# Patient Record
Sex: MALE | Race: WHITE | Employment: FULL TIME | URBAN - METROPOLITAN AREA
[De-identification: names, ages, dates, MRNs, and addresses within clinical notes are randomized per-mention and may not be internally consistent; named-entity substitution may affect disease eponyms.]

---

## 2017-07-12 ENCOUNTER — HOSPITAL ENCOUNTER (OUTPATIENT)
Facility: CLINIC | Age: 57
Discharge: HOME | End: 2017-07-12
Payer: COMMERCIAL

## 2017-07-12 ENCOUNTER — HOSPITAL ENCOUNTER (OUTPATIENT)
Dept: GENERAL RADIOLOGY | Facility: CLINIC | Age: 57
Discharge: HOME | End: 2017-07-12
Payer: COMMERCIAL

## 2017-07-12 DIAGNOSIS — R06.02 SHORTNESS OF BREATH: ICD-10-CM

## 2017-07-12 PROCEDURE — 85025 COMPLETE CBC W/AUTO DIFF WBC: CPT

## 2017-07-12 PROCEDURE — 83880 ASSAY OF NATRIURETIC PEPTIDE: CPT

## 2017-07-12 PROCEDURE — 80053 COMPREHEN METABOLIC PANEL: CPT

## 2017-07-12 PROCEDURE — 36415 COLL VENOUS BLD VENIPUNCTURE: CPT

## 2017-07-12 PROCEDURE — 71020 XR CHEST STANDARD TWO VW: CPT

## 2017-07-12 PROCEDURE — 84443 ASSAY THYROID STIM HORMONE: CPT

## 2017-07-13 LAB
ABSOLUTE EOS #: 0.2 K/UL (ref 0–0.4)
ABSOLUTE LYMPH #: 1.4 K/UL (ref 1–4.8)
ABSOLUTE MONO #: 0.5 K/UL (ref 0.1–1.2)
ALBUMIN SERPL-MCNC: 4.3 G/DL (ref 3.5–5.2)
ALBUMIN/GLOBULIN RATIO: 1.8 (ref 1–2.5)
ALP BLD-CCNC: 63 U/L (ref 40–129)
ALT SERPL-CCNC: 36 U/L (ref 5–41)
ANION GAP SERPL CALCULATED.3IONS-SCNC: 8 MMOL/L (ref 9–17)
AST SERPL-CCNC: 21 U/L
BASOPHILS # BLD: 1 %
BASOPHILS ABSOLUTE: 0 K/UL (ref 0–0.2)
BILIRUB SERPL-MCNC: 1.17 MG/DL (ref 0.3–1.2)
BNP INTERPRETATION: ABNORMAL
BUN BLDV-MCNC: 29 MG/DL (ref 6–20)
BUN/CREAT BLD: ABNORMAL (ref 9–20)
CALCIUM SERPL-MCNC: 9.4 MG/DL (ref 8.6–10.4)
CHLORIDE BLD-SCNC: 104 MMOL/L (ref 98–107)
CO2: 31 MMOL/L (ref 20–31)
CREAT SERPL-MCNC: 0.84 MG/DL (ref 0.7–1.2)
DIFFERENTIAL TYPE: NORMAL
EOSINOPHILS RELATIVE PERCENT: 3 %
GFR AFRICAN AMERICAN: >60 ML/MIN
GFR NON-AFRICAN AMERICAN: >60 ML/MIN
GFR SERPL CREATININE-BSD FRML MDRD: ABNORMAL ML/MIN/{1.73_M2}
GFR SERPL CREATININE-BSD FRML MDRD: ABNORMAL ML/MIN/{1.73_M2}
GLUCOSE BLD-MCNC: 101 MG/DL (ref 70–99)
HCT VFR BLD CALC: 44 % (ref 41–53)
HEMOGLOBIN: 14.7 G/DL (ref 13.5–17.5)
LYMPHOCYTES # BLD: 22 %
MCH RBC QN AUTO: 29 PG (ref 26–34)
MCHC RBC AUTO-ENTMCNC: 33.4 G/DL (ref 31–37)
MCV RBC AUTO: 86.7 FL (ref 80–100)
MONOCYTES # BLD: 7 %
PDW BLD-RTO: 14.8 % (ref 12.5–15.4)
PLATELET # BLD: 159 K/UL (ref 140–450)
PLATELET ESTIMATE: NORMAL
PMV BLD AUTO: 10.6 FL (ref 6–12)
POTASSIUM SERPL-SCNC: 4.1 MMOL/L (ref 3.7–5.3)
PRO-BNP: 503 PG/ML
RBC # BLD: 5.08 M/UL (ref 4.5–5.9)
RBC # BLD: NORMAL 10*6/UL
SEG NEUTROPHILS: 67 %
SEGMENTED NEUTROPHILS ABSOLUTE COUNT: 4.2 K/UL (ref 1.8–7.7)
SODIUM BLD-SCNC: 143 MMOL/L (ref 135–144)
TOTAL PROTEIN: 6.7 G/DL (ref 6.4–8.3)
TSH SERPL DL<=0.05 MIU/L-ACNC: 1.78 MIU/L (ref 0.3–5)
WBC # BLD: 6.4 K/UL (ref 3.5–11)
WBC # BLD: NORMAL 10*3/UL

## 2017-10-17 ENCOUNTER — APPOINTMENT (OUTPATIENT)
Dept: CT IMAGING | Age: 57
DRG: 216 | End: 2017-10-17
Payer: COMMERCIAL

## 2017-10-17 ENCOUNTER — APPOINTMENT (OUTPATIENT)
Dept: GENERAL RADIOLOGY | Age: 57
DRG: 216 | End: 2017-10-17
Payer: COMMERCIAL

## 2017-10-17 ENCOUNTER — HOSPITAL ENCOUNTER (INPATIENT)
Age: 57
LOS: 13 days | Discharge: HOME HEALTH | DRG: 216 | End: 2017-10-30
Attending: EMERGENCY MEDICINE | Admitting: FAMILY MEDICINE
Payer: COMMERCIAL

## 2017-10-17 DIAGNOSIS — I50.9 ACUTE CONGESTIVE HEART FAILURE, UNSPECIFIED CONGESTIVE HEART FAILURE TYPE: Primary | ICD-10-CM

## 2017-10-17 PROBLEM — I50.31 ACUTE DIASTOLIC CHF (CONGESTIVE HEART FAILURE) (HCC): Status: ACTIVE | Noted: 2017-10-17

## 2017-10-17 PROBLEM — I34.0 MODERATE MITRAL REGURGITATION: Status: ACTIVE | Noted: 2017-10-17

## 2017-10-17 PROBLEM — I34.0 MODERATE MITRAL REGURGITATION: Chronic | Status: ACTIVE | Noted: 2017-10-17

## 2017-10-17 PROBLEM — I48.0 PAROXYSMAL ATRIAL FIBRILLATION (HCC): Chronic | Status: ACTIVE | Noted: 2017-10-17

## 2017-10-17 PROBLEM — I48.0 PAROXYSMAL ATRIAL FIBRILLATION (HCC): Status: ACTIVE | Noted: 2017-10-17

## 2017-10-17 PROBLEM — R94.31 PROLONGED QT INTERVAL: Status: ACTIVE | Noted: 2017-10-17

## 2017-10-17 LAB
ABSOLUTE EOS #: 0.1 K/UL (ref 0–0.4)
ABSOLUTE LYMPH #: 1.3 K/UL (ref 1–4.8)
ABSOLUTE MONO #: 0.9 K/UL (ref 0.1–1.2)
ANION GAP SERPL CALCULATED.3IONS-SCNC: 11 MMOL/L (ref 9–17)
BASOPHILS # BLD: 0 %
BASOPHILS ABSOLUTE: 0 K/UL (ref 0–0.2)
BNP INTERPRETATION: ABNORMAL
BUN BLDV-MCNC: 35 MG/DL (ref 6–20)
BUN/CREAT BLD: ABNORMAL (ref 9–20)
CALCIUM SERPL-MCNC: 9.1 MG/DL (ref 8.6–10.4)
CHLORIDE BLD-SCNC: 103 MMOL/L (ref 98–107)
CHOLESTEROL/HDL RATIO: 5.2
CHOLESTEROL: 193 MG/DL
CO2: 26 MMOL/L (ref 20–31)
CREAT SERPL-MCNC: 1.13 MG/DL (ref 0.7–1.2)
D-DIMER QUANTITATIVE: 0.68 MG/L FEU
DIFFERENTIAL TYPE: ABNORMAL
EKG ATRIAL RATE: 130 BPM
EKG P-R INTERVAL: 152 MS
EKG Q-T INTERVAL: 390 MS
EKG QRS DURATION: 144 MS
EKG QTC CALCULATION (BAZETT): 573 MS
EKG R AXIS: 96 DEGREES
EKG T AXIS: 88 DEGREES
EKG VENTRICULAR RATE: 130 BPM
EOSINOPHILS RELATIVE PERCENT: 1 %
GFR AFRICAN AMERICAN: >60 ML/MIN
GFR NON-AFRICAN AMERICAN: >60 ML/MIN
GFR SERPL CREATININE-BSD FRML MDRD: ABNORMAL ML/MIN/{1.73_M2}
GFR SERPL CREATININE-BSD FRML MDRD: ABNORMAL ML/MIN/{1.73_M2}
GLUCOSE BLD-MCNC: 122 MG/DL (ref 70–99)
HCT VFR BLD CALC: 44 % (ref 41–53)
HCT VFR BLD CALC: 49.2 % (ref 41–53)
HDLC SERPL-MCNC: 37 MG/DL
HEMOGLOBIN: 14.9 G/DL (ref 13.5–17.5)
HEMOGLOBIN: 16.3 G/DL (ref 13.5–17.5)
LDL CHOLESTEROL: 130 MG/DL (ref 0–130)
LYMPHOCYTES # BLD: 10 %
MCH RBC QN AUTO: 28.1 PG (ref 26–34)
MCH RBC QN AUTO: 28.2 PG (ref 26–34)
MCHC RBC AUTO-ENTMCNC: 33.1 G/DL (ref 31–37)
MCHC RBC AUTO-ENTMCNC: 33.8 G/DL (ref 31–37)
MCV RBC AUTO: 83.5 FL (ref 80–100)
MCV RBC AUTO: 84.8 FL (ref 80–100)
MONOCYTES # BLD: 7 %
PARTIAL THROMBOPLASTIN TIME: 24.5 SEC (ref 21.3–31.3)
PDW BLD-RTO: 14.2 % (ref 12.5–15.4)
PDW BLD-RTO: 14.3 % (ref 12.5–15.4)
PLATELET # BLD: 154 K/UL (ref 140–450)
PLATELET # BLD: 179 K/UL (ref 140–450)
PLATELET ESTIMATE: ABNORMAL
PMV BLD AUTO: 10.4 FL (ref 6–12)
PMV BLD AUTO: 11.1 FL (ref 6–12)
POC TROPONIN I: 0 NG/ML (ref 0–0.1)
POC TROPONIN I: 0 NG/ML (ref 0–0.1)
POC TROPONIN INTERP: NORMAL
POC TROPONIN INTERP: NORMAL
POTASSIUM SERPL-SCNC: 4.1 MMOL/L (ref 3.7–5.3)
PRO-BNP: 5286 PG/ML
RBC # BLD: 5.27 M/UL (ref 4.5–5.9)
RBC # BLD: 5.8 M/UL (ref 4.5–5.9)
RBC # BLD: ABNORMAL 10*6/UL
SEG NEUTROPHILS: 82 %
SEGMENTED NEUTROPHILS ABSOLUTE COUNT: 10.5 K/UL (ref 1.8–7.7)
SODIUM BLD-SCNC: 140 MMOL/L (ref 135–144)
TRIGL SERPL-MCNC: 131 MG/DL
TSH SERPL DL<=0.05 MIU/L-ACNC: 2 MIU/L (ref 0.3–5)
VLDLC SERPL CALC-MCNC: ABNORMAL MG/DL (ref 1–30)
WBC # BLD: 10.1 K/UL (ref 3.5–11)
WBC # BLD: 12.9 K/UL (ref 3.5–11)
WBC # BLD: ABNORMAL 10*3/UL

## 2017-10-17 PROCEDURE — 80048 BASIC METABOLIC PNL TOTAL CA: CPT

## 2017-10-17 PROCEDURE — 6360000002 HC RX W HCPCS: Performed by: STUDENT IN AN ORGANIZED HEALTH CARE EDUCATION/TRAINING PROGRAM

## 2017-10-17 PROCEDURE — 6370000000 HC RX 637 (ALT 250 FOR IP): Performed by: INTERNAL MEDICINE

## 2017-10-17 PROCEDURE — 84443 ASSAY THYROID STIM HORMONE: CPT

## 2017-10-17 PROCEDURE — 2500000003 HC RX 250 WO HCPCS: Performed by: STUDENT IN AN ORGANIZED HEALTH CARE EDUCATION/TRAINING PROGRAM

## 2017-10-17 PROCEDURE — 93005 ELECTROCARDIOGRAM TRACING: CPT

## 2017-10-17 PROCEDURE — 85025 COMPLETE CBC W/AUTO DIFF WBC: CPT

## 2017-10-17 PROCEDURE — 99285 EMERGENCY DEPT VISIT HI MDM: CPT

## 2017-10-17 PROCEDURE — 2060000000 HC ICU INTERMEDIATE R&B

## 2017-10-17 PROCEDURE — 85379 FIBRIN DEGRADATION QUANT: CPT

## 2017-10-17 PROCEDURE — 6360000002 HC RX W HCPCS: Performed by: INTERNAL MEDICINE

## 2017-10-17 PROCEDURE — 71020 XR CHEST STANDARD TWO VW: CPT

## 2017-10-17 PROCEDURE — 36415 COLL VENOUS BLD VENIPUNCTURE: CPT

## 2017-10-17 PROCEDURE — 6360000004 HC RX CONTRAST MEDICATION: Performed by: STUDENT IN AN ORGANIZED HEALTH CARE EDUCATION/TRAINING PROGRAM

## 2017-10-17 PROCEDURE — 85730 THROMBOPLASTIN TIME PARTIAL: CPT

## 2017-10-17 PROCEDURE — 6370000000 HC RX 637 (ALT 250 FOR IP): Performed by: FAMILY MEDICINE

## 2017-10-17 PROCEDURE — 99223 1ST HOSP IP/OBS HIGH 75: CPT | Performed by: FAMILY MEDICINE

## 2017-10-17 PROCEDURE — 85027 COMPLETE CBC AUTOMATED: CPT

## 2017-10-17 PROCEDURE — 71260 CT THORAX DX C+: CPT

## 2017-10-17 PROCEDURE — 84484 ASSAY OF TROPONIN QUANT: CPT

## 2017-10-17 PROCEDURE — 80061 LIPID PANEL: CPT

## 2017-10-17 PROCEDURE — 83880 ASSAY OF NATRIURETIC PEPTIDE: CPT

## 2017-10-17 PROCEDURE — 6360000002 HC RX W HCPCS

## 2017-10-17 PROCEDURE — 96374 THER/PROPH/DIAG INJ IV PUSH: CPT

## 2017-10-17 PROCEDURE — 2580000003 HC RX 258: Performed by: FAMILY MEDICINE

## 2017-10-17 PROCEDURE — 6360000002 HC RX W HCPCS: Performed by: FAMILY MEDICINE

## 2017-10-17 RX ORDER — HEPARIN SODIUM 1000 [USP'U]/ML
4000 INJECTION, SOLUTION INTRAVENOUS; SUBCUTANEOUS ONCE
Status: COMPLETED | OUTPATIENT
Start: 2017-10-17 | End: 2017-10-17

## 2017-10-17 RX ORDER — SODIUM CHLORIDE 0.9 % (FLUSH) 0.9 %
10 SYRINGE (ML) INJECTION EVERY 12 HOURS SCHEDULED
Status: DISCONTINUED | OUTPATIENT
Start: 2017-10-17 | End: 2017-10-23

## 2017-10-17 RX ORDER — ATENOLOL 25 MG/1
25 TABLET ORAL 2 TIMES DAILY
Status: ON HOLD | COMMUNITY
End: 2017-10-30 | Stop reason: HOSPADM

## 2017-10-17 RX ORDER — POTASSIUM CHLORIDE 20 MEQ/1
40 TABLET, EXTENDED RELEASE ORAL PRN
Status: DISCONTINUED | OUTPATIENT
Start: 2017-10-17 | End: 2017-10-23

## 2017-10-17 RX ORDER — CITALOPRAM 10 MG/1
5 TABLET ORAL DAILY
Status: DISCONTINUED | OUTPATIENT
Start: 2017-10-17 | End: 2017-10-21

## 2017-10-17 RX ORDER — ASPIRIN 81 MG/1
81 TABLET, CHEWABLE ORAL DAILY
Status: DISCONTINUED | OUTPATIENT
Start: 2017-10-17 | End: 2017-10-23

## 2017-10-17 RX ORDER — SODIUM CHLORIDE 0.9 % (FLUSH) 0.9 %
10 SYRINGE (ML) INJECTION PRN
Status: DISCONTINUED | OUTPATIENT
Start: 2017-10-17 | End: 2017-10-23

## 2017-10-17 RX ORDER — HEPARIN SODIUM 1000 [USP'U]/ML
4000 INJECTION, SOLUTION INTRAVENOUS; SUBCUTANEOUS PRN
Status: DISCONTINUED | OUTPATIENT
Start: 2017-10-17 | End: 2017-10-23

## 2017-10-17 RX ORDER — NITROGLYCERIN 0.4 MG/1
0.4 TABLET SUBLINGUAL EVERY 5 MIN PRN
Status: DISCONTINUED | OUTPATIENT
Start: 2017-10-17 | End: 2017-10-23

## 2017-10-17 RX ORDER — ATENOLOL 25 MG/1
25 TABLET ORAL 2 TIMES DAILY
Status: DISCONTINUED | OUTPATIENT
Start: 2017-10-17 | End: 2017-10-17

## 2017-10-17 RX ORDER — HEPARIN SODIUM 10000 [USP'U]/100ML
12 INJECTION, SOLUTION INTRAVENOUS CONTINUOUS
Status: DISCONTINUED | OUTPATIENT
Start: 2017-10-17 | End: 2017-10-17

## 2017-10-17 RX ORDER — ONDANSETRON 2 MG/ML
4 INJECTION INTRAMUSCULAR; INTRAVENOUS EVERY 6 HOURS PRN
Status: DISCONTINUED | OUTPATIENT
Start: 2017-10-17 | End: 2017-10-23

## 2017-10-17 RX ORDER — LISINOPRIL 5 MG/1
5 TABLET ORAL DAILY
Status: DISCONTINUED | OUTPATIENT
Start: 2017-10-17 | End: 2017-10-17

## 2017-10-17 RX ORDER — SOTALOL HYDROCHLORIDE 80 MG/1
40 TABLET ORAL 2 TIMES DAILY
Status: DISCONTINUED | OUTPATIENT
Start: 2017-10-17 | End: 2017-10-17

## 2017-10-17 RX ORDER — METOPROLOL TARTRATE 50 MG/1
50 TABLET, FILM COATED ORAL 2 TIMES DAILY
Status: DISCONTINUED | OUTPATIENT
Start: 2017-10-17 | End: 2017-10-23

## 2017-10-17 RX ORDER — METOPROLOL TARTRATE 5 MG/5ML
INJECTION INTRAVENOUS
Status: DISPENSED
Start: 2017-10-17 | End: 2017-10-17

## 2017-10-17 RX ORDER — FUROSEMIDE 10 MG/ML
40 INJECTION INTRAMUSCULAR; INTRAVENOUS ONCE
Status: COMPLETED | OUTPATIENT
Start: 2017-10-17 | End: 2017-10-17

## 2017-10-17 RX ORDER — FUROSEMIDE 10 MG/ML
40 INJECTION INTRAMUSCULAR; INTRAVENOUS 2 TIMES DAILY
Status: DISCONTINUED | OUTPATIENT
Start: 2017-10-17 | End: 2017-10-18

## 2017-10-17 RX ORDER — HEPARIN SODIUM 1000 [USP'U]/ML
2000 INJECTION, SOLUTION INTRAVENOUS; SUBCUTANEOUS PRN
Status: DISCONTINUED | OUTPATIENT
Start: 2017-10-17 | End: 2017-10-23

## 2017-10-17 RX ORDER — HEPARIN SODIUM 1000 [USP'U]/ML
60 INJECTION, SOLUTION INTRAVENOUS; SUBCUTANEOUS ONCE
Status: DISCONTINUED | OUTPATIENT
Start: 2017-10-17 | End: 2017-10-17

## 2017-10-17 RX ORDER — POTASSIUM CHLORIDE 7.45 MG/ML
10 INJECTION INTRAVENOUS PRN
Status: DISCONTINUED | OUTPATIENT
Start: 2017-10-17 | End: 2017-10-23

## 2017-10-17 RX ORDER — HEPARIN SODIUM 1000 [USP'U]/ML
60 INJECTION, SOLUTION INTRAVENOUS; SUBCUTANEOUS PRN
Status: DISCONTINUED | OUTPATIENT
Start: 2017-10-17 | End: 2017-10-17

## 2017-10-17 RX ORDER — METOPROLOL TARTRATE 5 MG/5ML
5 INJECTION INTRAVENOUS ONCE
Status: COMPLETED | OUTPATIENT
Start: 2017-10-17 | End: 2017-10-17

## 2017-10-17 RX ORDER — ACETAMINOPHEN 325 MG/1
650 TABLET ORAL EVERY 4 HOURS PRN
Status: DISCONTINUED | OUTPATIENT
Start: 2017-10-17 | End: 2017-10-23

## 2017-10-17 RX ORDER — HEPARIN SODIUM 1000 [USP'U]/ML
30 INJECTION, SOLUTION INTRAVENOUS; SUBCUTANEOUS PRN
Status: DISCONTINUED | OUTPATIENT
Start: 2017-10-17 | End: 2017-10-17

## 2017-10-17 RX ORDER — HEPARIN SODIUM 10000 [USP'U]/100ML
1000 INJECTION, SOLUTION INTRAVENOUS CONTINUOUS
Status: DISCONTINUED | OUTPATIENT
Start: 2017-10-17 | End: 2017-10-23

## 2017-10-17 RX ORDER — POTASSIUM CHLORIDE 20MEQ/15ML
40 LIQUID (ML) ORAL PRN
Status: DISCONTINUED | OUTPATIENT
Start: 2017-10-17 | End: 2017-10-23

## 2017-10-17 RX ORDER — MAGNESIUM SULFATE 1 G/100ML
1 INJECTION INTRAVENOUS PRN
Status: DISCONTINUED | OUTPATIENT
Start: 2017-10-17 | End: 2017-10-23

## 2017-10-17 RX ADMIN — ASPIRIN 81 MG 81 MG: 81 TABLET ORAL at 14:52

## 2017-10-17 RX ADMIN — IOPAMIDOL 75 ML: 755 INJECTION, SOLUTION INTRAVENOUS at 06:43

## 2017-10-17 RX ADMIN — METOPROLOL TARTRATE 5 MG: 5 INJECTION INTRAVENOUS at 11:25

## 2017-10-17 RX ADMIN — METOPROLOL TARTRATE 50 MG: 50 TABLET, FILM COATED ORAL at 14:59

## 2017-10-17 RX ADMIN — HEPARIN SODIUM AND DEXTROSE 1000 UNITS/HR: 10000; 5 INJECTION INTRAVENOUS at 16:30

## 2017-10-17 RX ADMIN — HEPARIN SODIUM AND DEXTROSE 12 UNITS/KG/HR: 10000; 5 INJECTION INTRAVENOUS at 21:27

## 2017-10-17 RX ADMIN — METOPROLOL TARTRATE 50 MG: 50 TABLET, FILM COATED ORAL at 21:11

## 2017-10-17 RX ADMIN — FUROSEMIDE 40 MG: 10 INJECTION, SOLUTION INTRAMUSCULAR; INTRAVENOUS at 07:04

## 2017-10-17 RX ADMIN — CITALOPRAM 5 MG: 10 TABLET, FILM COATED ORAL at 14:53

## 2017-10-17 RX ADMIN — FUROSEMIDE 40 MG: 10 INJECTION, SOLUTION INTRAMUSCULAR; INTRAVENOUS at 21:12

## 2017-10-17 RX ADMIN — HEPARIN SODIUM 4000 UNITS: 1000 INJECTION, SOLUTION INTRAVENOUS; SUBCUTANEOUS at 16:30

## 2017-10-17 RX ADMIN — Medication 10 ML: at 21:12

## 2017-10-17 ASSESSMENT — ENCOUNTER SYMPTOMS
VOICE CHANGE: 0
WHEEZING: 0
COLOR CHANGE: 0
SINUS PRESSURE: 0
COUGH: 1
STRIDOR: 0
DIARRHEA: 0
COUGH: 0
ABDOMINAL PAIN: 0
CONSTIPATION: 0
BLOOD IN STOOL: 0
SORE THROAT: 0
NAUSEA: 0
VOMITING: 0
BACK PAIN: 0
SHORTNESS OF BREATH: 1

## 2017-10-17 NOTE — CARE COORDINATION
Case Management Initial Discharge Plan  Jose Choi,         Readmission Risk              Readmission Risk:        6.5       Age 71 or Greater:  0    Admitted from SNF or Requires Paid or Family Care:  0    Currently has CHF,COPD,ARF,CRI,or is on dialysis:  4    Takes more than 5 Prescription Medications:  0    Takes Digoxin,Insulin,Anticoagulants,Narcotics or ASA/Plavix:  [address] in Past 12 Months:  0    On Disability:  0    Patient Considers own Health:  2.5            Met with:patient to discuss discharge plans. Information verified: address, contacts, phone number, , insurance Yes  PCP: Maurisio Styles MD  Date of last visit: 2017    Insurance Provider: medical [place]    Discharge Planning  Current Residence:  Private Residence  Living Arrangements:  Spouse/Significant Other   Home has 2 stories/1 flight stairs to climb  Support Systems:  Spouse/Significant Other  Current Services PTA:  None Supplier: na  Patient able to perform ADL's:Independent  DME used to aid ambulation prior to admission: none/during admission none    Potential Assistance Needed:  N/A    Pharmacy: [facility] on [address]   Potential Assistance Purchasing Medications:  No  Does patient want to participate in local refill/ meds to beds program?  No    Patient agreeable to home care: No  [place] of choice provided:  n/a      Type of Home Care Services:  None  Patient expects to be discharged to:  Home    Prior SNF/Rehab Placement and Facility: no  Agreeable to SNF/Rehab: No  [place] of choice provided: n/a   Evaluation: n/a    Expected Discharge date: Follow Up Appointment: Best Day/ Time:      Transportation provider: wife  Transportation arrangements needed for discharge: No    Discharge Plan: Pt states he is independent at home and denies the need for in home skilled services at this time.  Plan is home with family support        Electronically signed by Ajay Garibay RN on 10/17/17 at 7:55 AM

## 2017-10-17 NOTE — PLAN OF CARE
Problem: Safety:  Goal: Free from accidental physical injury  Free from accidental physical injury  Outcome: Ongoing

## 2017-10-17 NOTE — ED PROVIDER NOTES
[facility]  ED  Emergency Department Encounter  Emergency Medicine Resident     Pt Name: Cydney Cranker  MRN: [id number]  Alon[place]ard 1960  Date of evaluation: 10/17/17  PCP:  Shauna Christina MD    CHIEF COMPLAINT       Chief Complaint   Patient presents with    Shortness of Breath     worsening since wednesday, unlabored breathing, NAD, cardiac hx       HISTORY OF PRESENT ILLNESS  (Location/Symptom, Timing/Onset, Context/Setting, Quality, Duration, Modifying Factors, Severity.)      Cydney Cranker is a 61 y.o. male who presents with Progressively worsening shortness of breath over the past week. Patient reports he has been self adjusting his medications due to feeling his heart was beating fast.  Shortness breath is worsened with exertion, patient reports walking a few steps will worsen it. Patient has a history of mitral valve replacement approximately 10 years ago. Recent stress and echo in July 2017 was normal per patient. Patient reports he takes atenolol and sotalol for A. fib, and is on aspirin. Patient reports he does not take any other anticoagulants. Patient denies fevers, chills, chest pain. Does report orthopnea since this began Wednesday. No sick contacts. Reports Dr. Henrry Wolf is his cardiologist and he last had a visit approximately one month ago. PAST MEDICAL / SURGICAL / SOCIAL / FAMILY HISTORY      has no past medical history on file. has a past surgical history that includes Mitral valve replacement (2007). Social History     Social History    Marital status:      Spouse name: N/A    Number of children: N/A    Years of education: N/A     Occupational History    Not on file.      Social History Main Topics    Smoking status: Never Smoker    Smokeless tobacco: Never Used    Alcohol use No    Drug use: No    Sexual activity: Not on file     Other Topics Concern    Not on file     Social History Narrative    No narrative on file       History reviewed. No pertinent family history. Allergies:  Review of patient's allergies indicates no known allergies. Home Medications:  Prior to Admission medications    Medication Sig Start Date End Date Taking? Authorizing Provider   atenolol (TENORMIN) 25 MG tablet Take 25 mg by mouth 2 times daily   Yes Historical Provider, MD   sotalol (BETAPACE) 40 MG split tablet Take 40 mg by mouth 2 times daily   Yes Historical Provider, MD   aspirin 81 MG tablet Take 81 mg by mouth daily   Yes Historical Provider, MD   Citalopram Hydrobromide (CELEXA PO) Take 5 mg by mouth   Yes Historical Provider, MD       REVIEW OF SYSTEMS    (2-9 systems for level 4, 10 or more for level 5)      Review of Systems   Constitutional: Negative for fatigue and fever. Respiratory: Positive for cough and shortness of breath. Negative for wheezing and stridor. Cardiovascular: Positive for palpitations and leg swelling. Negative for chest pain. Gastrointestinal: Negative for diarrhea, nausea and vomiting. Genitourinary: Negative for decreased urine volume and urgency. Musculoskeletal: Negative for back pain and neck pain. Skin: Negative for color change and pallor. Neurological: Negative for weakness, light-headedness and headaches. Hematological: Does not bruise/bleed easily. PHYSICAL EXAM   (up to 7 for level 4, 8 or more for level 5)      INITIAL VITALS:   BP (!) 139/104   Pulse 118   Temp 97.2 °F (36.2 °C)   Resp 15   Ht 5' 9\" (1.753 m)   Wt 110 lb (49.9 kg)   SpO2 96%   BMI 16.24 kg/m²     Physical Exam   Constitutional: He is oriented to person, place, and time. He appears well-nourished. No distress. HENT:   Head: Normocephalic. Cardiovascular: Exam reveals no gallop. Tachycardic   Pulmonary/Chest: No respiratory distress. He has rales. He exhibits no tenderness. Regular respiratory rate, fair air movement, bibasilar rales   Abdominal: Soft. He exhibits no distension. There is no tenderness. Natriuretic Peptide   Result Value Ref Range    Pro-BNP 5,286 (H) <300 pg/mL    BNP Interpretation         CBC Auto Differential   Result Value Ref Range    WBC 12.9 (H) 3.5 - 11.0 k/uL    RBC 5.80 4.5 - 5.9 m/uL    Hemoglobin 16.3 13.5 - 17.5 g/dL    Hematocrit 49.2 41 - 53 %    MCV 84.8 80 - 100 fL    MCH 28.1 26 - 34 pg    MCHC 33.1 31 - 37 g/dL    RDW 14.2 12.5 - 15.4 %    Platelets 628 552 - 997 k/uL    MPV 11.1 6.0 - 12.0 fL    Differential Type NOT REPORTED     Seg Neutrophils 82 %    Lymphocytes 10 %    Monocytes 7 %    Eosinophils % 1 %    Basophils 0 %    Segs Absolute 10.50 (H) 1.8 - 7.7 k/uL    Absolute Lymph # 1.30 1.0 - 4.8 k/uL    Absolute Mono # 0.90 0.1 - 1.2 k/uL    Absolute Eos # 0.10 0.0 - 0.4 k/uL    Basophils # 0.00 0.0 - 0.2 k/uL    WBC Morphology NOT REPORTED     RBC Morphology NOT REPORTED     Platelet Estimate NOT REPORTED    D-Dimer, Quantitative   Result Value Ref Range    D-Dimer, Quant 0.68 mg/L FEU   POCT troponin   Result Value Ref Range    POC Troponin I 0.00 0.00 - 0.10 ng/mL    POC Troponin Interp       The Troponin-I (POC) results cannot be compared to the Troponin-T results. POCT troponin   Result Value Ref Range    POC Troponin I 0.00 0.00 - 0.10 ng/mL    POC Troponin Interp       The Troponin-I (POC) results cannot be compared to the Troponin-T results. RADIOLOGY:  No results found. EKG    Normal sinus rhythm, tachycardic (116), regular intervals, no ST elevation/depression, no t wave changes    Impression: non-specific EKG    All EKG's are interpreted by the Emergency Department Physician who either signs or Co-signs this chart in the absence of a cardiologist.    [facility]/EMERGENCY DEPARTMENT COURSE:  1126 AM: 54-year-old male with history of mitral valve replacement and A. fib presenting with shortness of breath ×5 days. Patient is nontoxic-appearing, tachycardic and has bibasilar rales on exam.  Cardiac workup, including d-dimer and BNP. Reassess.     5:40: D-dimer

## 2017-10-17 NOTE — H&P
Cholesterol 193 <200 mg/dL    HDL 37 (L) >40 mg/dL    LDL Cholesterol 130 0 - 130 mg/dL    Chol/HDL Ratio 5.2 (H) <5    Triglycerides 131 <150 mg/dL    VLDL NOT REPORTED 1 - 30 mg/dL   TSH without Reflex    Collection Time: 10/17/17  5:25 AM   Result Value Ref Range    TSH 2.00 0.30 - 5.00 mIU/L   POCT troponin    Collection Time: 10/17/17  7:05 AM   Result Value Ref Range    POC Troponin I 0.00 0.00 - 0.10 ng/mL    POC Troponin Interp       The Troponin-I (POC) results cannot be compared to the Troponin-T results. EKG 12 Lead    Collection Time: 10/17/17 11:12 AM   Result Value Ref Range    Ventricular Rate 130 BPM    Atrial Rate 130 BPM    P-R Interval 152 ms    QRS Duration 144 ms    Q-T Interval 390 ms    QTc Calculation (Bazett) 573 ms    R Axis 96 degrees    T Axis 88 degrees       Imaging/Diagonstics:    CT chest pulmonary embolism 10/17/17 - no central or proximal segmental pulmonary embolus, mild to moderate CHF with small pleural effusion and pulmonary edema, 5 mm  lower lobe nodule. EKG 10/17/17- sinus tachycardia  Assessment :      Primary Problem  Acute diastolic CHF (congestive heart failure) [facility])    Active Hospital Problems    Diagnosis Date Noted    Acute diastolic CHF (congestive heart failure) (HCC) [I50.31] 10/17/2017    Moderate mitral regurgitation [I34.0] 10/17/2017    Paroxysmal atrial fibrillation ([address] [I48.0] 10/17/2017    Prolonged QT interval [R94.31] 10/17/2017       Plan:     Patient status Admit as inpatient in the  Progressive Unit/Step down    1. Acute diastolic CHF-IV Lasix, will increase atenolol. Cardiology consultation. 2. Sinus tachycardia worsening CHF- rate control. 3. Moderate mitral regurgitation-repeat echocardiogram  4. Proximal atrial fibrillation- continue aspirin, BB . Sotalol.    5. QTc prolongation -     Consultations:   IP CONSULT TO HOSPITALIST  IP CONSULT TO CARDIOLOGY    Patient is admitted as inpatient status because of co-morbidities listed above, severity of signs and symptoms as outlined, requirement for current medical therapies and most importantly because of direct risk to patient if care not provided in a hospital setting. Discussed with staff Jessi Juarez RN.   BiPAP at bedside Updated    Justo Kwan MD  10/17/2017    Copy sent to Dr. Helen Patton MD

## 2017-10-17 NOTE — ED NOTES
Report taken from [facility]. Pt is A&O x 4. NAD noted at this time. Awaiting POC. 2nd TROP running.       Anuj Street RN  10/17/17 2937

## 2017-10-18 ENCOUNTER — APPOINTMENT (OUTPATIENT)
Dept: CARDIAC CATH/INVASIVE PROCEDURES | Age: 57
DRG: 216 | End: 2017-10-18
Payer: COMMERCIAL

## 2017-10-18 LAB
ANION GAP SERPL CALCULATED.3IONS-SCNC: 13 MMOL/L (ref 9–17)
BUN BLDV-MCNC: 30 MG/DL (ref 6–20)
BUN/CREAT BLD: ABNORMAL (ref 9–20)
CALCIUM SERPL-MCNC: 9 MG/DL (ref 8.6–10.4)
CHLORIDE BLD-SCNC: 101 MMOL/L (ref 98–107)
CO2: 28 MMOL/L (ref 20–31)
CREAT SERPL-MCNC: 0.96 MG/DL (ref 0.7–1.2)
GFR AFRICAN AMERICAN: >60 ML/MIN
GFR NON-AFRICAN AMERICAN: >60 ML/MIN
GFR SERPL CREATININE-BSD FRML MDRD: ABNORMAL ML/MIN/{1.73_M2}
GFR SERPL CREATININE-BSD FRML MDRD: ABNORMAL ML/MIN/{1.73_M2}
GLUCOSE BLD-MCNC: 124 MG/DL (ref 70–99)
MAGNESIUM: 2.2 MG/DL (ref 1.6–2.6)
PARTIAL THROMBOPLASTIN TIME: 32 SEC (ref 21.3–31.3)
PARTIAL THROMBOPLASTIN TIME: 44.6 SEC (ref 21.3–31.3)
PARTIAL THROMBOPLASTIN TIME: 47.9 SEC (ref 21.3–31.3)
PARTIAL THROMBOPLASTIN TIME: 48.2 SEC (ref 21.3–31.3)
POTASSIUM SERPL-SCNC: 3.7 MMOL/L (ref 3.7–5.3)
POTASSIUM SERPL-SCNC: 4.3 MMOL/L (ref 3.7–5.3)
SODIUM BLD-SCNC: 142 MMOL/L (ref 135–144)

## 2017-10-18 PROCEDURE — 2500000003 HC RX 250 WO HCPCS: Performed by: INTERNAL MEDICINE

## 2017-10-18 PROCEDURE — 93005 ELECTROCARDIOGRAM TRACING: CPT

## 2017-10-18 PROCEDURE — 6370000000 HC RX 637 (ALT 250 FOR IP): Performed by: INTERNAL MEDICINE

## 2017-10-18 PROCEDURE — 2580000003 HC RX 258: Performed by: INTERNAL MEDICINE

## 2017-10-18 PROCEDURE — 36415 COLL VENOUS BLD VENIPUNCTURE: CPT

## 2017-10-18 PROCEDURE — 6370000000 HC RX 637 (ALT 250 FOR IP): Performed by: NURSE PRACTITIONER

## 2017-10-18 PROCEDURE — 2060000000 HC ICU INTERMEDIATE R&B

## 2017-10-18 PROCEDURE — 83735 ASSAY OF MAGNESIUM: CPT

## 2017-10-18 PROCEDURE — 2580000003 HC RX 258: Performed by: FAMILY MEDICINE

## 2017-10-18 PROCEDURE — 99232 SBSQ HOSP IP/OBS MODERATE 35: CPT | Performed by: FAMILY MEDICINE

## 2017-10-18 PROCEDURE — 85730 THROMBOPLASTIN TIME PARTIAL: CPT

## 2017-10-18 PROCEDURE — 6360000002 HC RX W HCPCS: Performed by: INTERNAL MEDICINE

## 2017-10-18 PROCEDURE — 80048 BASIC METABOLIC PNL TOTAL CA: CPT

## 2017-10-18 PROCEDURE — 93312 ECHO TRANSESOPHAGEAL: CPT

## 2017-10-18 PROCEDURE — 93325 DOPPLER ECHO COLOR FLOW MAPG: CPT

## 2017-10-18 PROCEDURE — 93320 DOPPLER ECHO COMPLETE: CPT

## 2017-10-18 PROCEDURE — 6370000000 HC RX 637 (ALT 250 FOR IP): Performed by: FAMILY MEDICINE

## 2017-10-18 PROCEDURE — B24BZZ4 ULTRASONOGRAPHY OF HEART WITH AORTA, TRANSESOPHAGEAL: ICD-10-PCS | Performed by: INTERNAL MEDICINE

## 2017-10-18 PROCEDURE — 84132 ASSAY OF SERUM POTASSIUM: CPT

## 2017-10-18 PROCEDURE — 99221 1ST HOSP IP/OBS SF/LOW 40: CPT | Performed by: PHYSICIAN ASSISTANT

## 2017-10-18 RX ORDER — FUROSEMIDE 40 MG/1
40 TABLET ORAL DAILY
Status: DISCONTINUED | OUTPATIENT
Start: 2017-10-18 | End: 2017-10-23

## 2017-10-18 RX ORDER — SODIUM CHLORIDE 9 MG/ML
INJECTION, SOLUTION INTRAVENOUS CONTINUOUS
Status: DISCONTINUED | OUTPATIENT
Start: 2017-10-18 | End: 2017-10-21

## 2017-10-18 RX ADMIN — HEPARIN SODIUM 2000 UNITS: 1000 INJECTION, SOLUTION INTRAVENOUS; SUBCUTANEOUS at 12:42

## 2017-10-18 RX ADMIN — METOPROLOL TARTRATE 12.5 MG: 25 TABLET ORAL at 03:04

## 2017-10-18 RX ADMIN — SODIUM CHLORIDE: 9 INJECTION, SOLUTION INTRAVENOUS at 09:10

## 2017-10-18 RX ADMIN — HEPARIN SODIUM 2000 UNITS: 1000 INJECTION, SOLUTION INTRAVENOUS; SUBCUTANEOUS at 18:11

## 2017-10-18 RX ADMIN — DILTIAZEM HYDROCHLORIDE 10 MG/HR: 5 INJECTION INTRAVENOUS at 05:19

## 2017-10-18 RX ADMIN — CITALOPRAM 5 MG: 10 TABLET, FILM COATED ORAL at 12:03

## 2017-10-18 RX ADMIN — FUROSEMIDE 40 MG: 40 TABLET ORAL at 12:44

## 2017-10-18 RX ADMIN — DILTIAZEM HYDROCHLORIDE 10 MG/HR: 5 INJECTION INTRAVENOUS at 17:15

## 2017-10-18 RX ADMIN — METOPROLOL TARTRATE 50 MG: 50 TABLET, FILM COATED ORAL at 12:03

## 2017-10-18 RX ADMIN — Medication 10 ML: at 20:04

## 2017-10-18 RX ADMIN — POTASSIUM CHLORIDE 40 MEQ: 40 SOLUTION ORAL at 16:40

## 2017-10-18 RX ADMIN — ASPIRIN 81 MG 81 MG: 81 TABLET ORAL at 12:03

## 2017-10-18 RX ADMIN — METOPROLOL TARTRATE 50 MG: 50 TABLET, FILM COATED ORAL at 20:03

## 2017-10-18 RX ADMIN — HEPARIN SODIUM AND DEXTROSE 13.74 UNITS/KG/HR: 10000; 5 INJECTION INTRAVENOUS at 16:38

## 2017-10-18 ASSESSMENT — ENCOUNTER SYMPTOMS
COUGH: 0
CONSTIPATION: 0
BLOOD IN STOOL: 0
WHEEZING: 0
SORE THROAT: 0
DIARRHEA: 0
SHORTNESS OF BREATH: 1
VOICE CHANGE: 0
SINUS PRESSURE: 0
NAUSEA: 0
ABDOMINAL PAIN: 0
VOMITING: 0

## 2017-10-18 ASSESSMENT — PAIN SCALES - GENERAL
PAINLEVEL_OUTOF10: 0

## 2017-10-18 NOTE — PROGRESS NOTES
Pt. HR remaining in 120s-130s, one time dose of PO lopressor given per on-call NP, will continue to monitor

## 2017-10-18 NOTE — PLAN OF CARE
Problem: FLUID AND ELECTROLYTE IMBALANCE  Goal: Fluid and electrolyte balance are achieved/maintained  Outcome: Ongoing

## 2017-10-18 NOTE — CONSULTS
Used        History   Alcohol Use No        History   Drug Use No      Marital status:      Occupation:      Family History:    History reviewed. No pertinent family history. REVIEW OF SYSTEMS:  11 PROS negative except for what's mentioned in HPI and PMH    PHYSICAL EXAM:  General: no acute distress, alert, oriented x 3, appropriate mood and affect, looks stated age, well nourished  VITALS:  /70   Pulse 76   Temp 97.7 °F (36.5 °C) (Oral)   Resp 16   Ht 5' 9\" (1.753 m)   Wt 200 lb 9.6 oz (91 kg)   SpO2 95%   BMI 29.62 kg/m²   Eyes:  Pupils equal round reactive to light and accomodation. Extraocular movement intact. Anicteric. Head/ENT:  Atraumatic, normocephalic. Hearing grossly intact. Good dentition no abscess or obvious dental caries  Neck: Supple, nontender. Trachea midline. No thyromegaly. No bruit  Lymphatics: cervical no lymphadenopathy  Lungs: fine crackles right base  Cardiovascular: Normal S1, S2,  Faint murmur LLSB, Aflutter  Abdomen:  Soft, non-tender, normal bowel sounds. No bruits, organomegaly or masses. Musculoskeletal:  5/5 muscle strength throughout. Extremities: none edema  PV:  peripheral pulses 2+ and symmetric  Skin: No jaundice or eczema. Neurological: Normal sensation throughout. Moves all extremities to command  Psychiatric: Oriented to person place and time, no evidence of depression.     Data:  CBC:   Lab Results   Component Value Date    WBC 10.1 10/17/2017    RBC 5.27 10/17/2017    RBC 5.67 04/11/2012    HGB 14.9 10/17/2017    HCT 44.0 10/17/2017    MCV 83.5 10/17/2017    MCH 28.2 10/17/2017    MCHC 33.8 10/17/2017    RDW 14.3 10/17/2017     10/17/2017     04/11/2012    MPV 10.4 10/17/2017     BMP:    Lab Results   Component Value Date     10/18/2017    K 3.7 10/18/2017     10/18/2017    CO2 28 10/18/2017    BUN 30 10/18/2017    LABALBU 4.3 07/12/2017    LABALBU 4.4 04/11/2012    CREATININE 0.96 10/18/2017    CALCIUM 9.0 10/18/2017    GFRAA >60 10/18/2017    LABGLOM >60 10/18/2017    GLUCOSE 124 10/18/2017    GLUCOSE 96 04/11/2012     Last 3 Troponin:    Lab Results   Component Value Date    TROPONINI 0.00 10/17/2017    TROPONINI 0.00 10/17/2017     Cardiac Cath:  Not done yet    ALEENA, 10/18/2017: Bioprosthetic mitral valve is seen. Thickened leaflets, severe mitral regurgitation with multiple jests seen. High velocity across the valve with mean gradient of 15 mm Hg suggestive of dysfunctional bioprosthetic mitral valve. Mild aortic and tricuspid regurgitation. Reduced left ventricular systolic function. No PFO seen by color Doppler. Problem List:  Patient Active Problem List   Diagnosis    Acute diastolic CHF (congestive heart failure) (HCC)    Moderate mitral regurgitation    Paroxysmal atrial fibrillation (HCC)    Prolonged QT interval     Plan:  Discussed at length with patient and wife. Will proceed with right and left heart cath per Cardiology. Obtain all old records from Dr. Nazia Brantley and Bonnie Bailey    This patient was seen with  Ethel Justice PA-C and agree with documentation as scribed. I have seen and examined the patient, agree with the assessment and management. Pt may need repeat Maze and will discuss with Dr. Dilia Mari. Care plan has been discussed with staff.   Hector Davey MD

## 2017-10-18 NOTE — PROGRESS NOTES
Patient admitted from 3006, consent signed, all questions answered. Pt ready for procedure. Call light to reach with side rails up 2 of 2. Wife at bedside with patient.

## 2017-10-18 NOTE — PROGRESS NOTES
[facility]   Progress Note                   Date:   10/18/2017  Patient name: Nathalie Sanchez  Date of admission:  10/17/2017  4:54 AM  MRN:   [id number]  YOB: 1960  PCP: Linnea Vidales MD    Reason for Admission: CHF (congestive heart failure), NYHA class II, acute on chronic, systolic (HCC) [T36.36]    Subjective:       Clinical Changes / Abnormalities: Patient seen and examined. Resting in bed. Denies angina, shortness of breath at this time. States he has some dizziness. Patient states his heart rate occasionally goes into an irregular rate when above 105 bpm.     -2.1 L since admission. Medications:   Scheduled Meds:   aspirin  81 mg Oral Daily    citalopram  5 mg Oral Daily    sodium chloride flush  10 mL Intravenous 2 times per day    furosemide  40 mg Intravenous BID    metoprolol tartrate  50 mg Oral BID     Continuous Infusions:   diltiazem (CARDIZEM) 125 mg in dextrose 5% 125 mL infusion 10 mg/hr (10/18/17 0519)    sodium chloride 75 mL/hr at 10/18/17 0910    heparin (porcine) 12 Units/kg/hr (10/17/17 2127)     CBC:   Recent Labs      10/17/17   0525  10/17/17   1518   WBC  12.9*  10.1   HGB  16.3  14.9   PLT  179  154     BMP:    Recent Labs      10/17/17   0525  10/18/17   0653   NA  140  142   K  4.1  3.7   CL  103  101   CO2  26  28   BUN  35*  30*   CREATININE  1.13  0.96   GLUCOSE  122*  124*     Hepatic: No results for input(s): AST, ALT, ALB, BILITOT, ALKPHOS in the last 72 hours. Troponin:   Recent Labs      10/17/17   0513  10/17/17   0705   TROPONINI  0.00  0.00     BNP: No results for input(s): BNP in the last 72 hours. Lipids:   Recent Labs      10/17/17   0525   CHOL  193   HDL  37*     INR: No results for input(s): INR in the last 72 hours. STRESS 7/27/17: Small inferior infarct. EF 50%. WMA.      TTE 7/27/17: EF 50%. Segmental WMA. Bioprosthetic MV has evidence of stenosis. ALEENA on 10/18/17: The patient was referred for ALEENA.  Benefits, risks and alternatives explained and consent signed. Bioprosthetic mitral valve is seen. Thickened leaflets, severe mitral regurgitation with multiple jests seen. High velocity across the valve with mean gradient of 15 mm Hg suggestive of dysfunctional bioprosthetic mitral valve. Mild aortic and tricuspid regurgitation. Reduced left ventricular systolic function. No PFO seen by color Doppler. Condition discussed with family and patient, consider CT surgery evaluation. Will need RHC and zohreh nary angiography before any anticipated valve redo surgery. No complications    Objective:   Vitals: /82   Pulse (!) 48   Temp 97.4 °F (36.3 °C) (Oral)   Resp 16   Ht 5' 9\" (1.753 m)   Wt 200 lb 9.6 oz (91 kg)   SpO2 95%   BMI 29.62 kg/m²   General appearance: alert and cooperative with exam  HEENT: Head: Normocephalic, no lesions, without obvious abnormality. Neck: no JVD, trachea midline, no adenopathy  Lungs: Clear to auscultation  Heart: Irregular rate and rhythm, s1/s2 auscultated, +3/6 murmur left axillary region. Abdomen: soft, non-tender, bowel sounds active  Extremities: no edema  Neurologic: not done        Assessment / Acute Cardiac Problems:   1. Shortness of breath  2. AFib s/p ablation  3. PAF-heparin gtt. Patient Active Problem List:     Acute diastolic CHF (congestive heart failure) (HCC)     Moderate mitral regurgitation     Paroxysmal atrial fibrillation (HCC)     Prolonged QT interval      Plan of Treatment:   1. Severe MR-CT surgery consult. Would need right and left heart cath prior to valve redo surgery. 2. PAF-on heparin and cardizem gtt. EKG now. 3. HFpEF-on Lasix.  Change to PO    Electronically signed by Remington Alfaro CNP on 10/18/2017 at 11:44 AM  [address]  [phone]

## 2017-10-18 NOTE — PROGRESS NOTES
Daily Progress Note     Admit Date: 10/17/2017  Bed/Room No.  3006/3006-01  Admitting Physician : Carmen Cosme MD  Code Status :Full Code  Hospital Day:  LOS: 1 day   Complaint at Admission :   Chief Complaint   Patient presents with    Shortness of Breath     worsening since wednesday, unlabored breathing, NAD, cardiac hx     Principal Problem:    Acute diastolic CHF (congestive heart failure) ([facility]  Active Problems: Moderate mitral regurgitation    Paroxysmal atrial fibrillation (HCC)    Prolonged QT interval    Subjective: Interval History/Significant events :  10/18/17    Patient Reports improvement in breathing, heart rate is controlled. Patient having bigemini. He denies any chest pain, pedal edema, palpitations. Vitals - Stable afebrile  Labs - stable ,     Nursing notes , Consults notes reviewed. Overnight events and updates discussed with Nursing staff . Background history    Admitted for Acute diastolic CHF (congestive heart failure) ([facility] , in hospital for 1 days . Rosamaria Chase 61 y.o. male   is admitted to the hospital for the management of  Acute diastolic CHF, moderate mitral regurgitation. Patient came to emergency room for shortness of breath. He reported that he has been having difficulty in breathing since May 2017. Patient followed with his cardiologist in July and had stress test and echocardiogram.  He was found to have moderate mitral regurgitation. Patient has underlying history of mitral valve replacement in February 2017. He has history of paroxysmal atrial fibrillation and has been on sotalol and atenolol. Patient takes aspirin 81 mg daily. He reported cough, with clear sputum, orthopnea, PND. Patient denied any palpitations although he reported that her heart rate has been more than 110. Patient denies any fever, dizziness, lightheadedness. He denies any leg swelling. Initial evaluation in the emergency room showed heart rate 130s. Troponins negative.   CT 0317 114/83 97.7 °F (36.5 °C) Oral 132 19 - - -   10/17/17 2304 133/73 98 °F (36.7 °C) Oral 87 22 - - -   10/17/17 1937 137/74 97.5 °F (36.4 °C) Oral 89 20 96 % - -   10/17/17 1300 - - - - - - 5' 9\" (1.753 m) 200 lb 9.6 oz (91 kg)   10/17/17 1130 136/89 97.9 °F (36.6 °C) Oral 126 21 95 % - -   10/17/17 0950 - - - 115 17 96 % - -   10/17/17 0902 (!) 142/97 98.1 °F (36.7 °C) Oral 114 24 97 % - -   10/17/17 0832 (!) 148/117 - - 120 22 - - -   10/17/17 0748 (!) 137/93 - - 115 24 95 % - -     Intake / output   10/17 0701 - 10/18 0700  In: 400 [P.O.:400]  Out: 2300 [Urine:2300]  Physical Exam:  Physical Exam   Constitutional: He is oriented to person, place, and time. He appears well-developed and well-nourished. No distress. HENT:   Mouth/Throat: Oropharynx is clear and moist. No oropharyngeal exudate. Eyes: Pupils are equal, round, and reactive to light. No scleral icterus. Neck: Neck supple. No JVD present. No tracheal deviation present. No thyromegaly present. Cardiovascular: Normal rate, regular rhythm and intact distal pulses. Exam reveals no gallop and no friction rub. Murmur heard. Pulmonary/Chest: Effort normal. No respiratory distress. He has no wheezes. He has rales. He exhibits no tenderness. Abdominal: Soft. Bowel sounds are normal. He exhibits no mass. There is no tenderness. There is no rebound and no guarding. Lymphadenopathy:     He has no cervical adenopathy. Neurological: He is alert and oriented to person, place, and time. No cranial nerve deficit. He exhibits normal muscle tone. Skin: Skin is warm. No rash noted. He is not diaphoretic. Psychiatric: He has a normal mood and affect. His behavior is normal.   Nursing note and vitals reviewed.     Lower Extremities : No ankle Edema , No calf Tenderness     Laboratory findings:    Recent Labs      10/17/17   0525  10/17/17   1518   WBC  12.9*  10.1   HGB  16.3  14.9   HCT  49.2  44.0   PLT  179  154     Recent Labs      10/17/17 0525   NA  140   K  4.1   CL  103   CO2  26   GLUCOSE  122*   BUN  35*   CREATININE  1.13   CALCIUM  9.1     Recent Labs      10/17/17   0513  10/17/17   0525  10/17/17   0705   TSH   --   2.00   --    CHOL   --   193   --    HDL   --   37*   --    LDLCHOLESTEROL   --   130   --    CHOLHDLRATIO   --   5.2*   --    TRIG   --   131   --    VLDL   --   NOT REPORTED   --    TROPONINI  0.00   --   0.00      Imaging/Diagonstics:     CT chest pulmonary embolism 10/17/17 - no central or proximal segmental pulmonary embolus, mild to moderate CHF with small pleural effusion and pulmonary edema, 5 mm  lower lobe nodule.      EKG 10/17/17- sinus tachycardia  Clinical Course : gradually improving  Assessment and Plan      1. Acute diastolic CHF- Continue  IV Lasix. Rate control with Cardizem infusion . 2. PAF - in NSR with Bigeminy . heparin infusion, Cardizem infusion. ? Sotalol . 3. Moderate mitral regurgitation- status post bioprosthetic valve replacement Feb 2007. ALEENA shows dysfunctional mitral valve. Needs a redo surgery. , low intensity heparin. 4. QTc prolongation - Likely secondary to sotalol. Will defer to cardiology. Check magnesium      Continue to monitor vitals , Intake / output ,  Cell count , HGB , Kidney function, oxygenation  as indicated . Plan and updates discussed with patient ,  answers  explained to satisfaction.    Plan discussed with Staff carmelina NUNEZ     (Please note that portions of this note were completed with a voice recognition program. Efforts were made to edit the dictations but occasionally words are mis-transcribed.)      Aissatou Casanova MD  10/18/2017

## 2017-10-19 ENCOUNTER — APPOINTMENT (OUTPATIENT)
Dept: CARDIAC CATH/INVASIVE PROCEDURES | Age: 57
DRG: 216 | End: 2017-10-19
Payer: COMMERCIAL

## 2017-10-19 LAB
ANION GAP SERPL CALCULATED.3IONS-SCNC: 12 MMOL/L (ref 9–17)
BUN BLDV-MCNC: 26 MG/DL (ref 6–20)
BUN/CREAT BLD: ABNORMAL (ref 9–20)
CALCIUM SERPL-MCNC: 8.6 MG/DL (ref 8.6–10.4)
CHLORIDE BLD-SCNC: 100 MMOL/L (ref 98–107)
CO2: 29 MMOL/L (ref 20–31)
CREAT SERPL-MCNC: 0.91 MG/DL (ref 0.7–1.2)
EKG ATRIAL RATE: 276 BPM
EKG ATRIAL RATE: 69 BPM
EKG P AXIS: 118 DEGREES
EKG P AXIS: 98 DEGREES
EKG Q-T INTERVAL: 436 MS
EKG Q-T INTERVAL: 478 MS
EKG QRS DURATION: 128 MS
EKG QRS DURATION: 224 MS
EKG QTC CALCULATION (BAZETT): 467 MS
EKG QTC CALCULATION (BAZETT): 689 MS
EKG R AXIS: -29 DEGREES
EKG R AXIS: 55 DEGREES
EKG T AXIS: 65 DEGREES
EKG T AXIS: 92 DEGREES
EKG VENTRICULAR RATE: 125 BPM
EKG VENTRICULAR RATE: 69 BPM
GFR AFRICAN AMERICAN: >60 ML/MIN
GFR NON-AFRICAN AMERICAN: >60 ML/MIN
GFR SERPL CREATININE-BSD FRML MDRD: ABNORMAL ML/MIN/{1.73_M2}
GFR SERPL CREATININE-BSD FRML MDRD: ABNORMAL ML/MIN/{1.73_M2}
GLUCOSE BLD-MCNC: 104 MG/DL (ref 70–99)
MAGNESIUM: 2.3 MG/DL (ref 1.6–2.6)
PARTIAL THROMBOPLASTIN TIME: 36.8 SEC (ref 21.3–31.3)
PARTIAL THROMBOPLASTIN TIME: 45.4 SEC (ref 21.3–31.3)
PARTIAL THROMBOPLASTIN TIME: 48.5 SEC (ref 21.3–31.3)
PLATELET # BLD: 159 K/UL (ref 140–450)
POTASSIUM SERPL-SCNC: 3.7 MMOL/L (ref 3.7–5.3)
SODIUM BLD-SCNC: 141 MMOL/L (ref 135–144)

## 2017-10-19 PROCEDURE — 93567 NJX CAR CTH SPRVLV AORTGRPHY: CPT | Performed by: INTERNAL MEDICINE

## 2017-10-19 PROCEDURE — 6370000000 HC RX 637 (ALT 250 FOR IP): Performed by: NURSE PRACTITIONER

## 2017-10-19 PROCEDURE — 6370000000 HC RX 637 (ALT 250 FOR IP): Performed by: INTERNAL MEDICINE

## 2017-10-19 PROCEDURE — 6360000002 HC RX W HCPCS

## 2017-10-19 PROCEDURE — B2111ZZ FLUOROSCOPY OF MULTIPLE CORONARY ARTERIES USING LOW OSMOLAR CONTRAST: ICD-10-PCS | Performed by: INTERNAL MEDICINE

## 2017-10-19 PROCEDURE — 2060000000 HC ICU INTERMEDIATE R&B

## 2017-10-19 PROCEDURE — C1725 CATH, TRANSLUMIN NON-LASER: HCPCS

## 2017-10-19 PROCEDURE — 93460 R&L HRT ART/VENTRICLE ANGIO: CPT | Performed by: INTERNAL MEDICINE

## 2017-10-19 PROCEDURE — C1769 GUIDE WIRE: HCPCS

## 2017-10-19 PROCEDURE — 99232 SBSQ HOSP IP/OBS MODERATE 35: CPT | Performed by: FAMILY MEDICINE

## 2017-10-19 PROCEDURE — 2580000003 HC RX 258: Performed by: INTERNAL MEDICINE

## 2017-10-19 PROCEDURE — B2151ZZ FLUOROSCOPY OF LEFT HEART USING LOW OSMOLAR CONTRAST: ICD-10-PCS | Performed by: INTERNAL MEDICINE

## 2017-10-19 PROCEDURE — 36415 COLL VENOUS BLD VENIPUNCTURE: CPT

## 2017-10-19 PROCEDURE — 6360000002 HC RX W HCPCS: Performed by: INTERNAL MEDICINE

## 2017-10-19 PROCEDURE — 6370000000 HC RX 637 (ALT 250 FOR IP): Performed by: FAMILY MEDICINE

## 2017-10-19 PROCEDURE — B3101ZZ FLUOROSCOPY OF THORACIC AORTA USING LOW OSMOLAR CONTRAST: ICD-10-PCS | Performed by: INTERNAL MEDICINE

## 2017-10-19 PROCEDURE — 93005 ELECTROCARDIOGRAM TRACING: CPT

## 2017-10-19 PROCEDURE — 85730 THROMBOPLASTIN TIME PARTIAL: CPT

## 2017-10-19 PROCEDURE — C1894 INTRO/SHEATH, NON-LASER: HCPCS

## 2017-10-19 PROCEDURE — 85049 AUTOMATED PLATELET COUNT: CPT

## 2017-10-19 PROCEDURE — 4A023N8 MEASUREMENT OF CARDIAC SAMPLING AND PRESSURE, BILATERAL, PERCUTANEOUS APPROACH: ICD-10-PCS | Performed by: INTERNAL MEDICINE

## 2017-10-19 PROCEDURE — 7100000010 HC PHASE II RECOVERY - FIRST 15 MIN

## 2017-10-19 PROCEDURE — 80048 BASIC METABOLIC PNL TOTAL CA: CPT

## 2017-10-19 PROCEDURE — 83735 ASSAY OF MAGNESIUM: CPT

## 2017-10-19 PROCEDURE — 2500000003 HC RX 250 WO HCPCS: Performed by: INTERNAL MEDICINE

## 2017-10-19 RX ORDER — SODIUM CHLORIDE 0.9 % (FLUSH) 0.9 %
10 SYRINGE (ML) INJECTION EVERY 12 HOURS SCHEDULED
Status: DISCONTINUED | OUTPATIENT
Start: 2017-10-19 | End: 2017-10-19 | Stop reason: SDUPTHER

## 2017-10-19 RX ORDER — SODIUM CHLORIDE 0.9 % (FLUSH) 0.9 %
10 SYRINGE (ML) INJECTION PRN
Status: DISCONTINUED | OUTPATIENT
Start: 2017-10-19 | End: 2017-10-19 | Stop reason: SDUPTHER

## 2017-10-19 RX ORDER — ACETAMINOPHEN 325 MG/1
650 TABLET ORAL EVERY 4 HOURS PRN
Status: DISCONTINUED | OUTPATIENT
Start: 2017-10-19 | End: 2017-10-19 | Stop reason: SDUPTHER

## 2017-10-19 RX ADMIN — FUROSEMIDE 40 MG: 40 TABLET ORAL at 14:36

## 2017-10-19 RX ADMIN — HEPARIN SODIUM AND DEXTROSE 18 UNITS/KG/HR: 10000; 5 INJECTION INTRAVENOUS at 16:15

## 2017-10-19 RX ADMIN — CITALOPRAM 5 MG: 10 TABLET, FILM COATED ORAL at 14:36

## 2017-10-19 RX ADMIN — DILTIAZEM HYDROCHLORIDE 15 MG/HR: 5 INJECTION INTRAVENOUS at 18:46

## 2017-10-19 RX ADMIN — METOPROLOL TARTRATE 50 MG: 50 TABLET, FILM COATED ORAL at 23:04

## 2017-10-19 RX ADMIN — METOPROLOL TARTRATE 50 MG: 50 TABLET, FILM COATED ORAL at 07:52

## 2017-10-19 RX ADMIN — ASPIRIN 81 MG 81 MG: 81 TABLET ORAL at 14:36

## 2017-10-19 ASSESSMENT — ENCOUNTER SYMPTOMS
VOMITING: 0
DIARRHEA: 0
VOICE CHANGE: 0
SORE THROAT: 0
BLOOD IN STOOL: 0
NAUSEA: 0
CONSTIPATION: 0
COUGH: 0
WHEEZING: 0
SHORTNESS OF BREATH: 1
ABDOMINAL PAIN: 0
SINUS PRESSURE: 0

## 2017-10-19 ASSESSMENT — PAIN SCALES - GENERAL
PAINLEVEL_OUTOF10: 0

## 2017-10-19 NOTE — PROGRESS NOTES
Smoking Cessation - topics covered   []  Health Risks  []  Benefits of Quitting   []  Smoking Cessation  [x]  Patient has no history of tobacco use  []  Patient is former smoker. Patient quit in   [x]  No need for tobacco cessation education. []  Booklet given  []  Patient verbalizes understanding. []  Patient denies need for tobacco cessation education.   Lilian Garrido  1:29 PM

## 2017-10-19 NOTE — PLAN OF CARE
Christo Vick 18    Second Visit Note  For more detailed information please refer to the progress note of the day      10/19/2017    4:47 PM    Name:   Kevin Aden  MRN:     [id number]     Michellelyside:      [de-identified]   Room:   [address] [place] [address] Day:  2  Admit Date:  10/17/2017  4:54 AM    PCP:   Junior Mike MD  Code Status:  Full Code        Pt vitals were reviewed   New labs were reviewed   Patient was seen    Updated plan :     1. No symptoms , resume heparin . Surgery likely 10/23/17 . Discussed with staff Quirino Gaucher. Patient has no questions or symptoms at this time.         Behzad Mariscal MD  10/19/2017  4:47 PM     ]

## 2017-10-19 NOTE — PROGRESS NOTES
Smoking Cessation - topics covered   []  Health Risks  []  Benefits of Quitting   []  Smoking Cessation  [x]  Patient has no history of tobacco use  []  Patient is former smoker. Patient quit in   [x]  No need for tobacco cessation education. []  Booklet given  []  Patient verbalizes understanding. []  Patient denies need for tobacco cessation education.   Vivien Ramires  10:39 AM

## 2017-10-19 NOTE — PROGRESS NOTES
dizziness, lightheadedness. He denies any leg swelling. Initial evaluation in the emergency room showed heart rate 130s. Troponins negative. CT chest was performed to rule out pulmonary embolism and did not show any central or subsegmental pulmonary emboli. Patient was given Lasix and admitted for further evaluation and treatment for acute CHF. Patient had atrial fibrillation and was started on Cardizem infusion and heparin low-dose infusion. Underwent trans-esophageal echocardiogram on 10/18/17 which showed dysfunctional bioprosthetic mitral valve , mean gradient 15 mmHg. Patient was recommended redo surgery. PMH:  Past Medical History:   Diagnosis Date    Acute diastolic CHF (congestive heart failure) ([facility] [address] 10/17/2017      Allergies: No Known Allergies   Medications :    furosemide 40 mg Oral Daily   aspirin 81 mg Oral Daily   citalopram 5 mg Oral Daily   sodium chloride flush 10 mL Intravenous 2 times per day   metoprolol tartrate 50 mg Oral BID       Review of Systems   Review of Systems   Constitutional: Negative for activity change, appetite change, chills, fatigue, fever and unexpected weight change. HENT: Negative for congestion, mouth sores, postnasal drip, sinus pressure, sore throat and voice change. Eyes: Negative for visual disturbance. Respiratory: Positive for shortness of breath. Negative for cough and wheezing. Cardiovascular: Negative for chest pain and palpitations. Gastrointestinal: Negative for abdominal pain, blood in stool, constipation, diarrhea, nausea and vomiting. Endocrine: Negative for polyuria. Genitourinary: Negative for difficulty urinating, dysuria, frequency and urgency. Musculoskeletal: Negative for arthralgias, joint swelling and myalgias. Neurological: Negative for dizziness, tremors, speech difficulty, light-headedness and headaches.      Objective:   Current Vitals : Temp: 98.1 °F (36.7 °C),  Pulse: 73, Resp: 21, BP: 122/76, SpO2: 93 %  Last 24 Hrs reviewed. Lower Extremities : No ankle Edema , No calf Tenderness     Laboratory findings:    Recent Labs      10/17/17   0525  10/17/17   1518   WBC  12.9*  10.1   HGB  16.3  14.9   HCT  49.2  44.0   PLT  179  154     Recent Labs      10/17/17   0525  10/18/17   0653  10/18/17   1933   NA  140  142   --    K  4.1  3.7  4.3   CL  103  101   --    CO2  26  28   --    GLUCOSE  122*  124*   --    BUN  35*  30*   --    CREATININE  1.13  0.96   --    MG   --   2.2   --    CALCIUM  9.1  9.0   --      Recent Labs      10/17/17   0513  10/17/17   0525  10/17/17   0705   TSH   --   2.00   --    CHOL   --   193   --    HDL   --   37*   --    LDLCHOLESTEROL   --   130   --    CHOLHDLRATIO   --   5.2*   --    TRIG   --   131   --    VLDL   --   NOT REPORTED   --    TROPONINI  0.00   --   0.00      Imaging/Diagonstics:     CT chest pulmonary embolism 10/17/17 - no central or proximal segmental pulmonary embolus, mild to moderate CHF with small pleural effusion and pulmonary edema, 5 mm  lower lobe nodule.      EKG 10/17/17- sinus tachycardia    ALEENA - 10/18/17  Bioprosthetic mitral valve is seen. Thickened leaflets, severe mitral regurgitation with multiple jests seen. High velocity across the valve with mean gradient of 15 mm Hg suggestive of dysfunctional bioprosthetic mitral valve. Mild aortic and tricuspid regurgitation. Reduced left ventricular systolic function. No PFO seen by color Doppler. Cardiac cath 10/19/17   Severe 4+ mitral regurgitation.   Minimal CAD.   Normal LV systolic function.   Mildly dilated aortic root with mild AI.   Normal right heart pressures. Clinical Course : gradually improving  Assessment and Plan      1. Acute diastolic CHF- Continue  IV Lasix. Rate control with Cardizem infusion . 2. PAF - in NSR with Bigeminy . heparin infusion, Cardizem infusion. ? Sotalol . 3. Severe  mitral regurgitation- status post bioprosthetic valve replacement Feb 2007.  ALEENA shows dysfunctional

## 2017-10-19 NOTE — PROCEDURES
findings with the resident. I have seen and examined the patient and the key elements of the encounter have been performed by me. I agree with the assessment, plan and orders as documented by the resident With changes made to the note. Procedure performed by me.     Electronically signed by Mark Gambino MD on 10/20/2017 at 9:02 AM.    [facility]      [phone]

## 2017-10-20 LAB
ALBUMIN SERPL-MCNC: 3.5 G/DL (ref 3.5–5.2)
ALBUMIN/GLOBULIN RATIO: 1.3 (ref 1–2.5)
ALP BLD-CCNC: 73 U/L (ref 40–129)
ALT SERPL-CCNC: 90 U/L (ref 5–41)
ANION GAP SERPL CALCULATED.3IONS-SCNC: 12 MMOL/L (ref 9–17)
AST SERPL-CCNC: 34 U/L
BILIRUB SERPL-MCNC: 1.45 MG/DL (ref 0.3–1.2)
BUN BLDV-MCNC: 23 MG/DL (ref 6–20)
BUN/CREAT BLD: ABNORMAL (ref 9–20)
CALCIUM SERPL-MCNC: 8.7 MG/DL (ref 8.6–10.4)
CHLORIDE BLD-SCNC: 102 MMOL/L (ref 98–107)
CO2: 27 MMOL/L (ref 20–31)
CREAT SERPL-MCNC: 0.96 MG/DL (ref 0.7–1.2)
GFR AFRICAN AMERICAN: >60 ML/MIN
GFR NON-AFRICAN AMERICAN: >60 ML/MIN
GFR SERPL CREATININE-BSD FRML MDRD: ABNORMAL ML/MIN/{1.73_M2}
GFR SERPL CREATININE-BSD FRML MDRD: ABNORMAL ML/MIN/{1.73_M2}
GLUCOSE BLD-MCNC: 101 MG/DL (ref 70–99)
HCT VFR BLD CALC: 42.9 % (ref 41–53)
HEMOGLOBIN: 14.6 G/DL (ref 13.5–17.5)
MAGNESIUM: 2.3 MG/DL (ref 1.6–2.6)
MCH RBC QN AUTO: 28.3 PG (ref 26–34)
MCHC RBC AUTO-ENTMCNC: 34 G/DL (ref 31–37)
MCV RBC AUTO: 83.3 FL (ref 80–100)
PARTIAL THROMBOPLASTIN TIME: 54.7 SEC (ref 21.3–31.3)
PARTIAL THROMBOPLASTIN TIME: 57.3 SEC (ref 21.3–31.3)
PDW BLD-RTO: 13.3 % (ref 12.5–15.4)
PLATELET # BLD: 175 K/UL (ref 140–450)
PMV BLD AUTO: 10.5 FL (ref 6–12)
POTASSIUM SERPL-SCNC: 4.1 MMOL/L (ref 3.7–5.3)
RBC # BLD: 5.15 M/UL (ref 4.5–5.9)
SODIUM BLD-SCNC: 141 MMOL/L (ref 135–144)
TOTAL PROTEIN: 6.1 G/DL (ref 6.4–8.3)
WBC # BLD: 8.3 K/UL (ref 3.5–11)

## 2017-10-20 PROCEDURE — 2580000003 HC RX 258: Performed by: INTERNAL MEDICINE

## 2017-10-20 PROCEDURE — 6360000002 HC RX W HCPCS: Performed by: INTERNAL MEDICINE

## 2017-10-20 PROCEDURE — 36415 COLL VENOUS BLD VENIPUNCTURE: CPT

## 2017-10-20 PROCEDURE — 6360000002 HC RX W HCPCS: Performed by: NURSE PRACTITIONER

## 2017-10-20 PROCEDURE — 80053 COMPREHEN METABOLIC PANEL: CPT

## 2017-10-20 PROCEDURE — 85730 THROMBOPLASTIN TIME PARTIAL: CPT

## 2017-10-20 PROCEDURE — 85027 COMPLETE CBC AUTOMATED: CPT

## 2017-10-20 PROCEDURE — 2500000003 HC RX 250 WO HCPCS: Performed by: INTERNAL MEDICINE

## 2017-10-20 PROCEDURE — 2580000003 HC RX 258: Performed by: FAMILY MEDICINE

## 2017-10-20 PROCEDURE — 6370000000 HC RX 637 (ALT 250 FOR IP): Performed by: FAMILY MEDICINE

## 2017-10-20 PROCEDURE — 6370000000 HC RX 637 (ALT 250 FOR IP): Performed by: NURSE PRACTITIONER

## 2017-10-20 PROCEDURE — 99232 SBSQ HOSP IP/OBS MODERATE 35: CPT | Performed by: FAMILY MEDICINE

## 2017-10-20 PROCEDURE — 2060000000 HC ICU INTERMEDIATE R&B

## 2017-10-20 PROCEDURE — 83735 ASSAY OF MAGNESIUM: CPT

## 2017-10-20 PROCEDURE — 76937 US GUIDE VASCULAR ACCESS: CPT

## 2017-10-20 PROCEDURE — 6370000000 HC RX 637 (ALT 250 FOR IP): Performed by: INTERNAL MEDICINE

## 2017-10-20 RX ORDER — DIGOXIN 0.25 MG/ML
250 INJECTION INTRAMUSCULAR; INTRAVENOUS EVERY 4 HOURS
Status: COMPLETED | OUTPATIENT
Start: 2017-10-20 | End: 2017-10-20

## 2017-10-20 RX ORDER — LORAZEPAM 2 MG/ML
0.5 INJECTION INTRAMUSCULAR ONCE
Status: COMPLETED | OUTPATIENT
Start: 2017-10-20 | End: 2017-10-20

## 2017-10-20 RX ORDER — TRAZODONE HYDROCHLORIDE 50 MG/1
50 TABLET ORAL NIGHTLY PRN
Status: DISCONTINUED | OUTPATIENT
Start: 2017-10-20 | End: 2017-10-20

## 2017-10-20 RX ADMIN — CITALOPRAM 5 MG: 10 TABLET, FILM COATED ORAL at 08:01

## 2017-10-20 RX ADMIN — HEPARIN SODIUM AND DEXTROSE 20 UNITS/KG/HR: 10000; 5 INJECTION INTRAVENOUS at 19:18

## 2017-10-20 RX ADMIN — ASPIRIN 81 MG 81 MG: 81 TABLET ORAL at 08:00

## 2017-10-20 RX ADMIN — DILTIAZEM HYDROCHLORIDE 10 MG/HR: 5 INJECTION INTRAVENOUS at 10:54

## 2017-10-20 RX ADMIN — LORAZEPAM 0.5 MG: 2 INJECTION INTRAMUSCULAR; INTRAVENOUS at 22:53

## 2017-10-20 RX ADMIN — METOPROLOL TARTRATE 50 MG: 50 TABLET, FILM COATED ORAL at 20:51

## 2017-10-20 RX ADMIN — FUROSEMIDE 40 MG: 40 TABLET ORAL at 08:01

## 2017-10-20 RX ADMIN — METOPROLOL TARTRATE 50 MG: 50 TABLET, FILM COATED ORAL at 08:00

## 2017-10-20 RX ADMIN — HEPARIN SODIUM AND DEXTROSE 20 UNITS/KG/HR: 10000; 5 INJECTION INTRAVENOUS at 00:08

## 2017-10-20 RX ADMIN — DIGOXIN 250 MCG: 0.25 INJECTION INTRAMUSCULAR; INTRAVENOUS at 19:18

## 2017-10-20 RX ADMIN — Medication 10 ML: at 08:03

## 2017-10-20 RX ADMIN — SODIUM CHLORIDE: 9 INJECTION, SOLUTION INTRAVENOUS at 15:36

## 2017-10-20 RX ADMIN — HEPARIN SODIUM AND DEXTROSE 20 UNITS/KG/HR: 10000; 5 INJECTION INTRAVENOUS at 06:26

## 2017-10-20 RX ADMIN — DIGOXIN 250 MCG: 0.25 INJECTION INTRAMUSCULAR; INTRAVENOUS at 14:59

## 2017-10-20 RX ADMIN — HEPARIN SODIUM 2000 UNITS: 1000 INJECTION, SOLUTION INTRAVENOUS; SUBCUTANEOUS at 00:13

## 2017-10-20 ASSESSMENT — ENCOUNTER SYMPTOMS
BLOOD IN STOOL: 0
CONSTIPATION: 0
ABDOMINAL PAIN: 0
COUGH: 0
NAUSEA: 0
SHORTNESS OF BREATH: 1
SORE THROAT: 0
WHEEZING: 0
DIARRHEA: 0
VOMITING: 0
SINUS PRESSURE: 0
VOICE CHANGE: 0

## 2017-10-20 ASSESSMENT — PAIN SCALES - GENERAL
PAINLEVEL_OUTOF10: 0

## 2017-10-20 NOTE — FLOWSHEET NOTE
Reviewed pre op class with patient and wife, reviewed intubation, central line, swan, chest tubes, riggs, artline, and visitation. Pt and wife verbalized understanding.

## 2017-10-20 NOTE — PROGRESS NOTES
evaluation in the emergency room showed heart rate 130s. Troponins negative. CT chest was performed to rule out pulmonary embolism and did not show any central or subsegmental pulmonary emboli. Patient was given Lasix and admitted for further evaluation and treatment for acute CHF. Patient had atrial fibrillation and was started on Cardizem infusion and heparin low-dose infusion. Underwent trans-esophageal echocardiogram on 10/18/17 which showed dysfunctional bioprosthetic mitral valve , mean gradient 15 mmHg. Patient was recommended redo surgery. Patient underwent cardiac cath on 10/19/17 and showed normal coronaries. Cath was scheduled for 10/23/17. PMH:  Past Medical History:   Diagnosis Date    Acute diastolic CHF (congestive heart failure) ([facility] [address] 10/17/2017      Allergies: No Known Allergies   Medications :    furosemide 40 mg Oral Daily   aspirin 81 mg Oral Daily   citalopram 5 mg Oral Daily   sodium chloride flush 10 mL Intravenous 2 times per day   metoprolol tartrate 50 mg Oral BID       Review of Systems   Review of Systems   Constitutional: Negative for activity change, appetite change, chills, fatigue, fever and unexpected weight change. HENT: Negative for congestion, mouth sores, postnasal drip, sinus pressure, sore throat and voice change. Eyes: Negative for visual disturbance. Respiratory: Positive for shortness of breath (on exertion. ). Negative for cough and wheezing. Cardiovascular: Negative for chest pain and palpitations. Gastrointestinal: Negative for abdominal pain, blood in stool, constipation, diarrhea, nausea and vomiting. Endocrine: Negative for polyuria. Genitourinary: Negative for difficulty urinating, dysuria, frequency and urgency. Musculoskeletal: Negative for arthralgias, joint swelling and myalgias. Neurological: Negative for dizziness, tremors, speech difficulty, light-headedness and headaches.      Objective:   Current Vitals : Temp: 98.1 °F (36.7 °C), Pulse: 87, Resp: 16, BP: 138/88, SpO2: 96 %  Last 24 Hrs Vitals   Patient Vitals for the past 24 hrs:   BP Temp Temp src Pulse Resp SpO2   10/20/17 0800 138/88 - - 87 - -   10/20/17 0647 130/75 98.1 °F (36.7 °C) Oral 114 16 96 %   10/20/17 0411 126/83 98 °F (36.7 °C) Oral 77 18 -   10/19/17 2330 (!) 130/90 98.2 °F (36.8 °C) Oral 89 18 -   10/19/17 1845 118/62 97.7 °F (36.5 °C) Oral - 18 96 %   10/19/17 1530 107/74 - - 70 18 -   10/19/17 1515 114/76 - - 73 16 -   10/19/17 1500 114/89 - - 71 15 -   10/19/17 1445 98/85 - - 67 17 -   10/19/17 1430 121/73 - - 67 19 -   10/19/17 1415 106/69 - - 70 23 -   10/19/17 1400 (!) 84/42 - - 71 16 -   10/19/17 1345 (!) 90/53 - - 72 11 -   10/19/17 1330 (!) 92/56 - - 72 17 -   10/19/17 1315 (!) 95/51 - - 67 21 -   10/19/17 1300 97/69 - - 72 18 -   10/19/17 1245 102/75 - - 70 21 -   10/19/17 1230 103/73 - - 66 14 -   10/19/17 1215 104/78 - - 68 18 -   10/19/17 1200 112/79 - - 69 18 -   10/19/17 1145 107/80 - - 68 20 -   10/19/17 1130 107/78 - - 68 15 -   10/19/17 1115 107/78 - - 66 17 -   10/19/17 1100 104/81 - - 67 17 -   10/19/17 1036 109/76 - - - - -   10/19/17 0849 124/70 98.4 °F (36.9 °C) Oral 101 16 97 %     Intake / output   10/19 0701 - 10/20 0700  In: 1489.5 [P.O.:700; I.V.:789.5]  Out: 9271 [Urine:1575]  Physical Exam:  Physical Exam   Constitutional: He is oriented to person, place, and time. He appears well-developed and well-nourished. No distress. HENT:   Mouth/Throat: Oropharynx is clear and moist. No oropharyngeal exudate. Eyes: Pupils are equal, round, and reactive to light. No scleral icterus. Neck: Neck supple. No JVD present. No tracheal deviation present. No thyromegaly present. Cardiovascular: Normal rate, regular rhythm and intact distal pulses. Exam reveals no gallop and no friction rub. Murmur heard. High-pitched blowing holosystolic murmur is present with a grade of 3/6  at the apex  Pulmonary/Chest: Effort normal. No respiratory distress.  He has no wheezes. He has no rales. He exhibits no tenderness. Abdominal: Soft. Bowel sounds are normal. He exhibits no mass. There is no tenderness. There is no rebound and no guarding. Lymphadenopathy:     He has no cervical adenopathy. Neurological: He is alert and oriented to person, place, and time. No cranial nerve deficit. He exhibits normal muscle tone. Skin: Skin is warm. No rash noted. He is not diaphoretic. Psychiatric: He has a normal mood and affect. His behavior is normal.   Nursing note and vitals reviewed. Lower Extremities : No ankle Edema , No calf Tenderness     Laboratory findings:    Recent Labs      10/17/17   1518  10/19/17   0550  10/20/17   0653   WBC  10.1   --   8.3   HGB  14.9   --   14.6   HCT  44.0   --   42.9   PLT  154  159  175     Recent Labs      10/18/17   0653  10/18/17   1933  10/19/17   0550  10/20/17   0653   NA  142   --   141  141   K  3.7  4.3  3.7  4.1   CL  101   --   100  102   CO2  28   --   29  27   GLUCOSE  124*   --   104*  101*   BUN  30*   --   26*  23*   CREATININE  0.96   --   0.91  0.96   MG  2.2   --   2.3  2.3   CALCIUM  9.0   --   8.6  8.7     Recent Labs      10/20/17   0653   PROT  6.1*   LABALBU  3.5   AST  34   ALT  90*   ALKPHOS  73   BILITOT  1.45*      Imaging/Diagonstics:     CT chest pulmonary embolism 10/17/17 - no central or proximal segmental pulmonary embolus, mild to moderate CHF with small pleural effusion and pulmonary edema, 5 mm  lower lobe nodule.      EKG 10/17/17- sinus tachycardia    ALEENA - 10/18/17  Bioprosthetic mitral valve is seen. Thickened leaflets, severe mitral regurgitation with multiple jests seen. High velocity across the valve with mean gradient of 15 mm Hg suggestive of dysfunctional bioprosthetic mitral valve. Mild aortic and tricuspid regurgitation. Reduced left ventricular systolic function. No PFO seen by color Doppler.      Cardiac cath 10/19/17   Severe 4+ mitral regurgitation.   Minimal CAD.   Normal LV

## 2017-10-20 NOTE — PROGRESS NOTES
Will give IV dig today. 3. HFpEF-on Lasix.  Change to PO    Electronically signed by Robbi Acharya CNP on 10/20/2017 at [facility]. [phone]

## 2017-10-20 NOTE — PLAN OF CARE
Problem: Safety:  Goal: Free from accidental physical injury  Free from accidental physical injury   Outcome: Ongoing      Problem: HEMODYNAMIC STATUS  Goal: Patient has stable vital signs and fluid balance  Outcome: Ongoing

## 2017-10-21 ENCOUNTER — ANESTHESIA EVENT (OUTPATIENT)
Dept: OPERATING ROOM | Age: 57
DRG: 216 | End: 2017-10-21
Payer: COMMERCIAL

## 2017-10-21 LAB
ABSOLUTE EOS #: 0.2 K/UL (ref 0–0.4)
ABSOLUTE IMMATURE GRANULOCYTE: NORMAL K/UL (ref 0–0.3)
ABSOLUTE LYMPH #: 1.7 K/UL (ref 1–4.8)
ABSOLUTE MONO #: 0.5 K/UL (ref 0.1–1.2)
ALBUMIN SERPL-MCNC: 3.6 G/DL (ref 3.5–5.2)
ALBUMIN/GLOBULIN RATIO: 1.3 (ref 1–2.5)
ALP BLD-CCNC: 77 U/L (ref 40–129)
ALT SERPL-CCNC: 91 U/L (ref 5–41)
ANION GAP SERPL CALCULATED.3IONS-SCNC: 11 MMOL/L (ref 9–17)
AST SERPL-CCNC: 35 U/L
BASOPHILS # BLD: 1 %
BASOPHILS ABSOLUTE: 0 K/UL (ref 0–0.2)
BILIRUB SERPL-MCNC: 1.19 MG/DL (ref 0.3–1.2)
BILIRUBIN URINE: NEGATIVE
BUN BLDV-MCNC: 18 MG/DL (ref 6–20)
BUN/CREAT BLD: ABNORMAL (ref 9–20)
CALCIUM SERPL-MCNC: 9 MG/DL (ref 8.6–10.4)
CHLORIDE BLD-SCNC: 101 MMOL/L (ref 98–107)
CO2: 27 MMOL/L (ref 20–31)
COLOR: YELLOW
COMMENT UA: NORMAL
CREAT SERPL-MCNC: 0.9 MG/DL (ref 0.7–1.2)
CULTURE: NORMAL
DIFFERENTIAL TYPE: NORMAL
EOSINOPHILS RELATIVE PERCENT: 3 %
GFR AFRICAN AMERICAN: >60 ML/MIN
GFR NON-AFRICAN AMERICAN: >60 ML/MIN
GFR SERPL CREATININE-BSD FRML MDRD: ABNORMAL ML/MIN/{1.73_M2}
GFR SERPL CREATININE-BSD FRML MDRD: ABNORMAL ML/MIN/{1.73_M2}
GLUCOSE BLD-MCNC: 102 MG/DL (ref 70–99)
GLUCOSE URINE: NEGATIVE
HCT VFR BLD CALC: 42.5 % (ref 41–53)
HEMOGLOBIN: 14.4 G/DL (ref 13.5–17.5)
IMMATURE GRANULOCYTES: NORMAL %
INR BLD: 1
KETONES, URINE: NEGATIVE
LEUKOCYTE ESTERASE, URINE: NEGATIVE
LYMPHOCYTES # BLD: 27 %
Lab: NORMAL
MAGNESIUM: 2 MG/DL (ref 1.6–2.6)
MCH RBC QN AUTO: 28.4 PG (ref 26–34)
MCHC RBC AUTO-ENTMCNC: 33.9 G/DL (ref 31–37)
MCV RBC AUTO: 83.7 FL (ref 80–100)
MONOCYTES # BLD: 8 %
MRSA, DNA, NASAL: NORMAL
NITRITE, URINE: NEGATIVE
PARTIAL THROMBOPLASTIN TIME: 56.7 SEC (ref 21.3–31.3)
PARTIAL THROMBOPLASTIN TIME: 65.4 SEC (ref 21.3–31.3)
PARTIAL THROMBOPLASTIN TIME: 72.4 SEC (ref 21.3–31.3)
PDW BLD-RTO: 14.2 % (ref 12.5–15.4)
PH UA: 7 (ref 5–8)
PLATELET # BLD: 178 K/UL (ref 140–450)
PLATELET # BLD: 179 K/UL (ref 140–450)
PLATELET ESTIMATE: NORMAL
PMV BLD AUTO: 11.6 FL (ref 6–12)
POTASSIUM SERPL-SCNC: 4 MMOL/L (ref 3.7–5.3)
PROTEIN UA: NEGATIVE
PROTHROMBIN TIME: 11.1 SEC (ref 9.4–12.6)
RBC # BLD: 5.08 M/UL (ref 4.5–5.9)
RBC # BLD: NORMAL 10*6/UL
SEG NEUTROPHILS: 61 %
SEGMENTED NEUTROPHILS ABSOLUTE COUNT: 3.8 K/UL (ref 1.8–7.7)
SODIUM BLD-SCNC: 139 MMOL/L (ref 135–144)
SPECIFIC GRAVITY UA: 1.01 (ref 1–1.03)
SPECIMEN DESCRIPTION: NORMAL
SPECIMEN DESCRIPTION: NORMAL
STATUS: NORMAL
TOTAL PROTEIN: 6.3 G/DL (ref 6.4–8.3)
TURBIDITY: CLEAR
URINE HGB: NEGATIVE
UROBILINOGEN, URINE: NORMAL
WBC # BLD: 6.3 K/UL (ref 3.5–11)
WBC # BLD: NORMAL 10*3/UL

## 2017-10-21 PROCEDURE — 85025 COMPLETE CBC W/AUTO DIFF WBC: CPT

## 2017-10-21 PROCEDURE — 85610 PROTHROMBIN TIME: CPT

## 2017-10-21 PROCEDURE — 85730 THROMBOPLASTIN TIME PARTIAL: CPT

## 2017-10-21 PROCEDURE — 2500000003 HC RX 250 WO HCPCS: Performed by: INTERNAL MEDICINE

## 2017-10-21 PROCEDURE — 2580000003 HC RX 258: Performed by: INTERNAL MEDICINE

## 2017-10-21 PROCEDURE — 6370000000 HC RX 637 (ALT 250 FOR IP): Performed by: FAMILY MEDICINE

## 2017-10-21 PROCEDURE — 6370000000 HC RX 637 (ALT 250 FOR IP): Performed by: INTERNAL MEDICINE

## 2017-10-21 PROCEDURE — 2500000003 HC RX 250 WO HCPCS: Performed by: NURSE PRACTITIONER

## 2017-10-21 PROCEDURE — 6370000000 HC RX 637 (ALT 250 FOR IP): Performed by: THORACIC SURGERY (CARDIOTHORACIC VASCULAR SURGERY)

## 2017-10-21 PROCEDURE — 6360000002 HC RX W HCPCS: Performed by: INTERNAL MEDICINE

## 2017-10-21 PROCEDURE — 81003 URINALYSIS AUTO W/O SCOPE: CPT

## 2017-10-21 PROCEDURE — 36415 COLL VENOUS BLD VENIPUNCTURE: CPT

## 2017-10-21 PROCEDURE — 83735 ASSAY OF MAGNESIUM: CPT

## 2017-10-21 PROCEDURE — 87081 CULTURE SCREEN ONLY: CPT

## 2017-10-21 PROCEDURE — 85049 AUTOMATED PLATELET COUNT: CPT

## 2017-10-21 PROCEDURE — 93005 ELECTROCARDIOGRAM TRACING: CPT

## 2017-10-21 PROCEDURE — 80053 COMPREHEN METABOLIC PANEL: CPT

## 2017-10-21 PROCEDURE — 87086 URINE CULTURE/COLONY COUNT: CPT

## 2017-10-21 PROCEDURE — 2060000000 HC ICU INTERMEDIATE R&B

## 2017-10-21 PROCEDURE — 87641 MR-STAPH DNA AMP PROBE: CPT

## 2017-10-21 PROCEDURE — 99232 SBSQ HOSP IP/OBS MODERATE 35: CPT | Performed by: FAMILY MEDICINE

## 2017-10-21 PROCEDURE — 6370000000 HC RX 637 (ALT 250 FOR IP): Performed by: NURSE PRACTITIONER

## 2017-10-21 PROCEDURE — 2580000003 HC RX 258: Performed by: NURSE PRACTITIONER

## 2017-10-21 RX ORDER — AMIODARONE HYDROCHLORIDE 200 MG/1
200 TABLET ORAL 3 TIMES DAILY
Status: DISCONTINUED | OUTPATIENT
Start: 2017-10-21 | End: 2017-10-21

## 2017-10-21 RX ORDER — DOCUSATE SODIUM 100 MG/1
100 CAPSULE, LIQUID FILLED ORAL DAILY
Status: DISCONTINUED | OUTPATIENT
Start: 2017-10-21 | End: 2017-10-23

## 2017-10-21 RX ADMIN — DILTIAZEM HYDROCHLORIDE 5 MG/HR: 5 INJECTION INTRAVENOUS at 15:20

## 2017-10-21 RX ADMIN — AMIODARONE HYDROCHLORIDE 200 MG: 200 TABLET ORAL at 13:00

## 2017-10-21 RX ADMIN — DILTIAZEM HYDROCHLORIDE 10 MG/HR: 5 INJECTION INTRAVENOUS at 05:41

## 2017-10-21 RX ADMIN — FUROSEMIDE 40 MG: 40 TABLET ORAL at 08:21

## 2017-10-21 RX ADMIN — METOPROLOL TARTRATE 50 MG: 50 TABLET, FILM COATED ORAL at 08:21

## 2017-10-21 RX ADMIN — AMIODARONE HYDROCHLORIDE 200 MG: 200 TABLET ORAL at 09:04

## 2017-10-21 RX ADMIN — HEPARIN SODIUM AND DEXTROSE 18 UNITS/KG/HR: 10000; 5 INJECTION INTRAVENOUS at 12:56

## 2017-10-21 RX ADMIN — ASPIRIN 81 MG 81 MG: 81 TABLET ORAL at 08:21

## 2017-10-21 RX ADMIN — DOCUSATE SODIUM 100 MG: 100 CAPSULE, LIQUID FILLED ORAL at 09:04

## 2017-10-21 RX ADMIN — CITALOPRAM 5 MG: 10 TABLET, FILM COATED ORAL at 08:21

## 2017-10-21 RX ADMIN — METOPROLOL TARTRATE 50 MG: 50 TABLET, FILM COATED ORAL at 21:16

## 2017-10-21 ASSESSMENT — ENCOUNTER SYMPTOMS
VOICE CHANGE: 0
ABDOMINAL PAIN: 0
SINUS PRESSURE: 0
SHORTNESS OF BREATH: 1
NAUSEA: 0
DIARRHEA: 0
WHEEZING: 0
VOMITING: 0
COUGH: 0
SORE THROAT: 0
CONSTIPATION: 0
BLOOD IN STOOL: 0

## 2017-10-21 ASSESSMENT — PAIN SCALES - GENERAL
PAINLEVEL_OUTOF10: 0

## 2017-10-21 NOTE — PLAN OF CARE
Problem: HEMODYNAMIC STATUS  Goal: Patient has stable vital signs and fluid balance  Outcome: Ongoing

## 2017-10-21 NOTE — PLAN OF CARE
Christo Vick 18    Second Visit Note  For more detailed information please refer to the progress note of the day      10/21/2017    5:38 PM    Name:   Windy White  MRN:     [id number]     Michellelyside:      [de-identified]   Room:   [address] [place] [address] Day:  4  Admit Date:  10/17/2017  4:54 AM    PCP:   Marissa Sacks, MD  Code Status:  Full Code        Pt vitals were reviewed   New labs were reviewed   Patient was seen    Updated plan :     1. Patient had RVR . Now back on Cardizem. Amiodarone discontinued . Continue monitor .          Wilber Joe MD  10/21/2017  5:38 PM

## 2017-10-21 NOTE — PROGRESS NOTES
Kevin Quiroz at bedside to answer questions pt's have regarding type of valve and questions regarding results of echo

## 2017-10-21 NOTE — PROGRESS NOTES
Updated Delrae Dose on epidose of A fib with RVR. Explained that less ectopy noted this am with cardizem.  Telephone order rec'd to restart cardizem gtt as it was running this am

## 2017-10-21 NOTE — PROGRESS NOTES
[facility]   Progress Note                   Date:   10/21/2017  Patient name: Subhash Allred  Date of admission:  10/17/2017  4:54 AM  MRN:   [id number]  YOB: 1960  PCP: Bright Harmon MD    Reason for Admission: CHF (congestive heart failure), NYHA class II, acute on chronic, systolic (HCC) [T55.60]    Subjective:       Clinical Changes / Abnormalities:   Patient seen and examined. States feeling better. Sitting up in chair. D/W patient and patient's wife in detail. RN at bedside. HR controlled on Cardizem drip. Medications:   Scheduled Meds:   amiodarone  200 mg Oral TID    docusate sodium  100 mg Oral Daily    furosemide  40 mg Oral Daily    aspirin  81 mg Oral Daily    sodium chloride flush  10 mL Intravenous 2 times per day    metoprolol tartrate  50 mg Oral BID     Continuous Infusions:   diltiazem (CARDIZEM) 125 mg in dextrose 5% 125 mL infusion 5 mg/hr (10/21/17 0832)    heparin (porcine) 18 Units/kg/hr (10/21/17 0628)     CBC:   Recent Labs      10/20/17   0653  10/21/17   0552  10/21/17   0914   WBC  8.3   --   6.3   HGB  14.6   --   14.4   PLT  175  178  179     BMP:    Recent Labs      10/19/17   0550  10/20/17   0653  10/21/17   0552   NA  141  141  139   K  3.7  4.1  4.0   CL  100  102  101   CO2  29  27  27   BUN  26*  23*  18   CREATININE  0.91  0.96  0.90   GLUCOSE  104*  101*  102*     Hepatic:   Recent Labs      10/20/17   0653  10/21/17   0552   AST  34  35   ALT  90*  91*   BILITOT  1.45*  1.19   ALKPHOS  73  77     Troponin:   No results for input(s): TROPONINI in the last 72 hours. BNP: No results for input(s): BNP in the last 72 hours. Lipids:   No results for input(s): CHOL, HDL in the last 72 hours. Invalid input(s): LDLCALCU  INR: No results for input(s): INR in the last 72 hours. STRESS 7/27/17: Small inferior infarct. EF 50%. WMA.      TTE 7/27/17: EF 50%. Segmental WMA. Bioprosthetic MV has evidence of stenosis.      ALEENA on patient undergo left atrial appendage clipping/resection with MVR.     Sarah Oslon MD

## 2017-10-21 NOTE — ANESTHESIA PRE PROCEDURE
Department of Anesthesiology  Preprocedure Note       Name:  Cirsto Zuniga   Age:  61 y.o.  :  1960                                          MRN:  [id number]         Date:  10/21/2017      Surgeon: Dorothea Houston):  Flory Irving MD    Procedure: Procedure(s):  REDO STERNOTOMY, MECHANICAL MITRAL VALVE REPAIR REPLACEMENT    Medications prior to admission:   Prior to Admission medications    Medication Sig Start Date End Date Taking?  Authorizing Provider   atenolol (TENORMIN) 25 MG tablet Take 25 mg by mouth 2 times daily   Yes Historical Provider, MD   sotalol (BETAPACE) 40 MG split tablet Take 40 mg by mouth 2 times daily   Yes Historical Provider, MD   aspirin 81 MG tablet Take 81 mg by mouth daily   Yes Historical Provider, MD   Citalopram Hydrobromide (CELEXA PO) Take 5 mg by mouth   Yes Historical Provider, MD       Current medications:    Current Facility-Administered Medications   Medication Dose Route Frequency Provider Last Rate Last Dose    amiodarone (CORDARONE) tablet 200 mg  200 mg Oral TID Sabrina Brilliant, CNP   200 mg at 10/21/17 1300    docusate sodium (COLACE) capsule 100 mg  100 mg Oral Daily Flory Irving MD   100 mg at 10/21/17 0904    diltiazem 125 mg in dextrose 5 % 125 mL infusion  10 mg/hr Intravenous Continuous Juan Manuel Perry MD   Stopped at 10/21/17 0904    furosemide (LASIX) tablet 40 mg  40 mg Oral Daily Neelam Bloch, CNP   40 mg at 10/21/17 3258    aspirin chewable tablet 81 mg  81 mg Oral Daily Gus Alvarez MD   81 mg at 10/21/17 2344    sodium chloride flush 0.9 % injection 10 mL  10 mL Intravenous 2 times per day Gus Alvarez MD   10 mL at 10/20/17 0803    sodium chloride flush 0.9 % injection 10 mL  10 mL Intravenous PRN Gus Alvarez MD        potassium chloride (KLOR-CON M) extended release tablet 40 mEq  40 mEq Oral PRN Gus Alvarez MD        Or    potassium chloride 20 MEQ/15ML (10%) oral solution 40 mEq  40 mEq Oral PRN Gus Alvarez MD   40 mEq Answered      Vital Signs (Current):   Vitals:    10/21/17 0804 10/21/17 0900 10/21/17 1000 10/21/17 1250   BP: 133/86   137/75   Pulse: 65 85 66 82   Resp: 16   18   Temp: 97.5 °F (36.4 °C)   98.2 °F (36.8 °C)   TempSrc: Oral   Oral   SpO2: 98%   97%   Weight:       Height:                                                  BP Readings from Last 3 Encounters:   10/21/17 137/75       NPO Status:                                                                                 BMI:   Wt Readings from Last 3 Encounters:   10/21/17 203 lb 7.8 oz (92.3 kg)     Body mass index is 30.05 kg/m². CBC:   Lab Results   Component Value Date    WBC 6.3 10/21/2017    RBC 5.08 10/21/2017    RBC 5.67 04/11/2012    HGB 14.4 10/21/2017    HCT 42.5 10/21/2017    MCV 83.7 10/21/2017    RDW 14.2 10/21/2017     10/21/2017     04/11/2012       CMP:   Lab Results   Component Value Date     10/21/2017    K 4.0 10/21/2017     10/21/2017    CO2 27 10/21/2017    BUN 18 10/21/2017    CREATININE 0.90 10/21/2017    GFRAA >60 10/21/2017    LABGLOM >60 10/21/2017    GLUCOSE 102 10/21/2017    GLUCOSE 96 04/11/2012    PROT 6.3 10/21/2017    CALCIUM 9.0 10/21/2017    BILITOT 1.19 10/21/2017    ALKPHOS 77 10/21/2017    AST 35 10/21/2017    ALT 91 10/21/2017       POC Tests: No results for input(s): POCGLU, POCNA, POCK, POCCL, POCBUN, POCHEMO, POCHCT in the last 72 hours. Coags:   Lab Results   Component Value Date    PROTIME 11.1 10/21/2017    INR 1.0 10/21/2017    APTT 65.4 10/21/2017       HCG (If Applicable): No results found for: PREGTESTUR, PREGSERUM, HCG, HCGQUANT     ABGs: No results found for: PHART, PO2ART, YJR0XCE, EKZ8IFL, BEART, Y4JPVXXR     Type & Screen (If Applicable):  No results found for: [facility]      ALEENA 10/18/2017  The study was performed by the attending and the sonographer. The study was performed under conscious sedation. Normal left ventricular size.   Reduced left ventricular systolic function. Inter-atrial septum is intact with no evidence for an atrial septal defect  by color doppler. Bioprosthetic mitral valve seen, leaflets appear thickened with severe  mitral regurgitation, multiple jets seen. Increased velocities across the mitral valve with a mean gradient of 15mmHg,  suggesting dysfunctional bioprosthetic valve. Mild aortic insufficiency. Mild tricuspid regurgitation. Cardiac cath 10/19/2017  Severe 4+ mitral regurgitation.   Minimal CAD.   Normal LV systolic function.   Mildly dilated aortic root with mild AI.   Normal right heart pressures. Anesthesia Evaluation  Patient summary reviewed and Nursing notes reviewed no history of anesthetic complications:   Airway: Mallampati: III  TM distance: >3 FB   Neck ROM: full  Mouth opening: > = 3 FB Dental: normal exam         Pulmonary:negative ROS and normal exam                               Cardiovascular:    (+) valvular problems/murmurs: MR, dysrhythmias: atrial fibrillation, CHF:, murmur,       ECG reviewed  Rhythm: irregular  Rate: abnormal  Echocardiogram reviewed                  Neuro/Psych:   {neg ROS              GI/Hepatic/Renal: neg ROS            Endo/Other: negative ROS                    Abdominal:   (+) obese,         Vascular:                                  Anesthesia Plan      general     ASA 4     (ASA 4 for cardiomyopathy)  Induction: intravenous. arterial line, BIS, central line, CVP, PA catheter and ALEENA    Anesthetic plan and risks discussed with patient. Use of blood products discussed with patient whom consented to blood products. Plan discussed with CRNA.                 Jackie Cotton MD   10/21/2017

## 2017-10-21 NOTE — PLAN OF CARE
Problem: HEMODYNAMIC STATUS  Goal: Patient has stable vital signs and fluid balance  Outcome: Ongoing  Monitor I & O and vitals    Problem: Falls - Risk of:  Goal: Will remain free from falls  Will remain free from falls  Outcome: Ongoing  . Bed in low locked position, bed alarms on non skid slippers on, call light within reach  Goal: Absence of physical injury  Absence of physical injury  Outcome: Ongoing      Problem: Bleeding:  Goal: Will show no signs and symptoms of excessive bleeding  Will show no signs and symptoms of excessive bleeding  Outcome: Ongoing  Monitor right groin for s/s of bleeding.  Explain s/s of things of things to report to pt

## 2017-10-21 NOTE — PROGRESS NOTES
Patient denies any fever, dizziness, lightheadedness. He denies any leg swelling. Initial evaluation in the emergency room showed heart rate 130s. Troponins negative. CT chest was performed to rule out pulmonary embolism and did not show any central or subsegmental pulmonary emboli. Patient was given Lasix and admitted for further evaluation and treatment for acute CHF. Patient had atrial fibrillation and was started on Cardizem infusion and heparin low-dose infusion. Underwent trans-esophageal echocardiogram on 10/18/17 which showed dysfunctional bioprosthetic mitral valve , mean gradient 15 mmHg. Patient was recommended redo surgery. Patient underwent cardiac cath on 10/19/17 and showed normal coronaries. Cath was scheduled for 10/23/17. PMH:  Past Medical History:   Diagnosis Date    Acute diastolic CHF (congestive heart failure) ([facility] [address] 10/17/2017      Allergies: No Known Allergies   Medications :    furosemide 40 mg Oral Daily   aspirin 81 mg Oral Daily   sodium chloride flush 10 mL Intravenous 2 times per day   metoprolol tartrate 50 mg Oral BID       Review of Systems   Review of Systems   Constitutional: Negative for activity change, appetite change, chills, fatigue, fever and unexpected weight change. HENT: Negative for congestion, mouth sores, postnasal drip, sinus pressure, sore throat and voice change. Eyes: Negative for visual disturbance. Respiratory: Positive for shortness of breath (on exertion. ). Negative for cough and wheezing. Cardiovascular: Negative for chest pain and palpitations. Gastrointestinal: Negative for abdominal pain, blood in stool, constipation, diarrhea, nausea and vomiting. Endocrine: Negative for polyuria. Genitourinary: Negative for difficulty urinating, dysuria, frequency and urgency. Musculoskeletal: Negative for arthralgias, joint swelling and myalgias.    Neurological: Negative for dizziness, tremors, speech difficulty, light-headedness and headaches. Objective:   Current Vitals : Temp: 97.5 °F (36.4 °C),  Pulse: 65, Resp: 16, BP: 133/86, SpO2: 98 %  Last 24 Hrs Vitals   Patient Vitals for the past 24 hrs:   BP Temp Temp src Pulse Resp SpO2 Weight   10/21/17 0804 133/86 97.5 °F (36.4 °C) Oral 65 16 98 % -   10/21/17 0600 - - - 66 - 97 % -   10/21/17 0500 - - - 64 - 93 % -   10/21/17 0400 - 98.6 °F (37 °C) Oral 65 12 93 % 203 lb 7.8 oz (92.3 kg)   10/21/17 0300 - - - 64 - 93 % -   10/21/17 0200 - - - 66 - 94 % -   10/21/17 0100 - - - 66 - 93 % -   10/21/17 0000 137/68 98.6 °F (37 °C) Oral 64 - 91 % -   10/20/17 2300 - - - 64 - 95 % -   10/20/17 2224 138/87 - - 71 - 96 % 203 lb 6.4 oz (92.3 kg)   10/20/17 2200 - - - 80 - - -   10/20/17 2100 - 98.7 °F (37.1 °C) Oral 88 12 95 % -   10/20/17 2000 - - - 91 - 96 % -   10/20/17 1900 - - - 89 - 98 % -   10/20/17 1730 (!) 146/74 97.7 °F (36.5 °C) Oral - 16 99 % -   10/20/17 1535 (!) 161/81 97.6 °F (36.4 °C) Oral 92 16 99 % -   10/20/17 1500 (!) 128/92 - - 130 - - -   10/20/17 1459 - - - 130 - - -   10/20/17 1128 109/74 98 °F (36.7 °C) Oral 97 16 98 % -     Intake / output   10/20 0701 - 10/21 0700  In: 1688.7 [P.O.:960; I.V.:728.7]  Out: 2200 [Urine:2200]  Physical Exam:  Physical Exam   Constitutional: He is oriented to person, place, and time. He appears well-developed and well-nourished. No distress. HENT:   Mouth/Throat: Oropharynx is clear and moist. No oropharyngeal exudate. Eyes: Pupils are equal, round, and reactive to light. No scleral icterus. Neck: Neck supple. No JVD present. No tracheal deviation present. No thyromegaly present. Cardiovascular: Normal rate, regular rhythm and intact distal pulses. Exam reveals no gallop and no friction rub. Murmur heard. High-pitched blowing holosystolic murmur is present with a grade of 3/6  at the apex  Pulmonary/Chest: Effort normal. No respiratory distress. He has no wheezes. He has no rales. He exhibits no tenderness. Abdominal: Soft.  Bowel

## 2017-10-21 NOTE — PROGRESS NOTES
A flutter noted on monitor. Pt sitting up in chair eating. Pt denies any c/o. Pt denies H/a, CP and SOB. Pt states he can tell his heart is beating fast but states he does not feel bad. Will continue to monitor.

## 2017-10-22 LAB
ABSOLUTE EOS #: 0.3 K/UL (ref 0–0.4)
ABSOLUTE IMMATURE GRANULOCYTE: NORMAL K/UL (ref 0–0.3)
ABSOLUTE LYMPH #: 1.3 K/UL (ref 1–4.8)
ABSOLUTE MONO #: 0.4 K/UL (ref 0.1–1.2)
ANION GAP SERPL CALCULATED.3IONS-SCNC: 10 MMOL/L (ref 9–17)
BASOPHILS # BLD: 1 %
BASOPHILS ABSOLUTE: 0 K/UL (ref 0–0.2)
BUN BLDV-MCNC: 20 MG/DL (ref 6–20)
BUN/CREAT BLD: ABNORMAL (ref 9–20)
CALCIUM SERPL-MCNC: 9 MG/DL (ref 8.6–10.4)
CHLORIDE BLD-SCNC: 101 MMOL/L (ref 98–107)
CO2: 30 MMOL/L (ref 20–31)
CREAT SERPL-MCNC: 0.95 MG/DL (ref 0.7–1.2)
CULTURE: NORMAL
CULTURE: NORMAL
DIFFERENTIAL TYPE: NORMAL
EOSINOPHILS RELATIVE PERCENT: 5 %
GFR AFRICAN AMERICAN: >60 ML/MIN
GFR NON-AFRICAN AMERICAN: >60 ML/MIN
GFR SERPL CREATININE-BSD FRML MDRD: ABNORMAL ML/MIN/{1.73_M2}
GFR SERPL CREATININE-BSD FRML MDRD: ABNORMAL ML/MIN/{1.73_M2}
GLUCOSE BLD-MCNC: 103 MG/DL (ref 70–99)
GLUCOSE BLD-MCNC: 94 MG/DL (ref 75–110)
HCT VFR BLD CALC: 41.6 % (ref 41–53)
HEMOGLOBIN: 14.2 G/DL (ref 13.5–17.5)
IMMATURE GRANULOCYTES: NORMAL %
LYMPHOCYTES # BLD: 26 %
Lab: NORMAL
MAGNESIUM: 2.2 MG/DL (ref 1.6–2.6)
MCH RBC QN AUTO: 28.2 PG (ref 26–34)
MCHC RBC AUTO-ENTMCNC: 34.1 G/DL (ref 31–37)
MCV RBC AUTO: 82.9 FL (ref 80–100)
MONOCYTES # BLD: 8 %
PARTIAL THROMBOPLASTIN TIME: 61 SEC (ref 21.3–31.3)
PDW BLD-RTO: 14 % (ref 12.5–15.4)
PLATELET # BLD: 158 K/UL (ref 140–450)
PLATELET ESTIMATE: NORMAL
PMV BLD AUTO: 10.6 FL (ref 6–12)
POTASSIUM SERPL-SCNC: 4.2 MMOL/L (ref 3.7–5.3)
RBC # BLD: 5.02 M/UL (ref 4.5–5.9)
RBC # BLD: NORMAL 10*6/UL
SEG NEUTROPHILS: 60 %
SEGMENTED NEUTROPHILS ABSOLUTE COUNT: 3.1 K/UL (ref 1.8–7.7)
SODIUM BLD-SCNC: 141 MMOL/L (ref 135–144)
SPECIMEN DESCRIPTION: NORMAL
STATUS: NORMAL
WBC # BLD: 5.1 K/UL (ref 3.5–11)
WBC # BLD: NORMAL 10*3/UL

## 2017-10-22 PROCEDURE — 6370000000 HC RX 637 (ALT 250 FOR IP): Performed by: THORACIC SURGERY (CARDIOTHORACIC VASCULAR SURGERY)

## 2017-10-22 PROCEDURE — 80048 BASIC METABOLIC PNL TOTAL CA: CPT

## 2017-10-22 PROCEDURE — 6370000000 HC RX 637 (ALT 250 FOR IP): Performed by: FAMILY MEDICINE

## 2017-10-22 PROCEDURE — 2580000003 HC RX 258: Performed by: NURSE PRACTITIONER

## 2017-10-22 PROCEDURE — 2500000003 HC RX 250 WO HCPCS: Performed by: NURSE PRACTITIONER

## 2017-10-22 PROCEDURE — 86900 BLOOD TYPING SEROLOGIC ABO: CPT

## 2017-10-22 PROCEDURE — 85025 COMPLETE CBC W/AUTO DIFF WBC: CPT

## 2017-10-22 PROCEDURE — 99232 SBSQ HOSP IP/OBS MODERATE 35: CPT | Performed by: FAMILY MEDICINE

## 2017-10-22 PROCEDURE — 6360000002 HC RX W HCPCS: Performed by: INTERNAL MEDICINE

## 2017-10-22 PROCEDURE — 6370000000 HC RX 637 (ALT 250 FOR IP): Performed by: INTERNAL MEDICINE

## 2017-10-22 PROCEDURE — 85730 THROMBOPLASTIN TIME PARTIAL: CPT

## 2017-10-22 PROCEDURE — 86901 BLOOD TYPING SEROLOGIC RH(D): CPT

## 2017-10-22 PROCEDURE — 86850 RBC ANTIBODY SCREEN: CPT

## 2017-10-22 PROCEDURE — 2060000000 HC ICU INTERMEDIATE R&B

## 2017-10-22 PROCEDURE — 83735 ASSAY OF MAGNESIUM: CPT

## 2017-10-22 PROCEDURE — 36415 COLL VENOUS BLD VENIPUNCTURE: CPT

## 2017-10-22 PROCEDURE — 86920 COMPATIBILITY TEST SPIN: CPT

## 2017-10-22 PROCEDURE — 82947 ASSAY GLUCOSE BLOOD QUANT: CPT

## 2017-10-22 PROCEDURE — 6370000000 HC RX 637 (ALT 250 FOR IP): Performed by: NURSE PRACTITIONER

## 2017-10-22 RX ORDER — CHLORHEXIDINE GLUCONATE 0.12 MG/ML
15 RINSE ORAL ONCE
Status: COMPLETED | OUTPATIENT
Start: 2017-10-22 | End: 2017-10-23

## 2017-10-22 RX ORDER — LORAZEPAM 0.5 MG/1
0.5 TABLET ORAL EVERY 4 HOURS PRN
Status: DISCONTINUED | OUTPATIENT
Start: 2017-10-22 | End: 2017-10-23

## 2017-10-22 RX ADMIN — METOPROLOL TARTRATE 50 MG: 50 TABLET, FILM COATED ORAL at 08:52

## 2017-10-22 RX ADMIN — HEPARIN SODIUM AND DEXTROSE 18 UNITS/KG/HR: 10000; 5 INJECTION INTRAVENOUS at 04:49

## 2017-10-22 RX ADMIN — FUROSEMIDE 40 MG: 40 TABLET ORAL at 08:53

## 2017-10-22 RX ADMIN — DILTIAZEM HYDROCHLORIDE 10 MG/HR: 5 INJECTION INTRAVENOUS at 01:58

## 2017-10-22 RX ADMIN — LORAZEPAM 0.5 MG: 0.5 TABLET ORAL at 13:01

## 2017-10-22 RX ADMIN — DOCUSATE SODIUM 100 MG: 100 CAPSULE, LIQUID FILLED ORAL at 08:52

## 2017-10-22 RX ADMIN — DILTIAZEM HYDROCHLORIDE 7.5 MG/HR: 5 INJECTION INTRAVENOUS at 16:27

## 2017-10-22 RX ADMIN — ASPIRIN 81 MG 81 MG: 81 TABLET ORAL at 08:52

## 2017-10-22 RX ADMIN — METOPROLOL TARTRATE 50 MG: 50 TABLET, FILM COATED ORAL at 21:32

## 2017-10-22 RX ADMIN — HEPARIN SODIUM AND DEXTROSE 1640 UNITS/HR: 10000; 5 INJECTION INTRAVENOUS at 20:20

## 2017-10-22 ASSESSMENT — ENCOUNTER SYMPTOMS
NAUSEA: 0
BLOOD IN STOOL: 0
DIARRHEA: 0
VOICE CHANGE: 0
WHEEZING: 0
COUGH: 0
VOMITING: 0
CONSTIPATION: 0
SHORTNESS OF BREATH: 1
SORE THROAT: 0
ABDOMINAL PAIN: 0
SINUS PRESSURE: 0

## 2017-10-22 ASSESSMENT — PAIN SCALES - GENERAL
PAINLEVEL_OUTOF10: 0

## 2017-10-22 NOTE — PROGRESS NOTES
10/18/17:  Bioprosthetic mitral valve is seen. Thickened leaflets, severe mitral regurgitation with multiple jests seen. High velocity across the valve with mean gradient of 15 mm Hg suggestive of dysfunctional bioprosthetic mitral valve. Mild aortic and tricuspid regurgitation. Reduced left ventricular systolic function. No PFO seen by color Doppler. Condition discussed with family and patient, consider CT surgery evaluation. Will need RHC and zohreh nary angiography before any anticipated valve redo surgery. No complications    CARDIAC CATH 10/19/17  Severe 4+ mitral regurgitation  Minimal CAD  Normal LV systolic function  Mildly dilated aortic root with mild AI  Normal right heart pressures      Objective:   Vitals: /89   Pulse 95   Temp 97.4 °F (36.3 °C) (Oral)   Resp 16   Ht 5' 9\" (1.753 m)   Wt 196 lb 6.4 oz (89.1 kg)   SpO2 99%   BMI 29.00 kg/m²   General appearance: alert and cooperative with exam  HEENT: Head: Normocephalic, no lesions, without obvious abnormality. Neck: no JVD, trachea midline, no adenopathy  Lungs: Clear to auscultation  Heart: Irregular rhythm with normal rate, s1/s2 auscultated, +3/6 murmur left axillary region. Abdomen: soft, non-tender, bowel sounds active  Extremities: no edema. Right groin soft, no drainage, no hematoma, no ecchymosis   Neurologic: not done      Assessment / Acute Cardiac Problems:   1. Acute Diastolic CHF  2. Severe Mitral Regurgitation  3. A. Fib with RVR - requiring Cardizem drip (CHADSVASC of 1)  4. H/O MVR with bioprosthetic valve in 2007 at [facility]  5. H/O A. Fib ablation       Plan of Treatment:   1. CT surgery input appreciated  2. Plan for mechanical prosthetic MVR on Monday (10/23)  3. On Cardizem drip and Heparin drip  4.  Monitor labs      Electronically signed on 10/22/17 at 8:46 AM by:    Yumiko Saldaña MD   Fellow, [address]

## 2017-10-22 NOTE — PROGRESS NOTES
Notified Dr. Zuleika Bean of pt. Symptoms not resolving. Updated on Current rhythm, meds, etc. Order received to start Ativan 0.5 mg PO q4 PRN for anxiety.

## 2017-10-22 NOTE — FLOWSHEET NOTE
· Facts: Patient was sitting up and had visitors. They welcomed the visit and spoke in detail about patient's upcoming surgery and past surgery. He said he had a mitral valve replacement 10 years ago and he has never really felt right since then. Patient works as a . He says that the medication he may have to take for the rest of his life may affect his ability to do this work. · Feelings:  Patient stated that he is trying to be positive about this surgery. He stated that his recovery from the past surgery was difficult and he is not looking forward to it this time. Patient's mom stated that his last surgery was more extensive due to complications so she hopes this recovery will be easier for him. · Family: Patient's wife and mother were present. Patient has two children and two grandchildren. · Camille: Patient and family belong to [employer]. They stated that the  has been here and prayed with the family. They asked  for continued prayers. · Follow-up: Chaplains to follow up with patient as needed.  provided a listening presence and assured patient of family of continued support if needed. 10/22/17 1049   Encounter Summary   Services provided to: Patient and family together   Referral/Consult From: [facility] Completed  (By family)   Continue Visiting (10/21/17)   Complexity of Encounter Low   Length of Encounter 30 minutes   Spiritual Assessment Completed Yes   Routine   Type Pre-procedure   Assessment Calm; Approachable   Intervention Active listening;Explored feelings, thoughts, concerns;Nurtured hope;Discussed illness/injury and it's impact   Outcome Expressed gratitude;Engaged in conversation;Expressed feelings/needs/concerns;Receptive

## 2017-10-22 NOTE — PLAN OF CARE
Christo Vick 18    Second Visit Note  For more detailed information please refer to the progress note of the day      10/22/2017    5:02 PM    Name:   Cristo Zuniga  MRN:     [id number]     Bhaskaride:      [de-identified]   Room:   [address] [place] [address] Day:  5  Admit Date:  10/17/2017  4:54 AM    PCP:   Anand Vee MD  Code Status:  Full Code        Pt vitals were reviewed   New labs were reviewed   Patient was seen    Updated plan :     1. Patient had chest pain earlier and received Ativan and is improved. Otherwise comfortable. Heart rate is controlled with Cardizem infusion. Surgery planned for tomorrow.         Gus Alvarez MD  10/22/2017  5:02 PM

## 2017-10-22 NOTE — PROGRESS NOTES
Daily Progress Note     Admit Date: 10/17/2017  Bed/Room No.  1004/1004-01  Admitting Physician : Hakeem Hicks MD  Code Status :Full Code  Hospital Day:  LOS: 5 days   Complaint at Admission :   Chief Complaint   Patient presents with    Shortness of Breath     worsening since wednesday, unlabored breathing, NAD, cardiac hx     Principal Problem:    Acute diastolic CHF (congestive heart failure) ([facility] [address]  Active Problems: Moderate mitral regurgitation    Paroxysmal atrial fibrillation (HCC)    Prolonged QT interval    Subjective: Interval History/Significant events :  10/22/17    Patient  Is having paroxysmal A. fib flutter. Heart rate is controlled. Patient denies any palpitations, chest pain, dizziness, lightheadedness, difficulty breathing. He remains on heparin infusion and Cardizem infusion. .  Blood pressure is controlled. Vitals - Stable afebrile  Labs - stable , normal kidney function and lites. Nursing notes , Consults notes reviewed. Overnight events and updates discussed with Nursing staff . Background history    [facility] , in hospital for 5 days . Rodolfo Chase 61 y.o. male   is admitted to the hospital for the management of  Acute diastolic CHF, moderate mitral regurgitation. Patient came to emergency room for shortness of breath. He reported that he has been having difficulty in breathing since May 2017. Patient followed with his cardiologist in July and had stress test and echocardiogram.  He was found to have moderate mitral regurgitation. Patient has underlying history of mitral valve replacement in February 2007. He has history of paroxysmal atrial fibrillation and has been on sotalol and atenolol. Patient had unsuccessful. Abrasion before. He also had Maze procedure performed during surgery 2007. Patient takes aspirin 81 mg daily. He reported cough, with clear sputum, orthopnea, PND.   Patient denied any mass. There is no tenderness. There is no rebound and no guarding. Lymphadenopathy:     He has no cervical adenopathy. Neurological: He is alert and oriented to person, place, and time. No cranial nerve deficit. He exhibits normal muscle tone. Skin: Skin is warm. No rash noted. He is not diaphoretic. Psychiatric: He has a normal mood and affect. His behavior is normal.   Nursing note and vitals reviewed. Lower Extremities : No ankle Edema , No calf Tenderness     Laboratory findings:    Recent Labs      10/20/17   0653  10/21/17   0552  10/21/17   0914  10/21/17   1121  10/22/17   0457   WBC  8.3   --   6.3   --   5.1   HGB  14.6   --   14.4   --   14.2   HCT  42.9   --   42.5   --   41.6   PLT  175  178  179   --   158   INR   --    --    --   1.0   --      Recent Labs      10/20/17   0653  10/21/17   0552  10/22/17   0457   NA  141  139  141   K  4.1  4.0  4.2   CL  102  101  101   CO2  27  27  30   GLUCOSE  101*  102*  103*   BUN  23*  18  20   CREATININE  0.96  0.90  0.95   MG  2.3  2.0  2.2   CALCIUM  8.7  9.0  9.0     Recent Labs      10/20/17   0653  10/21/17   0552   PROT  6.1*  6.3*   LABALBU  3.5  3.6   AST  34  35   ALT  90*  91*   ALKPHOS  73  77   BILITOT  1.45*  1.19      Imaging/Diagonstics:     CT chest pulmonary embolism 10/17/17 - no central or proximal segmental pulmonary embolus, mild to moderate CHF with small pleural effusion and pulmonary edema, 5 mm  lower lobe nodule.      EKG 10/17/17- sinus tachycardia    ALEENA - 10/18/17  Bioprosthetic mitral valve is seen. Thickened leaflets, severe mitral regurgitation with multiple jests seen. High velocity across the valve with mean gradient of 15 mm Hg suggestive of dysfunctional bioprosthetic mitral valve. Mild aortic and tricuspid regurgitation. Reduced left ventricular systolic function. No PFO seen by color Doppler.      Cardiac cath 10/19/17   Severe 4+ mitral regurgitation.   Minimal CAD.   Normal LV systolic function.  Jolene Villar

## 2017-10-23 ENCOUNTER — APPOINTMENT (OUTPATIENT)
Dept: GENERAL RADIOLOGY | Age: 57
DRG: 216 | End: 2017-10-23
Payer: COMMERCIAL

## 2017-10-23 ENCOUNTER — ANESTHESIA (OUTPATIENT)
Dept: OPERATING ROOM | Age: 57
DRG: 216 | End: 2017-10-23
Payer: COMMERCIAL

## 2017-10-23 VITALS — DIASTOLIC BLOOD PRESSURE: 57 MMHG | TEMPERATURE: 98.3 F | SYSTOLIC BLOOD PRESSURE: 94 MMHG | OXYGEN SATURATION: 95 %

## 2017-10-23 LAB
ALLEN TEST: ABNORMAL
ANION GAP SERPL CALCULATED.3IONS-SCNC: 14 MMOL/L (ref 9–17)
ANION GAP SERPL CALCULATED.3IONS-SCNC: 18 MMOL/L (ref 9–17)
ANION GAP SERPL CALCULATED.3IONS-SCNC: 18 MMOL/L (ref 9–17)
ANION GAP: 14 MMOL/L (ref 7–16)
ANION GAP: 15 MMOL/L (ref 7–16)
ANION GAP: 16 MMOL/L (ref 7–16)
BLOOD BANK SPECIMEN: NORMAL
BUN BLDV-MCNC: 20 MG/DL (ref 6–20)
BUN BLDV-MCNC: 21 MG/DL (ref 6–20)
BUN BLDV-MCNC: 21 MG/DL (ref 6–20)
BUN/CREAT BLD: ABNORMAL (ref 9–20)
CALCIUM IONIZED: 1.09 MMOL/L (ref 1.13–1.33)
CALCIUM IONIZED: 1.1 MMOL/L (ref 1.13–1.33)
CALCIUM SERPL-MCNC: 7.3 MG/DL (ref 8.6–10.4)
CALCIUM SERPL-MCNC: 7.4 MG/DL (ref 8.6–10.4)
CALCIUM SERPL-MCNC: 7.5 MG/DL (ref 8.6–10.4)
CHLORIDE BLD-SCNC: 107 MMOL/L (ref 98–107)
CHLORIDE BLD-SCNC: 108 MMOL/L (ref 98–107)
CHLORIDE BLD-SCNC: 110 MMOL/L (ref 98–107)
CO2: 18 MMOL/L (ref 20–31)
CO2: 19 MMOL/L (ref 20–31)
CO2: 21 MMOL/L (ref 20–31)
CREAT SERPL-MCNC: 1 MG/DL (ref 0.7–1.2)
CREAT SERPL-MCNC: 1.01 MG/DL (ref 0.7–1.2)
CREAT SERPL-MCNC: 1.16 MG/DL (ref 0.7–1.2)
EKG ATRIAL RATE: 249 BPM
EKG P AXIS: 102 DEGREES
EKG Q-T INTERVAL: 440 MS
EKG QRS DURATION: 148 MS
EKG QTC CALCULATION (BAZETT): 526 MS
EKG R AXIS: 71 DEGREES
EKG T AXIS: 82 DEGREES
EKG VENTRICULAR RATE: 86 BPM
FIBRINOGEN: 113 MG/DL (ref 140–420)
FIBRINOGEN: 184 MG/DL (ref 140–420)
FIO2: 40
FIO2: 40
FIO2: 50
FIO2: 50
FIO2: 60
FIO2: 70
FIO2: 70
FIO2: ABNORMAL
GFR AFRICAN AMERICAN: >60 ML/MIN
GFR NON-AFRICAN AMERICAN: >60 ML/MIN
GFR SERPL CREATININE-BSD FRML MDRD: ABNORMAL ML/MIN/{1.73_M2}
GLUCOSE BLD-MCNC: 106 MG/DL (ref 74–100)
GLUCOSE BLD-MCNC: 106 MG/DL (ref 74–100)
GLUCOSE BLD-MCNC: 129 MG/DL (ref 74–100)
GLUCOSE BLD-MCNC: 138 MG/DL (ref 74–100)
GLUCOSE BLD-MCNC: 147 MG/DL (ref 74–100)
GLUCOSE BLD-MCNC: 149 MG/DL (ref 74–100)
GLUCOSE BLD-MCNC: 151 MG/DL (ref 74–100)
GLUCOSE BLD-MCNC: 156 MG/DL (ref 70–99)
GLUCOSE BLD-MCNC: 167 MG/DL (ref 74–100)
GLUCOSE BLD-MCNC: 169 MG/DL (ref 74–100)
GLUCOSE BLD-MCNC: 191 MG/DL (ref 70–99)
GLUCOSE BLD-MCNC: 193 MG/DL (ref 74–100)
GLUCOSE BLD-MCNC: 193 MG/DL (ref 74–100)
GLUCOSE BLD-MCNC: 199 MG/DL (ref 74–100)
GLUCOSE BLD-MCNC: 203 MG/DL (ref 74–100)
GLUCOSE BLD-MCNC: 204 MG/DL (ref 70–99)
GLUCOSE BLD-MCNC: 212 MG/DL (ref 74–100)
GLUCOSE BLD-MCNC: 213 MG/DL (ref 74–100)
GLUCOSE BLD-MCNC: 94 MG/DL (ref 74–100)
HCT VFR BLD CALC: 29.6 % (ref 41–53)
HCT VFR BLD CALC: 32.3 % (ref 41–53)
HCT VFR BLD CALC: 33.5 % (ref 41–53)
HCT VFR BLD CALC: 36.4 % (ref 41–53)
HEMOGLOBIN: 10 G/DL (ref 13.5–17.5)
HEMOGLOBIN: 10.9 G/DL (ref 13.5–17.5)
HEMOGLOBIN: 11.3 G/DL (ref 13.5–17.5)
HEMOGLOBIN: 12.3 G/DL (ref 13.5–17.5)
INR BLD: 1.2
INR BLD: 1.7
MAGNESIUM: 2.2 MG/DL (ref 1.6–2.6)
MAGNESIUM: 2.8 MG/DL (ref 1.6–2.6)
MAGNESIUM: 3.1 MG/DL (ref 1.6–2.6)
MCH RBC QN AUTO: 28.3 PG (ref 26–34)
MCH RBC QN AUTO: 28.7 PG (ref 26–34)
MCH RBC QN AUTO: 28.7 PG (ref 26–34)
MCHC RBC AUTO-ENTMCNC: 33.7 G/DL (ref 31–37)
MCHC RBC AUTO-ENTMCNC: 33.7 G/DL (ref 31–37)
MCHC RBC AUTO-ENTMCNC: 33.8 G/DL (ref 31–37)
MCV RBC AUTO: 84.1 FL (ref 80–100)
MCV RBC AUTO: 84.7 FL (ref 80–100)
MCV RBC AUTO: 85.2 FL (ref 80–100)
MODE: ABNORMAL
NEGATIVE BASE EXCESS, ART: 1 (ref 0–2)
NEGATIVE BASE EXCESS, ART: 3 (ref 0–2)
NEGATIVE BASE EXCESS, ART: 3 (ref 0–2)
NEGATIVE BASE EXCESS, ART: 4 (ref 0–2)
NEGATIVE BASE EXCESS, ART: 6 (ref 0–2)
NEGATIVE BASE EXCESS, ART: ABNORMAL (ref 0–2)
O2 DEVICE/FLOW/%: ABNORMAL
PARTIAL THROMBOPLASTIN TIME: 37.3 SEC (ref 21.3–31.3)
PARTIAL THROMBOPLASTIN TIME: 57.4 SEC (ref 21.3–31.3)
PARTIAL THROMBOPLASTIN TIME: 64.1 SEC (ref 21.3–31.3)
PARTIAL THROMBOPLASTIN TIME: 95.4 SEC (ref 21.3–31.3)
PATIENT TEMP: 37.4
PATIENT TEMP: 37.7
PATIENT TEMP: ABNORMAL
PDW BLD-RTO: 14.1 % (ref 12.5–15.4)
PDW BLD-RTO: 14.4 % (ref 12.5–15.4)
PDW BLD-RTO: 14.8 % (ref 12.5–15.4)
PLATELET # BLD: 110 K/UL (ref 140–450)
PLATELET # BLD: 111 K/UL (ref 140–450)
PLATELET # BLD: 112 K/UL (ref 140–450)
PLATELET # BLD: 119 K/UL (ref 140–450)
PLATELET # BLD: 120 K/UL (ref 140–450)
PLATELET # BLD: 175 K/UL (ref 140–450)
PMV BLD AUTO: 9.6 FL (ref 6–12)
PMV BLD AUTO: 9.7 FL (ref 6–12)
PMV BLD AUTO: 9.8 FL (ref 6–12)
POC ANGLE TEG W HEP: 67.8 DEG (ref 59–74)
POC ANGLE TEG W HEP: 68.2 DEG (ref 59–74)
POC ANGLE TEG: 68.6 DEG (ref 59–74)
POC CHLORIDE: 106 MMOL/L (ref 98–107)
POC CHLORIDE: 107 MMOL/L (ref 98–107)
POC CHLORIDE: 110 MMOL/L (ref 98–107)
POC EPL TEG W/HEP: NORMAL % (ref 0–15)
POC EPL TEG W/HEP: NORMAL % (ref 0–15)
POC EPL TEG: NORMAL % (ref 0–15)
POC HCO3: 18.9 MMOL/L (ref 21–28)
POC HCO3: 19.7 MMOL/L (ref 21–28)
POC HCO3: 20.6 MMOL/L (ref 21–28)
POC HCO3: 20.9 MMOL/L (ref 21–28)
POC HCO3: 21.4 MMOL/L (ref 21–28)
POC HCO3: 21.5 MMOL/L (ref 21–28)
POC HCO3: 22 MMOL/L (ref 21–28)
POC HCO3: 24.3 MMOL/L (ref 21–28)
POC HCO3: 25.9 MMOL/L (ref 21–28)
POC HCO3: 26 MMOL/L (ref 21–28)
POC HCO3: 26.1 MMOL/L (ref 21–28)
POC HCO3: 28 MMOL/L (ref 21–28)
POC HCO3: 28.9 MMOL/L (ref 21–28)
POC HCO3: 29.3 MMOL/L (ref 21–28)
POC HCO3: 30.6 MMOL/L (ref 21–28)
POC HEMATOCRIT: 24 % (ref 41–53)
POC HEMATOCRIT: 24 % (ref 41–53)
POC HEMATOCRIT: 26 % (ref 41–53)
POC HEMATOCRIT: 26 % (ref 41–53)
POC HEMATOCRIT: 27 % (ref 41–53)
POC HEMATOCRIT: 28 % (ref 41–53)
POC HEMATOCRIT: 30 % (ref 41–53)
POC HEMATOCRIT: 30 % (ref 41–53)
POC HEMATOCRIT: 34 % (ref 41–53)
POC HEMATOCRIT: 34 % (ref 41–53)
POC HEMATOCRIT: 36 % (ref 41–53)
POC HEMATOCRIT: 41 % (ref 41–53)
POC HEMOGLOBIN: 10.2 G/DL (ref 13.5–17.5)
POC HEMOGLOBIN: 10.2 G/DL (ref 13.5–17.5)
POC HEMOGLOBIN: 11.5 G/DL (ref 13.5–17.5)
POC HEMOGLOBIN: 11.7 G/DL (ref 13.5–17.5)
POC HEMOGLOBIN: 12.4 G/DL (ref 13.5–17.5)
POC HEMOGLOBIN: 13.9 G/DL (ref 13.5–17.5)
POC HEMOGLOBIN: 8.2 G/DL (ref 13.5–17.5)
POC HEMOGLOBIN: 8.2 G/DL (ref 13.5–17.5)
POC HEMOGLOBIN: 8.9 G/DL (ref 13.5–17.5)
POC HEMOGLOBIN: 8.9 G/DL (ref 13.5–17.5)
POC HEMOGLOBIN: 9.2 G/DL (ref 13.5–17.5)
POC HEMOGLOBIN: 9.5 G/DL (ref 13.5–17.5)
POC IONIZED CALCIUM: 1.04 MMOL/L (ref 1.15–1.33)
POC IONIZED CALCIUM: 1.06 MMOL/L (ref 1.15–1.33)
POC IONIZED CALCIUM: 1.08 MMOL/L (ref 1.15–1.33)
POC IONIZED CALCIUM: 1.14 MMOL/L (ref 1.15–1.33)
POC IONIZED CALCIUM: 1.16 MMOL/L (ref 1.15–1.33)
POC IONIZED CALCIUM: 1.18 MMOL/L (ref 1.15–1.33)
POC IONIZED CALCIUM: 1.2 MMOL/L (ref 1.15–1.33)
POC IONIZED CALCIUM: 1.3 MMOL/L (ref 1.15–1.33)
POC KINETICS TEG W HEP: 1.5 MIN (ref 1–3)
POC KINETICS TEG W HEP: 1.6 MIN (ref 1–3)
POC KINETICS TEG: 1.5 MIN (ref 1–3)
POC LACTIC ACID: 1 MMOL/L (ref 0.56–1.39)
POC LY30(LYSIS) TEG W HEP: NORMAL % (ref 0–8)
POC LY30(LYSIS) TEG W HEP: NORMAL % (ref 0–8)
POC LY30(LYSIS) TEG: NORMAL % (ref 0–8)
POC MA(MAX CLOT) TEG: 66.2 MM (ref 55–74)
POC MAX CLOT TEG W HEP: 62.8 MM (ref 55–74)
POC MAX CLOT TEG W HEP: 72.9 MM (ref 55–74)
POC O2 SATURATION: 100 % (ref 94–98)
POC O2 SATURATION: 93 % (ref 94–98)
POC O2 SATURATION: 93 % (ref 94–98)
POC O2 SATURATION: 95 % (ref 94–98)
POC O2 SATURATION: 96 % (ref 94–98)
POC O2 SATURATION: 97 % (ref 94–98)
POC O2 SATURATION: 97 % (ref 94–98)
POC O2 SATURATION: 98 % (ref 94–98)
POC PCO2 TEMP: 36 MM HG
POC PCO2 TEMP: 37 MM HG
POC PCO2 TEMP: ABNORMAL MM HG
POC PCO2: 35.7 MM HG (ref 35–48)
POC PCO2: 36.2 MM HG (ref 35–48)
POC PCO2: 37.4 MM HG (ref 35–48)
POC PCO2: 41.2 MM HG (ref 35–48)
POC PCO2: 41.4 MM HG (ref 35–48)
POC PCO2: 42.4 MM HG (ref 35–48)
POC PCO2: 43.1 MM HG (ref 35–48)
POC PCO2: 47.7 MM HG (ref 35–48)
POC PCO2: 51.1 MM HG (ref 35–48)
POC PCO2: 51.5 MM HG (ref 35–48)
POC PCO2: 51.9 MM HG (ref 35–48)
POC PCO2: 52.8 MM HG (ref 35–48)
POC PCO2: 55.9 MM HG (ref 35–48)
POC PH TEMP: 7.36
POC PH TEMP: 7.37
POC PH TEMP: ABNORMAL
POC PH: 7.23 (ref 7.35–7.45)
POC PH: 7.28 (ref 7.35–7.45)
POC PH: 7.3 (ref 7.35–7.45)
POC PH: 7.32 (ref 7.35–7.45)
POC PH: 7.32 (ref 7.35–7.45)
POC PH: 7.33 (ref 7.35–7.45)
POC PH: 7.34 (ref 7.35–7.45)
POC PH: 7.35 (ref 7.35–7.45)
POC PH: 7.36 (ref 7.35–7.45)
POC PH: 7.37 (ref 7.35–7.45)
POC PH: 7.38 (ref 7.35–7.45)
POC PH: 7.38 (ref 7.35–7.45)
POC PH: 7.39 (ref 7.35–7.45)
POC PH: 7.44 (ref 7.35–7.45)
POC PH: 7.45 (ref 7.35–7.45)
POC PO2 TEMP: 107 MM HG
POC PO2 TEMP: 92 MM HG
POC PO2 TEMP: ABNORMAL MM HG
POC PO2: 101.8 MM HG (ref 83–108)
POC PO2: 103 MM HG (ref 83–108)
POC PO2: 117.7 MM HG (ref 83–108)
POC PO2: 126.4 MM HG (ref 83–108)
POC PO2: 306.4 MM HG (ref 83–108)
POC PO2: 361.4 MM HG (ref 83–108)
POC PO2: 366.4 MM HG (ref 83–108)
POC PO2: 392.7 MM HG (ref 83–108)
POC PO2: 434.2 MM HG (ref 83–108)
POC PO2: 546.1 MM HG (ref 83–108)
POC PO2: 70.1 MM HG (ref 83–108)
POC PO2: 78.2 MM HG (ref 83–108)
POC PO2: 82.2 MM HG (ref 83–108)
POC PO2: 86.9 MM HG (ref 83–108)
POC PO2: 90.1 MM HG (ref 83–108)
POC POTASSIUM: 3.1 MMOL/L (ref 3.5–4.5)
POC POTASSIUM: 3.2 MMOL/L (ref 3.5–4.5)
POC POTASSIUM: 3.2 MMOL/L (ref 3.5–4.5)
POC POTASSIUM: 3.4 MMOL/L (ref 3.5–4.5)
POC POTASSIUM: 3.5 MMOL/L (ref 3.5–4.5)
POC POTASSIUM: 3.7 MMOL/L (ref 3.5–4.5)
POC POTASSIUM: 3.8 MMOL/L (ref 3.5–4.5)
POC POTASSIUM: 3.8 MMOL/L (ref 3.5–4.5)
POC POTASSIUM: 4.3 MMOL/L (ref 3.5–4.5)
POC POTASSIUM: 4.9 MMOL/L (ref 3.5–4.5)
POC POTASSIUM: 5.1 MMOL/L (ref 3.5–4.5)
POC REACTION TIME TEG W HEP: 6.2 MIN (ref 4–9)
POC REACTION TIME TEG W HEP: 8.2 MIN (ref 4–9)
POC REACTION TIME TEG: 6.3 MIN (ref 4–9)
POC SODIUM: 142 MMOL/L (ref 138–146)
POC SODIUM: 143 MMOL/L (ref 138–146)
POC SODIUM: 145 MMOL/L (ref 138–146)
POSITIVE BASE EXCESS, ART: 0 (ref 0–3)
POSITIVE BASE EXCESS, ART: 1 (ref 0–3)
POSITIVE BASE EXCESS, ART: 2 (ref 0–3)
POSITIVE BASE EXCESS, ART: 4 (ref 0–3)
POSITIVE BASE EXCESS, ART: 5 (ref 0–3)
POSITIVE BASE EXCESS, ART: 5 (ref 0–3)
POSITIVE BASE EXCESS, ART: ABNORMAL (ref 0–3)
POTASSIUM SERPL-SCNC: 3.5 MMOL/L (ref 3.7–5.3)
POTASSIUM SERPL-SCNC: 3.6 MMOL/L (ref 3.7–5.3)
POTASSIUM SERPL-SCNC: 4 MMOL/L (ref 3.7–5.3)
PROTHROMBIN TIME: 12.6 SEC (ref 9.4–12.6)
PROTHROMBIN TIME: 17.9 SEC (ref 9.4–12.6)
RBC # BLD: 3.52 M/UL (ref 4.5–5.9)
RBC # BLD: 3.93 M/UL (ref 4.5–5.9)
RBC # BLD: 4.3 M/UL (ref 4.5–5.9)
SAMPLE SITE: ABNORMAL
SODIUM BLD-SCNC: 144 MMOL/L (ref 135–144)
SODIUM BLD-SCNC: 144 MMOL/L (ref 135–144)
SODIUM BLD-SCNC: 145 MMOL/L (ref 135–144)
TCO2 (CALC), ART: 20 MMOL/L (ref 22–29)
TCO2 (CALC), ART: 21 MMOL/L (ref 22–29)
TCO2 (CALC), ART: 22 MMOL/L (ref 22–29)
TCO2 (CALC), ART: 22 MMOL/L (ref 22–29)
TCO2 (CALC), ART: 23 MMOL/L (ref 22–29)
TCO2 (CALC), ART: 26 MMOL/L (ref 22–29)
TCO2 (CALC), ART: 27 MMOL/L (ref 22–29)
TCO2 (CALC), ART: 28 MMOL/L (ref 22–29)
TCO2 (CALC), ART: 28 MMOL/L (ref 22–29)
TCO2 (CALC), ART: 29 MMOL/L (ref 22–29)
TCO2 (CALC), ART: 31 MMOL/L (ref 22–29)
TCO2 (CALC), ART: 31 MMOL/L (ref 22–29)
TCO2 (CALC), ART: 32 MMOL/L (ref 22–29)
TEG COMMENT: NORMAL
WBC # BLD: 24.3 K/UL (ref 3.5–11)
WBC # BLD: 26.4 K/UL (ref 3.5–11)
WBC # BLD: 30.6 K/UL (ref 3.5–11)

## 2017-10-23 PROCEDURE — 84295 ASSAY OF SERUM SODIUM: CPT

## 2017-10-23 PROCEDURE — 02RG0JZ REPLACEMENT OF MITRAL VALVE WITH SYNTHETIC SUBSTITUTE, OPEN APPROACH: ICD-10-PCS | Performed by: THORACIC SURGERY (CARDIOTHORACIC VASCULAR SURGERY)

## 2017-10-23 PROCEDURE — 84132 ASSAY OF SERUM POTASSIUM: CPT

## 2017-10-23 PROCEDURE — 6360000002 HC RX W HCPCS: Performed by: NURSE ANESTHETIST, CERTIFIED REGISTERED

## 2017-10-23 PROCEDURE — 3600000008 HC SURGERY OHS BASE: Performed by: THORACIC SURGERY (CARDIOTHORACIC VASCULAR SURGERY)

## 2017-10-23 PROCEDURE — 3700000000 HC ANESTHESIA ATTENDED CARE: Performed by: THORACIC SURGERY (CARDIOTHORACIC VASCULAR SURGERY)

## 2017-10-23 PROCEDURE — 85610 PROTHROMBIN TIME: CPT

## 2017-10-23 PROCEDURE — 85730 THROMBOPLASTIN TIME PARTIAL: CPT

## 2017-10-23 PROCEDURE — 6360000002 HC RX W HCPCS: Performed by: THORACIC SURGERY (CARDIOTHORACIC VASCULAR SURGERY)

## 2017-10-23 PROCEDURE — 02580ZZ DESTRUCTION OF CONDUCTION MECHANISM, OPEN APPROACH: ICD-10-PCS | Performed by: THORACIC SURGERY (CARDIOTHORACIC VASCULAR SURGERY)

## 2017-10-23 PROCEDURE — 82803 BLOOD GASES ANY COMBINATION: CPT

## 2017-10-23 PROCEDURE — 85049 AUTOMATED PLATELET COUNT: CPT

## 2017-10-23 PROCEDURE — 82947 ASSAY GLUCOSE BLOOD QUANT: CPT

## 2017-10-23 PROCEDURE — 85390 FIBRINOLYSINS SCREEN I&R: CPT

## 2017-10-23 PROCEDURE — 94762 N-INVAS EAR/PLS OXIMTRY CONT: CPT

## 2017-10-23 PROCEDURE — C1729 CATH, DRAINAGE: HCPCS | Performed by: THORACIC SURGERY (CARDIOTHORACIC VASCULAR SURGERY)

## 2017-10-23 PROCEDURE — 2500000003 HC RX 250 WO HCPCS

## 2017-10-23 PROCEDURE — 7100000011 HC PHASE II RECOVERY - ADDTL 15 MIN

## 2017-10-23 PROCEDURE — 83605 ASSAY OF LACTIC ACID: CPT

## 2017-10-23 PROCEDURE — 82435 ASSAY OF BLOOD CHLORIDE: CPT

## 2017-10-23 PROCEDURE — 33430 REPLACEMENT OF MITRAL VALVE: CPT | Performed by: THORACIC SURGERY (CARDIOTHORACIC VASCULAR SURGERY)

## 2017-10-23 PROCEDURE — 6370000000 HC RX 637 (ALT 250 FOR IP): Performed by: ANESTHESIOLOGY

## 2017-10-23 PROCEDURE — 2580000003 HC RX 258: Performed by: NURSE PRACTITIONER

## 2017-10-23 PROCEDURE — 82330 ASSAY OF CALCIUM: CPT

## 2017-10-23 PROCEDURE — 33530 CORONARY ARTERY BYPASS/REOP: CPT | Performed by: THORACIC SURGERY (CARDIOTHORACIC VASCULAR SURGERY)

## 2017-10-23 PROCEDURE — 2100000001 HC CVICU R&B

## 2017-10-23 PROCEDURE — 85014 HEMATOCRIT: CPT

## 2017-10-23 PROCEDURE — 85384 FIBRINOGEN ACTIVITY: CPT

## 2017-10-23 PROCEDURE — 2500000003 HC RX 250 WO HCPCS: Performed by: NURSE ANESTHETIST, CERTIFIED REGISTERED

## 2017-10-23 PROCEDURE — 85027 COMPLETE CBC AUTOMATED: CPT

## 2017-10-23 PROCEDURE — 80048 BASIC METABOLIC PNL TOTAL CA: CPT

## 2017-10-23 PROCEDURE — 83735 ASSAY OF MAGNESIUM: CPT

## 2017-10-23 PROCEDURE — 36415 COLL VENOUS BLD VENIPUNCTURE: CPT

## 2017-10-23 PROCEDURE — 87086 URINE CULTURE/COLONY COUNT: CPT

## 2017-10-23 PROCEDURE — 6370000000 HC RX 637 (ALT 250 FOR IP): Performed by: NURSE ANESTHETIST, CERTIFIED REGISTERED

## 2017-10-23 PROCEDURE — 3600000018 HC SURGERY OHS ADDTL 15MIN: Performed by: THORACIC SURGERY (CARDIOTHORACIC VASCULAR SURGERY)

## 2017-10-23 PROCEDURE — 7100000000 HC PACU RECOVERY - FIRST 15 MIN

## 2017-10-23 PROCEDURE — 2580000003 HC RX 258: Performed by: THORACIC SURGERY (CARDIOTHORACIC VASCULAR SURGERY)

## 2017-10-23 PROCEDURE — 3700000001 HC ADD 15 MINUTES (ANESTHESIA): Performed by: THORACIC SURGERY (CARDIOTHORACIC VASCULAR SURGERY)

## 2017-10-23 PROCEDURE — 2720000010 HC SURG SUPPLY STERILE: Performed by: THORACIC SURGERY (CARDIOTHORACIC VASCULAR SURGERY)

## 2017-10-23 PROCEDURE — 2780000006 HC MISC HEART VALVE: Performed by: THORACIC SURGERY (CARDIOTHORACIC VASCULAR SURGERY)

## 2017-10-23 PROCEDURE — 5A1221Z PERFORMANCE OF CARDIAC OUTPUT, CONTINUOUS: ICD-10-PCS | Performed by: THORACIC SURGERY (CARDIOTHORACIC VASCULAR SURGERY)

## 2017-10-23 PROCEDURE — 71010 XR CHEST PORTABLE: CPT

## 2017-10-23 PROCEDURE — 6370000000 HC RX 637 (ALT 250 FOR IP): Performed by: THORACIC SURGERY (CARDIOTHORACIC VASCULAR SURGERY)

## 2017-10-23 PROCEDURE — B24BZZ4 ULTRASONOGRAPHY OF HEART WITH AORTA, TRANSESOPHAGEAL: ICD-10-PCS | Performed by: ANESTHESIOLOGY

## 2017-10-23 PROCEDURE — 94770 HC ETCO2 MONITOR DAILY: CPT

## 2017-10-23 PROCEDURE — 85576 BLOOD PLATELET AGGREGATION: CPT

## 2017-10-23 PROCEDURE — 6360000002 HC RX W HCPCS: Performed by: NURSE PRACTITIONER

## 2017-10-23 PROCEDURE — 2580000003 HC RX 258: Performed by: NURSE ANESTHETIST, CERTIFIED REGISTERED

## 2017-10-23 PROCEDURE — 88305 TISSUE EXAM BY PATHOLOGIST: CPT

## 2017-10-23 PROCEDURE — 85347 COAGULATION TIME ACTIVATED: CPT

## 2017-10-23 PROCEDURE — P9041 ALBUMIN (HUMAN),5%, 50ML: HCPCS | Performed by: THORACIC SURGERY (CARDIOTHORACIC VASCULAR SURGERY)

## 2017-10-23 PROCEDURE — 99232 SBSQ HOSP IP/OBS MODERATE 35: CPT | Performed by: FAMILY MEDICINE

## 2017-10-23 PROCEDURE — 6360000002 HC RX W HCPCS: Performed by: FAMILY MEDICINE

## 2017-10-23 PROCEDURE — 85018 HEMOGLOBIN: CPT

## 2017-10-23 PROCEDURE — 94002 VENT MGMT INPAT INIT DAY: CPT

## 2017-10-23 PROCEDURE — 2500000003 HC RX 250 WO HCPCS: Performed by: THORACIC SURGERY (CARDIOTHORACIC VASCULAR SURGERY)

## 2017-10-23 PROCEDURE — 6370000000 HC RX 637 (ALT 250 FOR IP): Performed by: NURSE PRACTITIONER

## 2017-10-23 PROCEDURE — 33259 ABLATE ATRIA W/BYPASS ADD-ON: CPT | Performed by: THORACIC SURGERY (CARDIOTHORACIC VASCULAR SURGERY)

## 2017-10-23 DEVICE — IMPLANTABLE DEVICE: Type: IMPLANTABLE DEVICE | Site: HEART | Status: FUNCTIONAL

## 2017-10-23 RX ORDER — AMIODARONE HYDROCHLORIDE 200 MG/1
200 TABLET ORAL 2 TIMES DAILY
Status: DISCONTINUED | OUTPATIENT
Start: 2017-10-23 | End: 2017-10-24

## 2017-10-23 RX ORDER — NICOTINE POLACRILEX 4 MG
15 LOZENGE BUCCAL PRN
Status: DISCONTINUED | OUTPATIENT
Start: 2017-10-23 | End: 2017-10-30 | Stop reason: HOSPADM

## 2017-10-23 RX ORDER — MEPERIDINE HYDROCHLORIDE 50 MG/ML
25 INJECTION INTRAMUSCULAR; INTRAVENOUS; SUBCUTANEOUS
Status: ACTIVE | OUTPATIENT
Start: 2017-10-23 | End: 2017-10-23

## 2017-10-23 RX ORDER — SODIUM CHLORIDE, SODIUM LACTATE, POTASSIUM CHLORIDE, CALCIUM CHLORIDE 600; 310; 30; 20 MG/100ML; MG/100ML; MG/100ML; MG/100ML
INJECTION, SOLUTION INTRAVENOUS CONTINUOUS PRN
Status: DISCONTINUED | OUTPATIENT
Start: 2017-10-23 | End: 2017-10-23 | Stop reason: SDUPTHER

## 2017-10-23 RX ORDER — ALBUMIN, HUMAN INJ 5% 5 %
25 SOLUTION INTRAVENOUS ONCE
Status: COMPLETED | OUTPATIENT
Start: 2017-10-24 | End: 2017-10-24

## 2017-10-23 RX ORDER — ACETAMINOPHEN 325 MG/1
650 TABLET ORAL EVERY 4 HOURS PRN
Status: DISCONTINUED | OUTPATIENT
Start: 2017-10-23 | End: 2017-10-30 | Stop reason: HOSPADM

## 2017-10-23 RX ORDER — PROPOFOL 10 MG/ML
INJECTION, EMULSION INTRAVENOUS PRN
Status: DISCONTINUED | OUTPATIENT
Start: 2017-10-23 | End: 2017-10-23 | Stop reason: SDUPTHER

## 2017-10-23 RX ORDER — DIPHENHYDRAMINE HCL 25 MG
25 TABLET ORAL NIGHTLY PRN
Status: DISCONTINUED | OUTPATIENT
Start: 2017-10-24 | End: 2017-10-30 | Stop reason: HOSPADM

## 2017-10-23 RX ORDER — MILRINONE LACTATE 0.2 MG/ML
INJECTION, SOLUTION INTRAVENOUS CONTINUOUS PRN
Status: DISCONTINUED | OUTPATIENT
Start: 2017-10-23 | End: 2017-10-23 | Stop reason: SDUPTHER

## 2017-10-23 RX ORDER — METOCLOPRAMIDE HYDROCHLORIDE 5 MG/ML
10 INJECTION INTRAMUSCULAR; INTRAVENOUS EVERY 6 HOURS PRN
Status: DISCONTINUED | OUTPATIENT
Start: 2017-10-23 | End: 2017-10-24

## 2017-10-23 RX ORDER — SODIUM CHLORIDE 0.9 % (FLUSH) 0.9 %
10 SYRINGE (ML) INJECTION EVERY 12 HOURS SCHEDULED
Status: DISCONTINUED | OUTPATIENT
Start: 2017-10-23 | End: 2017-10-23

## 2017-10-23 RX ORDER — VANCOMYCIN HYDROCHLORIDE 1 G/200ML
1000 INJECTION, SOLUTION INTRAVENOUS
Status: COMPLETED | OUTPATIENT
Start: 2017-10-23 | End: 2017-10-23

## 2017-10-23 RX ORDER — CHLORHEXIDINE GLUCONATE 0.12 MG/ML
15 RINSE ORAL 2 TIMES DAILY
Status: DISCONTINUED | OUTPATIENT
Start: 2017-10-23 | End: 2017-10-25

## 2017-10-23 RX ORDER — KETOROLAC TROMETHAMINE 15 MG/ML
15 INJECTION, SOLUTION INTRAMUSCULAR; INTRAVENOUS EVERY 6 HOURS
Status: DISPENSED | OUTPATIENT
Start: 2017-10-23 | End: 2017-10-25

## 2017-10-23 RX ORDER — OXYCODONE HYDROCHLORIDE AND ACETAMINOPHEN 5; 325 MG/1; MG/1
2 TABLET ORAL EVERY 4 HOURS PRN
Status: DISCONTINUED | OUTPATIENT
Start: 2017-10-23 | End: 2017-10-30 | Stop reason: HOSPADM

## 2017-10-23 RX ORDER — DEXTROSE MONOHYDRATE 25 G/50ML
12.5 INJECTION, SOLUTION INTRAVENOUS PRN
Status: DISCONTINUED | OUTPATIENT
Start: 2017-10-23 | End: 2017-10-30 | Stop reason: HOSPADM

## 2017-10-23 RX ORDER — ALBUMIN, HUMAN INJ 5% 5 %
SOLUTION INTRAVENOUS
Status: COMPLETED
Start: 2017-10-23 | End: 2017-10-24

## 2017-10-23 RX ORDER — MAGNESIUM SULFATE 1 G/100ML
1 INJECTION INTRAVENOUS PRN
Status: DISCONTINUED | OUTPATIENT
Start: 2017-10-23 | End: 2017-10-30 | Stop reason: HOSPADM

## 2017-10-23 RX ORDER — CALCIUM CHLORIDE 100 MG/ML
INJECTION INTRAVENOUS; INTRAVENTRICULAR PRN
Status: DISCONTINUED | OUTPATIENT
Start: 2017-10-23 | End: 2017-10-23 | Stop reason: SDUPTHER

## 2017-10-23 RX ORDER — DIGOXIN 0.25 MG/ML
250 INJECTION INTRAMUSCULAR; INTRAVENOUS DAILY
Status: DISCONTINUED | OUTPATIENT
Start: 2017-10-23 | End: 2017-10-24

## 2017-10-23 RX ORDER — MIDAZOLAM HYDROCHLORIDE 1 MG/ML
INJECTION INTRAMUSCULAR; INTRAVENOUS PRN
Status: DISCONTINUED | OUTPATIENT
Start: 2017-10-23 | End: 2017-10-23 | Stop reason: SDUPTHER

## 2017-10-23 RX ORDER — EPHEDRINE SULFATE 50 MG/ML
INJECTION, SOLUTION INTRAVENOUS PRN
Status: DISCONTINUED | OUTPATIENT
Start: 2017-10-23 | End: 2017-10-23 | Stop reason: SDUPTHER

## 2017-10-23 RX ORDER — ALBUMIN, HUMAN INJ 5% 5 %
25 SOLUTION INTRAVENOUS ONCE
Status: COMPLETED | OUTPATIENT
Start: 2017-10-23 | End: 2017-10-23

## 2017-10-23 RX ORDER — LORAZEPAM 1 MG/1
2 TABLET ORAL
Status: COMPLETED | OUTPATIENT
Start: 2017-10-23 | End: 2017-10-23

## 2017-10-23 RX ORDER — POTASSIUM CHLORIDE 29.8 MG/ML
20 INJECTION INTRAVENOUS PRN
Status: DISCONTINUED | OUTPATIENT
Start: 2017-10-23 | End: 2017-10-30 | Stop reason: HOSPADM

## 2017-10-23 RX ORDER — SODIUM CHLORIDE 9 MG/ML
INJECTION, SOLUTION INTRAVENOUS CONTINUOUS
Status: DISCONTINUED | OUTPATIENT
Start: 2017-10-23 | End: 2017-10-25

## 2017-10-23 RX ORDER — FENTANYL CITRATE 50 UG/ML
INJECTION, SOLUTION INTRAMUSCULAR; INTRAVENOUS PRN
Status: DISCONTINUED | OUTPATIENT
Start: 2017-10-23 | End: 2017-10-23 | Stop reason: SDUPTHER

## 2017-10-23 RX ORDER — SODIUM CHLORIDE 0.9 % (FLUSH) 0.9 %
10 SYRINGE (ML) INJECTION PRN
Status: DISCONTINUED | OUTPATIENT
Start: 2017-10-23 | End: 2017-10-23

## 2017-10-23 RX ORDER — FENTANYL CITRATE 50 UG/ML
50 INJECTION, SOLUTION INTRAMUSCULAR; INTRAVENOUS
Status: DISCONTINUED | OUTPATIENT
Start: 2017-10-23 | End: 2017-10-30 | Stop reason: HOSPADM

## 2017-10-23 RX ORDER — SODIUM CHLORIDE 0.9 % (FLUSH) 0.9 %
10 SYRINGE (ML) INJECTION PRN
Status: DISCONTINUED | OUTPATIENT
Start: 2017-10-23 | End: 2017-10-30 | Stop reason: HOSPADM

## 2017-10-23 RX ORDER — LIDOCAINE HYDROCHLORIDE 10 MG/ML
INJECTION, SOLUTION EPIDURAL; INFILTRATION; INTRACAUDAL; PERINEURAL PRN
Status: DISCONTINUED | OUTPATIENT
Start: 2017-10-23 | End: 2017-10-23 | Stop reason: SDUPTHER

## 2017-10-23 RX ORDER — PROTAMINE SULFATE 10 MG/ML
INJECTION, SOLUTION INTRAVENOUS PRN
Status: DISCONTINUED | OUTPATIENT
Start: 2017-10-23 | End: 2017-10-23 | Stop reason: SDUPTHER

## 2017-10-23 RX ORDER — HETASTARCH 6 G/100ML
500 INJECTION, SOLUTION INTRAVENOUS CONTINUOUS PRN
Status: DISCONTINUED | OUTPATIENT
Start: 2017-10-23 | End: 2017-10-30 | Stop reason: HOSPADM

## 2017-10-23 RX ORDER — ONDANSETRON 2 MG/ML
4 INJECTION INTRAMUSCULAR; INTRAVENOUS EVERY 8 HOURS PRN
Status: DISCONTINUED | OUTPATIENT
Start: 2017-10-23 | End: 2017-10-30 | Stop reason: HOSPADM

## 2017-10-23 RX ORDER — HEPARIN SODIUM 1000 [USP'U]/ML
INJECTION, SOLUTION INTRAVENOUS; SUBCUTANEOUS PRN
Status: DISCONTINUED | OUTPATIENT
Start: 2017-10-23 | End: 2017-10-23 | Stop reason: SDUPTHER

## 2017-10-23 RX ORDER — KETOROLAC TROMETHAMINE 30 MG/ML
30 INJECTION, SOLUTION INTRAMUSCULAR; INTRAVENOUS ONCE
Status: COMPLETED | OUTPATIENT
Start: 2017-10-23 | End: 2017-10-23

## 2017-10-23 RX ORDER — ASPIRIN 81 MG/1
81 TABLET ORAL ONCE
Status: COMPLETED | OUTPATIENT
Start: 2017-10-23 | End: 2017-10-23

## 2017-10-23 RX ORDER — PROPOFOL 10 MG/ML
10 INJECTION, EMULSION INTRAVENOUS
Status: DISCONTINUED | OUTPATIENT
Start: 2017-10-23 | End: 2017-10-24

## 2017-10-23 RX ORDER — SODIUM CHLORIDE 9 MG/ML
INJECTION, SOLUTION INTRAVENOUS CONTINUOUS
Status: DISCONTINUED | OUTPATIENT
Start: 2017-10-23 | End: 2017-10-23

## 2017-10-23 RX ORDER — ROCURONIUM BROMIDE 10 MG/ML
INJECTION, SOLUTION INTRAVENOUS PRN
Status: DISCONTINUED | OUTPATIENT
Start: 2017-10-23 | End: 2017-10-23 | Stop reason: SDUPTHER

## 2017-10-23 RX ORDER — DEXTROSE MONOHYDRATE 50 MG/ML
100 INJECTION, SOLUTION INTRAVENOUS PRN
Status: DISCONTINUED | OUTPATIENT
Start: 2017-10-23 | End: 2017-10-30 | Stop reason: HOSPADM

## 2017-10-23 RX ORDER — SODIUM CHLORIDE 9 MG/ML
INJECTION, SOLUTION INTRAVENOUS CONTINUOUS PRN
Status: DISCONTINUED | OUTPATIENT
Start: 2017-10-23 | End: 2017-10-23 | Stop reason: SDUPTHER

## 2017-10-23 RX ORDER — M-VIT,TX,IRON,MINS/CALC/FOLIC 27MG-0.4MG
1 TABLET ORAL
Status: DISCONTINUED | OUTPATIENT
Start: 2017-10-24 | End: 2017-10-30 | Stop reason: HOSPADM

## 2017-10-23 RX ORDER — ALBUMIN (HUMAN) 12.5 G/50ML
SOLUTION INTRAVENOUS
Status: DISPENSED
Start: 2017-10-23 | End: 2017-10-24

## 2017-10-23 RX ORDER — PROPOFOL 10 MG/ML
INJECTION, EMULSION INTRAVENOUS CONTINUOUS PRN
Status: DISCONTINUED | OUTPATIENT
Start: 2017-10-23 | End: 2017-10-23 | Stop reason: SDUPTHER

## 2017-10-23 RX ORDER — SIMVASTATIN 20 MG
20 TABLET ORAL NIGHTLY
Status: DISCONTINUED | OUTPATIENT
Start: 2017-10-24 | End: 2017-10-30 | Stop reason: HOSPADM

## 2017-10-23 RX ORDER — HYDRALAZINE HYDROCHLORIDE 20 MG/ML
5 INJECTION INTRAMUSCULAR; INTRAVENOUS EVERY 5 MIN PRN
Status: DISCONTINUED | OUTPATIENT
Start: 2017-10-23 | End: 2017-10-30 | Stop reason: HOSPADM

## 2017-10-23 RX ORDER — ASPIRIN 300 MG/1
150 SUPPOSITORY RECTAL ONCE
Status: COMPLETED | OUTPATIENT
Start: 2017-10-23 | End: 2017-10-23

## 2017-10-23 RX ORDER — ASPIRIN 81 MG/1
81 TABLET ORAL DAILY
Status: DISCONTINUED | OUTPATIENT
Start: 2017-10-23 | End: 2017-10-30 | Stop reason: HOSPADM

## 2017-10-23 RX ADMIN — FENTANYL CITRATE 50 MCG: 50 INJECTION INTRAMUSCULAR; INTRAVENOUS at 18:55

## 2017-10-23 RX ADMIN — ROCURONIUM BROMIDE 25 MG: 10 INJECTION INTRAVENOUS at 09:14

## 2017-10-23 RX ADMIN — SODIUM CHLORIDE: 9 INJECTION, SOLUTION INTRAVENOUS at 01:38

## 2017-10-23 RX ADMIN — TRANEXAMIC ACID 1 G: 100 INJECTION, SOLUTION INTRAVENOUS at 08:26

## 2017-10-23 RX ADMIN — MIDAZOLAM HYDROCHLORIDE 2 MG: 1 INJECTION, SOLUTION INTRAMUSCULAR; INTRAVENOUS at 08:38

## 2017-10-23 RX ADMIN — PROPOFOL 15 MCG/KG/MIN: 10 INJECTION, EMULSION INTRAVENOUS at 13:37

## 2017-10-23 RX ADMIN — SODIUM BICARBONATE 50 MEQ: 84 INJECTION INTRAVENOUS at 20:00

## 2017-10-23 RX ADMIN — MILRINONE LACTATE 50 MCG/KG/MIN: 0.2 INJECTION, SOLUTION INTRAVENOUS at 11:57

## 2017-10-23 RX ADMIN — PHENYLEPHRINE HYDROCHLORIDE 100 MCG: 10 INJECTION INTRAMUSCULAR; INTRAVENOUS; SUBCUTANEOUS at 09:40

## 2017-10-23 RX ADMIN — SODIUM CHLORIDE: 900 INJECTION, SOLUTION INTRAVENOUS at 09:40

## 2017-10-23 RX ADMIN — PHENYLEPHRINE HYDROCHLORIDE 100 MCG: 10 INJECTION INTRAMUSCULAR; INTRAVENOUS; SUBCUTANEOUS at 08:10

## 2017-10-23 RX ADMIN — POTASSIUM CHLORIDE 20 MEQ: 400 INJECTION, SOLUTION INTRAVENOUS at 18:03

## 2017-10-23 RX ADMIN — SODIUM CHLORIDE, POTASSIUM CHLORIDE, SODIUM LACTATE AND CALCIUM CHLORIDE: 600; 310; 30; 20 INJECTION, SOLUTION INTRAVENOUS at 08:15

## 2017-10-23 RX ADMIN — PROPOFOL 15 MCG/KG/MIN: 10 INJECTION, EMULSION INTRAVENOUS at 17:46

## 2017-10-23 RX ADMIN — EPHEDRINE SULFATE 15 MG: 50 INJECTION, SOLUTION INTRAMUSCULAR; INTRAVENOUS; SUBCUTANEOUS at 08:30

## 2017-10-23 RX ADMIN — ASPIRIN 150 MG: 300 SUPPOSITORY RECTAL at 15:47

## 2017-10-23 RX ADMIN — PHENYLEPHRINE HYDROCHLORIDE 100 MCG: 10 INJECTION INTRAMUSCULAR; INTRAVENOUS; SUBCUTANEOUS at 08:05

## 2017-10-23 RX ADMIN — VANCOMYCIN HYDROCHLORIDE 1 G: 1 INJECTION, SOLUTION INTRAVENOUS at 08:21

## 2017-10-23 RX ADMIN — HEPARIN SODIUM 30000 UNITS: 1000 INJECTION, SOLUTION INTRAVENOUS; SUBCUTANEOUS at 12:14

## 2017-10-23 RX ADMIN — CALCIUM CHLORIDE 0.5 G: 100 INJECTION, SOLUTION INTRAVENOUS at 13:22

## 2017-10-23 RX ADMIN — DEXMEDETOMIDINE HYDROCHLORIDE 0.4 MCG/KG/HR: 100 INJECTION, SOLUTION INTRAVENOUS at 20:17

## 2017-10-23 RX ADMIN — POTASSIUM CHLORIDE 10 MEQ: 7.46 INJECTION, SOLUTION INTRAVENOUS at 13:36

## 2017-10-23 RX ADMIN — ROCURONIUM BROMIDE 25 MG: 10 INJECTION INTRAVENOUS at 12:23

## 2017-10-23 RX ADMIN — SODIUM BICARBONATE 75 MEQ: 84 INJECTION, SOLUTION INTRAVENOUS at 14:24

## 2017-10-23 RX ADMIN — EPHEDRINE SULFATE 15 MG: 50 INJECTION, SOLUTION INTRAMUSCULAR; INTRAVENOUS; SUBCUTANEOUS at 08:10

## 2017-10-23 RX ADMIN — SODIUM CHLORIDE 2 UNITS/HR: 9 INJECTION, SOLUTION INTRAVENOUS at 11:36

## 2017-10-23 RX ADMIN — POTASSIUM CHLORIDE 20 MEQ: 400 INJECTION, SOLUTION INTRAVENOUS at 19:01

## 2017-10-23 RX ADMIN — PHENYLEPHRINE HYDROCHLORIDE 25 MCG: 10 INJECTION INTRAMUSCULAR; INTRAVENOUS; SUBCUTANEOUS at 09:35

## 2017-10-23 RX ADMIN — MUPIROCIN: 20 OINTMENT TOPICAL at 07:01

## 2017-10-23 RX ADMIN — HEPARIN SODIUM 27000 UNITS: 1000 INJECTION, SOLUTION INTRAVENOUS; SUBCUTANEOUS at 09:31

## 2017-10-23 RX ADMIN — ROCURONIUM BROMIDE 50 MG: 10 INJECTION INTRAVENOUS at 07:58

## 2017-10-23 RX ADMIN — KETOROLAC TROMETHAMINE 30 MG: 30 INJECTION, SOLUTION INTRAMUSCULAR at 15:47

## 2017-10-23 RX ADMIN — PHENYLEPHRINE HYDROCHLORIDE 100 MCG: 10 INJECTION INTRAMUSCULAR; INTRAVENOUS; SUBCUTANEOUS at 08:25

## 2017-10-23 RX ADMIN — PROPOFOL 140 MG: 10 INJECTION, EMULSION INTRAVENOUS at 07:58

## 2017-10-23 RX ADMIN — ROCURONIUM BROMIDE 25 MG: 10 INJECTION INTRAVENOUS at 11:20

## 2017-10-23 RX ADMIN — ASPIRIN 81 MG: 81 TABLET, COATED ORAL at 07:01

## 2017-10-23 RX ADMIN — LIDOCAINE HYDROCHLORIDE 35 MG: 10 INJECTION, SOLUTION EPIDURAL; INFILTRATION; INTRACAUDAL; PERINEURAL at 07:58

## 2017-10-23 RX ADMIN — SODIUM CHLORIDE: 900 INJECTION, SOLUTION INTRAVENOUS at 09:01

## 2017-10-23 RX ADMIN — FENTANYL CITRATE 250 MCG: 50 INJECTION INTRAMUSCULAR; INTRAVENOUS at 07:58

## 2017-10-23 RX ADMIN — ROCURONIUM BROMIDE 25 MG: 10 INJECTION INTRAVENOUS at 10:34

## 2017-10-23 RX ADMIN — SODIUM CHLORIDE: 900 INJECTION, SOLUTION INTRAVENOUS at 13:11

## 2017-10-23 RX ADMIN — ROCURONIUM BROMIDE 25 MG: 10 INJECTION INTRAVENOUS at 12:48

## 2017-10-23 RX ADMIN — SODIUM BICARBONATE 75 MEQ: 84 INJECTION INTRAVENOUS at 17:22

## 2017-10-23 RX ADMIN — EPINEPHRINE 0.04 MCG/KG/MIN: 1 INJECTION PARENTERAL at 11:52

## 2017-10-23 RX ADMIN — SODIUM BICARBONATE 50 MEQ: 84 INJECTION INTRAVENOUS at 18:53

## 2017-10-23 RX ADMIN — PROTAMINE SULFATE 250 MG: 10 INJECTION, SOLUTION INTRAVENOUS at 12:00

## 2017-10-23 RX ADMIN — FENTANYL CITRATE 50 MCG: 50 INJECTION INTRAMUSCULAR; INTRAVENOUS at 16:50

## 2017-10-23 RX ADMIN — FENTANYL CITRATE 50 MCG: 50 INJECTION INTRAMUSCULAR; INTRAVENOUS at 21:00

## 2017-10-23 RX ADMIN — PHENYLEPHRINE HYDROCHLORIDE 25 MCG: 10 INJECTION INTRAMUSCULAR; INTRAVENOUS; SUBCUTANEOUS at 09:33

## 2017-10-23 RX ADMIN — METOPROLOL TARTRATE 12.5 MG: 25 TABLET ORAL at 07:01

## 2017-10-23 RX ADMIN — SODIUM CHLORIDE, POTASSIUM CHLORIDE, SODIUM LACTATE AND CALCIUM CHLORIDE: 600; 310; 30; 20 INJECTION, SOLUTION INTRAVENOUS at 08:00

## 2017-10-23 RX ADMIN — POTASSIUM CHLORIDE 20 MEQ: 400 INJECTION, SOLUTION INTRAVENOUS at 20:04

## 2017-10-23 RX ADMIN — POTASSIUM CHLORIDE 20 MEQ: 400 INJECTION, SOLUTION INTRAVENOUS at 22:15

## 2017-10-23 RX ADMIN — Medication 15 ML: at 07:01

## 2017-10-23 RX ADMIN — PROTAMINE SULFATE 250 MG: 10 INJECTION, SOLUTION INTRAVENOUS at 13:04

## 2017-10-23 RX ADMIN — SODIUM CHLORIDE: 900 INJECTION, SOLUTION INTRAVENOUS at 07:50

## 2017-10-23 RX ADMIN — FENTANYL CITRATE 100 MCG: 50 INJECTION INTRAMUSCULAR; INTRAVENOUS at 09:05

## 2017-10-23 RX ADMIN — SODIUM CHLORIDE 75 ML/HR: 9 INJECTION, SOLUTION INTRAVENOUS at 14:15

## 2017-10-23 RX ADMIN — MIDAZOLAM HYDROCHLORIDE 2 MG: 1 INJECTION, SOLUTION INTRAMUSCULAR; INTRAVENOUS at 07:51

## 2017-10-23 RX ADMIN — ROCURONIUM BROMIDE 25 MG: 10 INJECTION INTRAVENOUS at 11:40

## 2017-10-23 RX ADMIN — VANCOMYCIN HYDROCHLORIDE 1500 MG: 10 INJECTION, POWDER, LYOPHILIZED, FOR SOLUTION INTRAVENOUS at 23:54

## 2017-10-23 RX ADMIN — DIGOXIN 250 MCG: 0.25 INJECTION INTRAMUSCULAR; INTRAVENOUS at 15:11

## 2017-10-23 RX ADMIN — MIDAZOLAM HYDROCHLORIDE 2 MG: 1 INJECTION, SOLUTION INTRAMUSCULAR; INTRAVENOUS at 12:48

## 2017-10-23 RX ADMIN — Medication 15 ML: at 15:46

## 2017-10-23 RX ADMIN — POTASSIUM CHLORIDE 20 MEQ: 400 INJECTION, SOLUTION INTRAVENOUS at 15:20

## 2017-10-23 RX ADMIN — SODIUM CHLORIDE, POTASSIUM CHLORIDE, SODIUM LACTATE AND CALCIUM CHLORIDE: 600; 310; 30; 20 INJECTION, SOLUTION INTRAVENOUS at 13:12

## 2017-10-23 RX ADMIN — CALCIUM CHLORIDE 1 G: 100 INJECTION, SOLUTION INTRAVENOUS at 18:49

## 2017-10-23 RX ADMIN — VASOPRESSIN 4 UNITS/HR: 20 INJECTION INTRAVENOUS at 20:05

## 2017-10-23 RX ADMIN — POTASSIUM CHLORIDE 20 MEQ: 400 INJECTION, SOLUTION INTRAVENOUS at 21:12

## 2017-10-23 RX ADMIN — ALBUMIN (HUMAN) 25 G: 12.5 INJECTION, SOLUTION INTRAVENOUS at 15:00

## 2017-10-23 RX ADMIN — TRANEXAMIC ACID 1.5 MG/KG/HR: 100 INJECTION, SOLUTION INTRAVENOUS at 08:47

## 2017-10-23 RX ADMIN — EPHEDRINE SULFATE 20 MG: 50 INJECTION, SOLUTION INTRAMUSCULAR; INTRAVENOUS; SUBCUTANEOUS at 08:45

## 2017-10-23 RX ADMIN — FENTANYL CITRATE 150 MCG: 50 INJECTION INTRAMUSCULAR; INTRAVENOUS at 08:38

## 2017-10-23 RX ADMIN — AMIODARONE HYDROCHLORIDE 1 MG/MIN: 50 INJECTION, SOLUTION INTRAVENOUS at 15:05

## 2017-10-23 RX ADMIN — LORAZEPAM 2 MG: 1 TABLET ORAL at 07:02

## 2017-10-23 RX ADMIN — VASOPRESSIN 4 UNITS/HR: 20 INJECTION INTRAVENOUS at 14:45

## 2017-10-23 RX ADMIN — ROCURONIUM BROMIDE 50 MG: 10 INJECTION INTRAVENOUS at 08:38

## 2017-10-23 RX ADMIN — POTASSIUM CHLORIDE 20 MEQ: 400 INJECTION, SOLUTION INTRAVENOUS at 16:11

## 2017-10-23 ASSESSMENT — PULMONARY FUNCTION TESTS
PIF_VALUE: 11
PIF_VALUE: 21
PIF_VALUE: 11
PIF_VALUE: 12
PIF_VALUE: 21
PIF_VALUE: 19
PIF_VALUE: 12
PIF_VALUE: 20
PIF_VALUE: 12
PIF_VALUE: 22
PIF_VALUE: 12
PIF_VALUE: 21
PIF_VALUE: 19
PIF_VALUE: 20
PIF_VALUE: 21

## 2017-10-23 NOTE — PLAN OF CARE
Problem: MECHANICAL VENTILATION  Goal: Patient will maintain patent airway  Outcome: Ongoing    Goal: Oral health is maintained or improved  Outcome: Ongoing    Goal: ET tube will be managed safely  Outcome: Ongoing    Goal: Mobility/activity is maintained at optimum level for patient  Outcome: Ongoing

## 2017-10-23 NOTE — ANESTHESIA PROCEDURE NOTES
Central Venous Line:    A central venous line was placed using surface landmarks, in the OR for the following indication(s): central venous access and CVP monitoring. Sterility preparation included the following: hand hygiene performed prior to procedure, maximum sterile barriers used and sterile technique used to drape from head to toe. The patient was placed in Trendelenburg position. The left subclavian vein was prepped. The site was prepped with Chloraprep.single lumen was placed. During the procedure, the following specific steps were taken: target vein identified, needle advanced into vein and blood aspirated and guidewire advanced into vein. Intravenous verification was obtained by venous blood return and ALEENA. Post insertion care included: all ports aspirated, all ports flushed easily, guidewire removed intact, Biopatch applied, line sutured in place and dressing applied. During the procedure the patient experienced: patient tolerated procedure well with no complications. Anesthesia type: generalA(n) oximetric, 7.5 (size) Pulmonary Artery Catheter (PAC) was placed through the Introducer CVL in the left subclavian vein. The PAC placement was confirmed by pressure tracing changes and ALEENA and secured at 58 cm (depth). The patient experienced the following events during the procedure: patient tolerated procedure well with no complications. PA Cath placed?: Yes  Staffing  Anesthesiologist: Mary Becerra  Preanesthetic Checklist  Completed: patient identified, timeout performed and patient being monitored
Central Venous Line:    A central venous line was placed using surface landmarks, in the OR for the following indication(s): central venous access and CVP monitoring. Sterility preparation included the following: hand hygiene performed prior to procedure, maximum sterile barriers used and sterile technique used to drape from head to toe. The patient was placed in Trendelenburg position. The right subclavian vein was prepped. The site was prepped with Chloraprep. introducer double lumen was placed. During the procedure, the following specific steps were taken: target vein identified, needle advanced into vein and blood aspirated and guidewire advanced into vein. Intravenous verification was obtained by venous blood return and ALEENA. Post insertion care included: all ports aspirated, all ports flushed easily, guidewire removed intact, Biopatch applied, line sutured in place and dressing applied. During the procedure the patient experienced: patient tolerated procedure well with no complications.       Anesthesia type: general..No  Staffing  Anesthesiologist: Thomas Trejo  Preanesthetic Checklist  Completed: patient identified, timeout performed and patient being monitored
ms  A= ms  E/A Ratio=   DT= ms  S/D=  IVRT=    Other Findings  Pericardium:  normal  Pleural Effusion:  none  Pulmonary Arteries:  normal  Pulmonary Venous Flow:  normal    Anesthesia Information  Performed Personally  Anesthesiologist:  Manuel Castano      Echocardiogram Comments:       Off pump  EF 55%  MV opens and close freely no stenosis with trivial MR  Aorta no dissection.

## 2017-10-23 NOTE — OP NOTE
10/23/2017    Cristo Zuniga    61 y.o. Pre-operative Diagnosis: SP MVR, Severe MR due oF degeneration of bioprosthesis, Ch AF    Post-operative Diagnosis: Same,     Findings: EF 50 %    Procedure:  Redo Sternotomy, Redo MVR with 27 mm ATS mechanical prosthesis, bi atrial Razo-Maze IV atrial fibrillation correction surgery.   Energy source - cryothermia  ALEENA by anesthesia    Anesthesia: General endotracheal    Surgeons/Assistants: Ean Martínez MD, Tha COLEMAN,     Estimated Blood Loss: 105+ ml    Complications: none    Specimen: explanted MV bioprosthesis    Ean Martínez MD

## 2017-10-23 NOTE — PROGRESS NOTES
Daily Progress Note     Admit Date: 10/17/2017  Bed/Room No.  1004/1004-01  Admitting Physician : Carmen Cosme MD  Code Status :Full Code  Hospital Day:  LOS: 6 days   Complaint at Admission :   Chief Complaint   Patient presents with    Shortness of Breath     worsening since wednesday, unlabored breathing, NAD, cardiac hx     Principal Problem:    Acute diastolic CHF (congestive heart failure) ([facility] [address]  Active Problems: Moderate mitral regurgitation    Paroxysmal atrial fibrillation (HCC)    Prolonged QT interval    Subjective: Interval History/Significant events :  10/23/17    Patient Status post surgery. Patient is on 3 pressors. Currently intubated and on sedation. Vitals - Stable afebrile  Labs - stable , normal kidney function and lites. Nursing notes , Consults notes reviewed. Overnight events and updates discussed with Nursing staff . Background history    Admitted for Acute diastolic CHF (congestive heart failure) ([facility] [address] , in hospital for 6 days . Rosamaria Chase 61 y.o. male   is admitted to the hospital for the management of  Acute diastolic CHF, moderate mitral regurgitation. Patient came to emergency room for shortness of breath. He reported that he has been having difficulty in breathing since May 2017. Patient followed with his cardiologist in July and had stress test and echocardiogram.  He was found to have moderate mitral regurgitation. Patient has underlying history of mitral valve replacement in February 2007. He has history of paroxysmal atrial fibrillation and has been on sotalol and atenolol. Patient had unsuccessful. Abrasion before. He also had Maze procedure performed during surgery 2007. Patient takes aspirin 81 mg daily. He reported cough, with clear sputum, orthopnea, PND. Patient denied any palpitations although he reported that her heart rate has been more than 110. Patient denies any fever, dizziness, lightheadedness.   He denies any leg swelling. Initial evaluation in the emergency room showed heart rate 130s. Troponins negative. CT chest was performed to rule out pulmonary embolism and did not show any central or subsegmental pulmonary emboli. Patient was given Lasix and admitted for further evaluation and treatment for acute CHF. Patient had atrial fibrillation and was started on Cardizem infusion and heparin low-dose infusion. Underwent trans-esophageal echocardiogram on 10/18/17 which showed dysfunctional bioprosthetic mitral valve , mean gradient 15 mmHg. Patient was recommended redo surgery. Patient underwent cardiac cath on 10/19/17 and showed normal coronaries. Patient underwent surgery on 10/23/17 and had mechanical valve placed. Postoperatively he needed to 3 pressors for blood pressure control.   PMH:  Past Medical History:   Diagnosis Date    Acute diastolic CHF (congestive heart failure) ([facility]) 10/17/2017      Allergies: No Known Allergies   Medications :    amiodarone 200 mg Oral BID   chlorhexidine 15 mL Mouth/Throat BID   [START ON 10/24/2017] therapeutic multivitamin-minerals 1 tablet Oral Daily with breakfast   [START ON 10/24/2017] simvastatin 20 mg Oral Nightly   aspirin 81 mg Oral Daily   vancomycin (VANCOCIN) IV 1,500 mg Intravenous Q12H   ketorolac 15 mg Intravenous Q6H   famotidine (PEPCID) injection 20 mg Intravenous BID   insulin lispro 0-6 Units Subcutaneous TID WC   insulin lispro 0-3 Units Subcutaneous Nightly   digoxin 250 mcg Intravenous Daily   albumin human          Review of Systems   Review of Systems   Unable to perform ROS: Intubated     Objective:   Current Vitals : Temp: 97.6 °F (36.4 °C),  Pulse: 104, Resp: 16, BP: (!) 136/95, SpO2: 97 %  Last 24 Hrs Vitals   Patient Vitals for the past 24 hrs:   BP Temp Temp src Pulse Resp SpO2 Weight   10/23/17 1652 - - - 104 - 97 % -   10/23/17 1528 - - - 108 - 96 % -   10/23/17 1408 - - - 96 - 92 % -   10/23/17 0637 - 97.6 °F (36.4 °C) - - - - -   10/23/17 0400 - - - - - - 196 lb 6.9 oz (89.1 kg)   10/23/17 0346 (!) 136/95 97.8 °F (36.6 °C) Oral 62 16 98 % -   10/22/17 2132 122/73 97.6 °F (36.4 °C) Oral 76 15 98 % -     Intake / output   10/22 1501 - 10/23 1500  In: 3991 [P.O.:360; I.V.:3299]  Out: 9668 [Urine:3200]  Physical Exam:  Physical Exam   Constitutional: Vital signs are normal. He is intubated. Subclavian on right. art line in place  Chest tube in place   HENT:   Head: Normocephalic and atraumatic. Eyes: Pupils are equal, round, and reactive to light. No scleral icterus. Neck: No JVD present. No thyromegaly present. Cardiovascular: Normal rate, regular rhythm, normal heart sounds and intact distal pulses. No murmur heard. Pulmonary/Chest: Breath sounds normal. He is intubated. He has no wheezes. He has no rales. Sternotomy wound with chest tube in place   Abdominal: Soft. Bowel sounds are normal. He exhibits no distension and no mass. Lymphadenopathy:     He has no cervical adenopathy. Neurological: He displays normal reflexes. He exhibits normal muscle tone. Skin: No rash noted. No erythema. Nursing note reviewed.     Lower Extremities : No ankle Edema , No calf Tenderness     Laboratory findings:    Recent Labs      10/21/17   0914  10/21/17   1121  10/22/17   0457  10/23/17   0412  10/23/17   1330  10/23/17   1428   WBC  6.3   --   5.1   --    --   26.4*   HGB  14.4   --   14.2   --    --   12.3*   HCT  42.5   --   41.6   --    --   36.4*   PLT  179   --   158  175  120*  119*   INR   --   1.0   --    --   1.7   --      Recent Labs      10/21/17   0552  10/22/17   0457  10/23/17   1428  10/23/17   1728   NA  139  141  144   --    K  4.0  4.2  3.5*   --    CL  101  101  107   --    CO2  27  30  19*   --    GLUCOSE  102*  103*  156*   --    BUN  18  20  20   --    CREATININE  0.90  0.95  1.01   --    MG  2.0  2.2  3.1*   --    CALCIUM  9.0  9.0  7.4*   --    CAION   --    --    --   1.09*     Recent Labs      10/21/17   0552   PROT  6.3* LABALBU  3.6   AST  35   ALT  91*   ALKPHOS  77   BILITOT  1.19      Imaging/Diagonstics:     CT chest pulmonary embolism 10/17/17 - no central or proximal segmental pulmonary embolus, mild to moderate CHF with small pleural effusion and pulmonary edema, 5 mm  lower lobe nodule.      EKG 10/17/17- sinus tachycardia    ALEENA - 10/18/17  Bioprosthetic mitral valve is seen. Thickened leaflets, severe mitral regurgitation with multiple jests seen. High velocity across the valve with mean gradient of 15 mm Hg suggestive of dysfunctional bioprosthetic mitral valve. Mild aortic and tricuspid regurgitation. Reduced left ventricular systolic function. No PFO seen by color Doppler. Cardiac cath 10/19/17   Severe 4+ mitral regurgitation.   Minimal CAD.   Normal LV systolic function.   Mildly dilated aortic root with mild AI.   Normal right heart pressures. Clinical Course : gradually improving  Assessment and Plan      1. Severe mitral valve regurgitation status post mechanical valve replacement on 10/23/17. Patient had previous bioprosthetic valve replacement on February 2017 . Patient will need long-term anticoagulation. 2. Acute diastolic CHF-     3. PAF - status post Razo maze procedure during surgery. Patient having V. tach, bigeminy postop. 4. Cardiac shock-on epinephrine, Levophed, vasopressin. Wean as tolerated. 5. QTc prolongation -sotalol discontinued. Postop management by CT surgery. Continue Godinez catheter. Check CBC tomorrow. Continue to monitor vitals , Intake / output ,  Cell count , HGB , Kidney function, oxygenation  as indicated . No family member at bedside.   Plan discussed with Staff Halley NUNEZ     (Please note that portions of this note were completed with a voice recognition program. Efforts were made to edit the dictations but occasionally words are mis-transcribed.)      Justo Kwan MD  10/23/2017

## 2017-10-24 ENCOUNTER — APPOINTMENT (OUTPATIENT)
Dept: GENERAL RADIOLOGY | Age: 57
DRG: 216 | End: 2017-10-24
Payer: COMMERCIAL

## 2017-10-24 LAB
ABO/RH: NORMAL
ANION GAP SERPL CALCULATED.3IONS-SCNC: 8 MMOL/L (ref 9–17)
ANTIBODY SCREEN: NEGATIVE
ARM BAND NUMBER: NORMAL
BLD PROD TYP BPU: NORMAL
BUN BLDV-MCNC: 22 MG/DL (ref 6–20)
BUN/CREAT BLD: ABNORMAL (ref 9–20)
CALCIUM IONIZED: 1.16 MMOL/L (ref 1.13–1.33)
CALCIUM SERPL-MCNC: 7.9 MG/DL (ref 8.6–10.4)
CHLORIDE BLD-SCNC: 113 MMOL/L (ref 98–107)
CO2: 24 MMOL/L (ref 20–31)
CREAT SERPL-MCNC: 0.89 MG/DL (ref 0.7–1.2)
CROSSMATCH RESULT: NORMAL
CULTURE: NO GROWTH
CULTURE: NORMAL
DIGOXIN DATE LAST DOSE: ABNORMAL
DIGOXIN DOSE AMOUNT: ABNORMAL
DIGOXIN DOSE TIME: ABNORMAL
DIGOXIN LEVEL: 3.4 NG/ML (ref 0.5–2)
DISPENSE STATUS BLOOD BANK: NORMAL
EXPIRATION DATE: NORMAL
GFR AFRICAN AMERICAN: >60 ML/MIN
GFR NON-AFRICAN AMERICAN: >60 ML/MIN
GFR SERPL CREATININE-BSD FRML MDRD: ABNORMAL ML/MIN/{1.73_M2}
GFR SERPL CREATININE-BSD FRML MDRD: ABNORMAL ML/MIN/{1.73_M2}
GLUCOSE BLD-MCNC: 105 MG/DL (ref 75–110)
GLUCOSE BLD-MCNC: 113 MG/DL (ref 75–110)
GLUCOSE BLD-MCNC: 120 MG/DL (ref 75–110)
GLUCOSE BLD-MCNC: 122 MG/DL (ref 75–110)
GLUCOSE BLD-MCNC: 129 MG/DL (ref 75–110)
GLUCOSE BLD-MCNC: 130 MG/DL (ref 75–110)
GLUCOSE BLD-MCNC: 137 MG/DL (ref 75–110)
GLUCOSE BLD-MCNC: 138 MG/DL (ref 75–110)
GLUCOSE BLD-MCNC: 142 MG/DL (ref 75–110)
GLUCOSE BLD-MCNC: 145 MG/DL (ref 75–110)
GLUCOSE BLD-MCNC: 146 MG/DL (ref 75–110)
GLUCOSE BLD-MCNC: 152 MG/DL (ref 75–110)
GLUCOSE BLD-MCNC: 154 MG/DL (ref 75–110)
GLUCOSE BLD-MCNC: 162 MG/DL (ref 75–110)
GLUCOSE BLD-MCNC: 78 MG/DL (ref 75–110)
GLUCOSE BLD-MCNC: 84 MG/DL (ref 70–99)
GLUCOSE BLD-MCNC: 90 MG/DL (ref 75–110)
HCT VFR BLD CALC: 23.7 % (ref 41–53)
HEMOGLOBIN: 8 G/DL (ref 13.5–17.5)
HEPARIN INDUCED PLATELET ANTIBODY: 0.19 O.D. (ref 0–0.4)
INR BLD: 1.2
Lab: NORMAL
MAGNESIUM: 2.2 MG/DL (ref 1.6–2.6)
MCH RBC QN AUTO: 28.3 PG (ref 26–34)
MCHC RBC AUTO-ENTMCNC: 33.8 G/DL (ref 31–37)
MCV RBC AUTO: 83.7 FL (ref 80–100)
PARTIAL THROMBOPLASTIN TIME: 36.1 SEC (ref 21.3–31.3)
PDW BLD-RTO: 14.4 % (ref 12.5–15.4)
PLATELET # BLD: 74 K/UL (ref 140–450)
PMV BLD AUTO: 9.9 FL (ref 6–12)
POTASSIUM SERPL-SCNC: 4.4 MMOL/L (ref 3.7–5.3)
POTASSIUM SERPL-SCNC: 4.5 MMOL/L (ref 3.7–5.3)
PROTHROMBIN TIME: 12.6 SEC (ref 9.4–12.6)
RBC # BLD: 2.83 M/UL (ref 4.5–5.9)
SODIUM BLD-SCNC: 145 MMOL/L (ref 135–144)
SPECIMEN DESCRIPTION: NORMAL
STATUS: NORMAL
TRANSFUSION STATUS: NORMAL
UNIT DIVISION: 0
UNIT NUMBER: NORMAL
WBC # BLD: 12.9 K/UL (ref 3.5–11)

## 2017-10-24 PROCEDURE — 85730 THROMBOPLASTIN TIME PARTIAL: CPT

## 2017-10-24 PROCEDURE — 2580000003 HC RX 258: Performed by: THORACIC SURGERY (CARDIOTHORACIC VASCULAR SURGERY)

## 2017-10-24 PROCEDURE — 83735 ASSAY OF MAGNESIUM: CPT

## 2017-10-24 PROCEDURE — 93005 ELECTROCARDIOGRAM TRACING: CPT

## 2017-10-24 PROCEDURE — P9041 ALBUMIN (HUMAN),5%, 50ML: HCPCS

## 2017-10-24 PROCEDURE — 97116 GAIT TRAINING THERAPY: CPT

## 2017-10-24 PROCEDURE — 84132 ASSAY OF SERUM POTASSIUM: CPT

## 2017-10-24 PROCEDURE — 99024 POSTOP FOLLOW-UP VISIT: CPT | Performed by: NURSE PRACTITIONER

## 2017-10-24 PROCEDURE — G8988 SELF CARE GOAL STATUS: HCPCS

## 2017-10-24 PROCEDURE — 99232 SBSQ HOSP IP/OBS MODERATE 35: CPT | Performed by: FAMILY MEDICINE

## 2017-10-24 PROCEDURE — 36415 COLL VENOUS BLD VENIPUNCTURE: CPT

## 2017-10-24 PROCEDURE — 97166 OT EVAL MOD COMPLEX 45 MIN: CPT

## 2017-10-24 PROCEDURE — 80048 BASIC METABOLIC PNL TOTAL CA: CPT

## 2017-10-24 PROCEDURE — 36430 TRANSFUSION BLD/BLD COMPNT: CPT

## 2017-10-24 PROCEDURE — 82330 ASSAY OF CALCIUM: CPT

## 2017-10-24 PROCEDURE — 94762 N-INVAS EAR/PLS OXIMTRY CONT: CPT

## 2017-10-24 PROCEDURE — 80162 ASSAY OF DIGOXIN TOTAL: CPT

## 2017-10-24 PROCEDURE — G8987 SELF CARE CURRENT STATUS: HCPCS

## 2017-10-24 PROCEDURE — 85610 PROTHROMBIN TIME: CPT

## 2017-10-24 PROCEDURE — 2500000003 HC RX 250 WO HCPCS: Performed by: THORACIC SURGERY (CARDIOTHORACIC VASCULAR SURGERY)

## 2017-10-24 PROCEDURE — 6360000002 HC RX W HCPCS: Performed by: THORACIC SURGERY (CARDIOTHORACIC VASCULAR SURGERY)

## 2017-10-24 PROCEDURE — 6360000002 HC RX W HCPCS

## 2017-10-24 PROCEDURE — 6370000000 HC RX 637 (ALT 250 FOR IP): Performed by: THORACIC SURGERY (CARDIOTHORACIC VASCULAR SURGERY)

## 2017-10-24 PROCEDURE — 82947 ASSAY GLUCOSE BLOOD QUANT: CPT

## 2017-10-24 PROCEDURE — 97535 SELF CARE MNGMENT TRAINING: CPT

## 2017-10-24 PROCEDURE — G8979 MOBILITY GOAL STATUS: HCPCS

## 2017-10-24 PROCEDURE — 2140000001 HC CVICU INTERMEDIATE R&B

## 2017-10-24 PROCEDURE — G8978 MOBILITY CURRENT STATUS: HCPCS

## 2017-10-24 PROCEDURE — S0028 INJECTION, FAMOTIDINE, 20 MG: HCPCS | Performed by: THORACIC SURGERY (CARDIOTHORACIC VASCULAR SURGERY)

## 2017-10-24 PROCEDURE — 71010 XR CHEST PORTABLE: CPT

## 2017-10-24 PROCEDURE — 86022 PLATELET ANTIBODIES: CPT

## 2017-10-24 PROCEDURE — 97162 PT EVAL MOD COMPLEX 30 MIN: CPT

## 2017-10-24 PROCEDURE — P9037 PLATE PHERES LEUKOREDU IRRAD: HCPCS

## 2017-10-24 PROCEDURE — 85027 COMPLETE CBC AUTOMATED: CPT

## 2017-10-24 PROCEDURE — 97530 THERAPEUTIC ACTIVITIES: CPT

## 2017-10-24 RX ORDER — CITALOPRAM 10 MG/1
5 TABLET ORAL DAILY
Status: DISCONTINUED | OUTPATIENT
Start: 2017-10-24 | End: 2017-10-30 | Stop reason: HOSPADM

## 2017-10-24 RX ORDER — METOCLOPRAMIDE 10 MG/1
10 TABLET ORAL EVERY 6 HOURS PRN
Status: DISCONTINUED | OUTPATIENT
Start: 2017-10-24 | End: 2017-10-30 | Stop reason: HOSPADM

## 2017-10-24 RX ORDER — FUROSEMIDE 10 MG/ML
20 INJECTION INTRAMUSCULAR; INTRAVENOUS 2 TIMES DAILY
Status: DISCONTINUED | OUTPATIENT
Start: 2017-10-24 | End: 2017-10-28

## 2017-10-24 RX ORDER — 0.9 % SODIUM CHLORIDE 0.9 %
250 INTRAVENOUS SOLUTION INTRAVENOUS ONCE
Status: COMPLETED | OUTPATIENT
Start: 2017-10-24 | End: 2017-10-24

## 2017-10-24 RX ORDER — WARFARIN SODIUM 2 MG/1
4 TABLET ORAL DAILY
Status: DISCONTINUED | OUTPATIENT
Start: 2017-10-24 | End: 2017-10-25

## 2017-10-24 RX ORDER — AMIODARONE HYDROCHLORIDE 200 MG/1
200 TABLET ORAL DAILY
Status: DISCONTINUED | OUTPATIENT
Start: 2017-10-25 | End: 2017-10-24

## 2017-10-24 RX ORDER — AMIODARONE HYDROCHLORIDE 200 MG/1
200 TABLET ORAL 2 TIMES DAILY
Status: DISCONTINUED | OUTPATIENT
Start: 2017-10-24 | End: 2017-10-26

## 2017-10-24 RX ADMIN — OXYCODONE HYDROCHLORIDE AND ACETAMINOPHEN 2 TABLET: 5; 325 TABLET ORAL at 04:52

## 2017-10-24 RX ADMIN — POTASSIUM CHLORIDE 20 MEQ: 400 INJECTION, SOLUTION INTRAVENOUS at 00:09

## 2017-10-24 RX ADMIN — AMIODARONE HYDROCHLORIDE 200 MG: 200 TABLET ORAL at 20:19

## 2017-10-24 RX ADMIN — KETOROLAC TROMETHAMINE 15 MG: 15 INJECTION, SOLUTION INTRAMUSCULAR; INTRAVENOUS at 18:40

## 2017-10-24 RX ADMIN — FUROSEMIDE 20 MG: 10 INJECTION, SOLUTION INTRAVENOUS at 08:12

## 2017-10-24 RX ADMIN — KETOROLAC TROMETHAMINE 15 MG: 15 INJECTION, SOLUTION INTRAMUSCULAR; INTRAVENOUS at 12:47

## 2017-10-24 RX ADMIN — FENTANYL CITRATE 50 MCG: 50 INJECTION INTRAMUSCULAR; INTRAVENOUS at 01:42

## 2017-10-24 RX ADMIN — OXYCODONE HYDROCHLORIDE AND ACETAMINOPHEN 2 TABLET: 5; 325 TABLET ORAL at 09:44

## 2017-10-24 RX ADMIN — WARFARIN SODIUM 4 MG: 2 TABLET ORAL at 18:40

## 2017-10-24 RX ADMIN — AMIODARONE HYDROCHLORIDE 200 MG: 200 TABLET ORAL at 08:16

## 2017-10-24 RX ADMIN — FAMOTIDINE 20 MG: 10 INJECTION, SOLUTION INTRAVENOUS at 20:20

## 2017-10-24 RX ADMIN — DIGOXIN 250 MCG: 0.25 INJECTION INTRAMUSCULAR; INTRAVENOUS at 08:17

## 2017-10-24 RX ADMIN — CITALOPRAM 5 MG: 10 TABLET, FILM COATED ORAL at 09:35

## 2017-10-24 RX ADMIN — FUROSEMIDE 20 MG: 10 INJECTION, SOLUTION INTRAVENOUS at 18:41

## 2017-10-24 RX ADMIN — KETOROLAC TROMETHAMINE 15 MG: 15 INJECTION, SOLUTION INTRAMUSCULAR; INTRAVENOUS at 06:21

## 2017-10-24 RX ADMIN — SODIUM CHLORIDE 250 ML: 9 INJECTION, SOLUTION INTRAVENOUS at 08:11

## 2017-10-24 RX ADMIN — Medication 15 ML: at 08:16

## 2017-10-24 RX ADMIN — KETOROLAC TROMETHAMINE 15 MG: 15 INJECTION, SOLUTION INTRAMUSCULAR; INTRAVENOUS at 00:19

## 2017-10-24 RX ADMIN — POTASSIUM CHLORIDE 20 MEQ: 400 INJECTION, SOLUTION INTRAVENOUS at 06:20

## 2017-10-24 RX ADMIN — METOCLOPRAMIDE 10 MG: 10 TABLET ORAL at 13:54

## 2017-10-24 RX ADMIN — ALBUMIN (HUMAN) 25 G: 12.5 INJECTION, SOLUTION INTRAVENOUS at 00:18

## 2017-10-24 RX ADMIN — DEXTROSE MONOHYDRATE 12.5 G: 25 INJECTION, SOLUTION INTRAVENOUS at 06:37

## 2017-10-24 RX ADMIN — SIMVASTATIN 20 MG: 20 TABLET, FILM COATED ORAL at 20:19

## 2017-10-24 RX ADMIN — VANCOMYCIN HYDROCHLORIDE 1500 MG: 10 INJECTION, POWDER, LYOPHILIZED, FOR SOLUTION INTRAVENOUS at 11:30

## 2017-10-24 RX ADMIN — ALBUMIN, HUMAN INJ 5% 25 G: 5 SOLUTION at 00:18

## 2017-10-24 RX ADMIN — MULTIPLE VITAMINS W/ MINERALS TAB 1 TABLET: TAB at 08:16

## 2017-10-24 RX ADMIN — FAMOTIDINE 20 MG: 10 INJECTION, SOLUTION INTRAVENOUS at 08:16

## 2017-10-24 ASSESSMENT — PAIN SCALES - GENERAL
PAINLEVEL_OUTOF10: 2
PAINLEVEL_OUTOF10: 6
PAINLEVEL_OUTOF10: 6
PAINLEVEL_OUTOF10: 4
PAINLEVEL_OUTOF10: 1
PAINLEVEL_OUTOF10: 0
PAINLEVEL_OUTOF10: 3
PAINLEVEL_OUTOF10: 0
PAINLEVEL_OUTOF10: 1
PAINLEVEL_OUTOF10: 0
PAINLEVEL_OUTOF10: 8
PAINLEVEL_OUTOF10: 0

## 2017-10-24 ASSESSMENT — ENCOUNTER SYMPTOMS
COUGH: 0
BLOOD IN STOOL: 0
VOMITING: 0
ABDOMINAL PAIN: 0
SHORTNESS OF BREATH: 0
SINUS PRESSURE: 0
SORE THROAT: 0
WHEEZING: 0
NAUSEA: 1
CONSTIPATION: 0
DIARRHEA: 0
VOICE CHANGE: 0

## 2017-10-24 ASSESSMENT — PAIN DESCRIPTION - LOCATION
LOCATION: CHEST;BACK
LOCATION: CHEST;BACK
LOCATION: CHEST
LOCATION: CHEST;BACK
LOCATION: GENERALIZED
LOCATION: GENERALIZED

## 2017-10-24 ASSESSMENT — PAIN DESCRIPTION - PAIN TYPE
TYPE: ACUTE PAIN;SURGICAL PAIN
TYPE: SURGICAL PAIN;ACUTE PAIN
TYPE: ACUTE PAIN;SURGICAL PAIN
TYPE: ACUTE PAIN
TYPE: ACUTE PAIN
TYPE: ACUTE PAIN;SURGICAL PAIN

## 2017-10-24 NOTE — PROGRESS NOTES
History  Social/Functional History  Lives With: Spouse  Type of Home: House  Home Layout: Two level, Bed/Bath upstairs  Home Access: Stairs to enter with rails  Entrance Stairs - Number of Steps: 4 LIZZ  Entrance Stairs - Rails: Both  Bathroom Shower/Tub: Tub/Shower unit  Bathroom Equipment:  (none, reports can get shower chair)  Home Equipment: Rolling walker, Cane, [address] Help From: Family  ADL Assistance: Independent  Homemaking Assistance: Independent  Homemaking Responsibilities: Yes (wife is also able to assist)  Meal Prep Responsibility: Secondary  Laundry Responsibility: Secondary  Cleaning Responsibility: Secondary  Shopping Responsibility: Secondary  Ambulation Assistance: Independent  Transfer Assistance: Independent  Active : Yes  Mode of Transportation: Family     Objective   Vision: Impaired  Vision Exceptions: Wears glasses at all times  Hearing: Within functional limits    Orientation  Overall Orientation Status: Within Functional Limits  Observation/Palpation  Posture: Fair  Observation: slight kyphosis, dizziness  Balance  Sitting Balance: Supervision (seated in chair )  Standing Balance: Moderate assistance (use of RW )  Standing Balance  Sit to stand: Moderate assistance  Stand to sit: Moderate assistance  Functional Mobility  Functional - Mobility Device: Rolling Walker  Activity: Other  Assist Level: Moderate assistance  Functional Mobility Comments: use of RW, verbal cues for hand placement. Pt demo no LOB nut did report increased dizziness and SOB. ADL  Feeding: Independent  Grooming: Minimal assistance  UE Bathing: Minimal assistance  LE Bathing: Maximum assistance  UE Dressing: Minimal assistance (to don second gown )  LE Dressing: Maximum assistance (to don socks)  Toileting: Maximum assistance (catheter)  Additional Comments: Pt supine in bed on arrival. Pt assisted to don socks. Pt assisted to complete bed mobility and sit at EOB.  Pt reported increased dizziness on sitting. Pt educated in proper pursed lipped breathing tech and BP WNL. Pt assisted to complete sit <> stand transfer and completed steps to chair with verbal cues for hand placement on RW. Pt educated in sternal precautions. Pt assisted to perform stand > sit transfer into chair. Pt remained in chair, call light in reach and RN notified on therapist exit. Wife present for session with pt permission    Tone RUE  RUE Tone: Normotonic  Tone LUE  LUE Tone: Normotonic  Coordination  Movements Are Fluid And Coordinated: Yes     Bed mobility  Rolling to Right: Moderate assistance  Supine to Sit: Moderate assistance  Sit to Supine:  (pt up to chair on exit )  Scooting: Moderate assistance  Comment: HOB raised, verbal cues for hand placement   Transfers  Stand Step Transfers: Moderate assistance  Sit to stand: Moderate assistance  Stand to sit: Moderate assistance  Transfer Comments: use of RW, reported increased dizziness      Cognition  Overall Cognitive Status: WFL  Perception  Overall Perceptual Status: WFL     Sensation  Overall Sensation Status: Impaired  Additional Comments: Pt reports some N/T in B hands since surgery       LUE AROM (degrees)  LUE AROM : WFL  LUE General AROM: Not formally tested d/t sternal precautions   RUE AROM (degrees)  RUE AROM : WFL  RUE General AROM: Not formally tested d/t sternal precautions   LUE Strength  Gross LUE Strength: WFL  LUE Strength Comment: Not formally tested d/t sternal precautions   RUE Strength  Gross RUE Strength: WFL  RUE Strength Comment: Not formally tested d/t sternal precautions     Assessment   Performance deficits / Impairments: Decreased functional mobility ; Decreased endurance;Decreased ADL status  Prognosis: Good  Decision Making: Medium Complexity  Patient Education: OT POC, discharge planning, safety, importance of evaluation   Discharge Recommendations: Home with assist PRN;Continue to assess pending progress  REQUIRES OT FOLLOW UP: Yes  Activity Tolerance  Activity Tolerance: Patient limited by fatigue  Safety Devices  Safety Devices in place: Yes  Type of devices: Left in chair;Nurse notified;Gait belt;Call light within reach  Restraints  Initially in place: No  OT Equipment Recommendations  Other: Pt and wife educated in getting shower chair for  temporary use, wife reports will get prior to discharge         Discharge Recommendations:  Home with assist PRN, Continue to assess pending progress     Plan   Plan  Times per week: 4-5x/wk   Current Treatment Recommendations: Patient/Caregiver Education & Training, Equipment Evaluation, Education, & procurement, Home Management Training, Self-Care / ADL, Safety Education & Training, Functional Mobility Training, Endurance Training    G-Code  OT G-codes  Functional Assessment Tool Used: [facility]  Score: 15/24  Functional Limitation: Self care  Self Care Current Status (): At least 40 percent but less than 60 percent impaired, limited or restricted  Self Care Goal Status (): At least 20 percent but less than 40 percent impaired, limited or restricted    Goals  Short term goals  Time Frame for Short term goals: by discharge, pt will  Short term goal 1: demo I in UE ADL actiivtes   Short term goal 2: demo I in LE ADL activites   Short term goal 3: demo understanding and I use of safe transfer tech during functional activites with RW  Short term goal 4: demo understanding and I use of EC/WS, fall prevention, proper pursed lipped breathing tech and sternal precautions during functional activites  Short term goal 5: demo increased standing tolerance of 15+ min to assist with ADL/ fucntional activities   Short term goal 6: demo I in functional transfers and mobility to assist with ADL/ functional activites   Patient Goals   Patient goals : to go home     Therapy Time   Individual Concurrent Group Co-treatment   Time In 1351         Time Out 1421         Minutes 30          See above for LOF.  RN

## 2017-10-24 NOTE — PROGRESS NOTES
[facility] Progress Note    10/24/2017 7:37 AM    Surgeon: Aniyah Trevino MD       Cardiologist: Aniyah Trevino MD    POD# 1  S/P:  Redo sternotomy, MAZE, Mitral Valve replacement with mechanical valve, reclosure of ALYCE  EF: 50%      Subjective: resting in bed, pain controlled, no complaints at this time    Objective: BP (!) 136/95   Pulse 79   Temp 97.6 °F (36.4 °C)   Resp 18 Comment: non labored  Ht 5' 9\" (1.753 m)   Wt 218 lb 9.6 oz (99.2 kg)   SpO2 95%   BMI 32.28 kg/m²   In: 6045 [I.V.:5045]  Out: 2675   Patient Vitals for the past 96 hrs (Last 3 readings):   Weight   10/24/17 0600 218 lb 9.6 oz (99.2 kg)   10/23/17 0400 196 lb 6.9 oz (89.1 kg)   10/22/17 0451 196 lb 6.4 oz (89.1 kg)     General: alert and oriented to person, place and time, well-developed and well-nourished, in no acute distress  Heart:Normal S1 and S2.  Regular rhythm. No murmurs, gallops, or rubs.   + click              pacing wires Yes              chest tubes Yes, In: 6045   Out: 2675 [Urine:1305]In: 5588   Out: 4828 [Urine:1305]  Lungs: clear to auscultation bilaterally  Abdomen: soft, nontender, no HSM, no guarding, no rebound, no masses, hypoactive  Extremities: trace peripheral edema, pulses palp  Wounds: clean, dry, no drainage    Chest X-Ray:   CBC: Recent Labs      10/23/17   1728  10/23/17   2004  10/23/17   2335  10/24/17   0451   WBC  30.6*   --   24.3*  12.9*   HGB  11.3*  10.9*  10.0*  8.0*   HCT  33.5*  32.3*  29.6*  23.7*   MCV  85.2   --   84.1  83.7   PLT  112*  110*  111*  74*     BMP: Recent Labs      10/23/17   1728  10/23/17   2333  10/24/17   0154  10/24/17   0451   NA  144  145*   --   145*   K  3.6*  4.0  4.5  4.4   CL  108*  110*   --   113*   CO2  18*  21   --   24   BUN  21*  21*   --   22*   CREATININE  1.16  1.00   --   0.89     PT/INR:   Recent Labs      10/21/17   1121  10/23/17   1330  10/23/17   2005   PROTIME  11.1  17.9*  12.6   INR  1.0  1.7  1.2     Meds:  citalopram 5 mg Daily   warfarin 4 mg Daily   furosemide 20 mg BID   sodium chloride 250 mL Once   amiodarone 200 mg BID   chlorhexidine 15 mL BID   therapeutic multivitamin-minerals 1 tablet Daily with breakfast   simvastatin 20 mg Nightly   aspirin 81 mg Daily   vancomycin (VANCOCIN) IV 1,500 mg Q12H   ketorolac 15 mg Q6H   famotidine (PEPCID) injection 20 mg BID   insulin lispro 0-6 Units TID WC   insulin lispro 0-3 Units Nightly   digoxin 250 mcg Daily     Infusions:    sodium chloride 75 mL/hr (10/23/17 1415)    hetastarch 6% in 0.9% NaCl infusion 500 mL      dextrose      vasopressin infusion Stopped (10/24/17 0430)    insulin (HUMAN R) non-weight based infusion 1.86 Units/hr (10/24/17 0705)    EPINEPHrine infusion Stopped (10/24/17 0530)    propofol 15 mcg/kg/min (10/23/17 1746)    norepinephrine Stopped (10/24/17 0300)    amiodarone 450mg/250ml D5W infusion 1 mg/min (10/23/17 1505)    dexmedetomidine (PRECEDEX) IV infusion Stopped (10/23/17 2300)     Beta Blocker: No: soft pressures  ASA: No: hold today, start tomorrow  Plavix: No: hold today, start tomorrow  ACE inh: No:   Lovenox: No:   Coumadin: No: will start when appropriate, INR goal 2.5-3.5  Ulcer Prophylaxis: Yes    Assessment:  S/p MVR, mechinacal (redo sternotomy), ALYCE closure, MAZE  Paroxysmal afib    1. D/C swan  2. D/C art line  3. Furosemide 20 mg IV x1  4. Start warfarin today, goal 2.5-3.5, daily INR  5.  Get him up to chair, C and DB exercises      Discussed with during rounds with Dr Mercy Jacobs, CNP

## 2017-10-24 NOTE — ANESTHESIA POSTPROCEDURE EVALUATION
Department of Anesthesiology  Postprocedure Note    Patient: Jules Fuentes  MRN: [id number]  YOB: 1960  Date of evaluation: 10/24/2017  Time:  6:55 AM     Procedure Summary     Date:  10/23/17 Room / Location:  [address] [facility]    Anesthesia Start:  0750 Anesthesia Stop:  [phone]    Procedure:  REDO STERNOTOMY, MITRAL VALVE REPLACEMENT 27MM MED[employer] OPEN PIVOT MECHANICAL, ON PUMP, SWAN, ALEENA (N/A ) Diagnosis:  (SEVER MITRAL VALVE REGURGE)    Surgeon:  Sherry Aguirre MD Responsible Provider:  Cal German MD    Anesthesia Type:  general ASA Status:  4          Anesthesia Type: general    Malgorzata Phase I:      Malgorzata Phase II:      Last vitals: Reviewed and per EMR flowsheets.        Anesthesia Post Evaluation    Patient location during evaluation: ICU  Patient participation: complete - patient participated  Level of consciousness: awake and alert  Pain score: 3  Airway patency: patent  Nausea & Vomiting: no nausea and no vomiting  Complications: no  Cardiovascular status: hemodynamically stable  Respiratory status: acceptable  Hydration status: euvolemic

## 2017-10-24 NOTE — PROGRESS NOTES
Order obtain for extubation. SpO2 of 99 on 40% FiO2. Patient extubated and placed on 4 liters/min via nasal cannula. Post extubation SpO2 is 99% with HR  91 bpm and RR 20 non labored breaths/min. Patient had mild cough that was productive of blood streaked sputum. Extubation Well tolerated by patient. .   Breath Sounds: clear t/o     Sandi Tyrone   10:41 PM

## 2017-10-24 NOTE — PROGRESS NOTES
Physical Therapy  DATE: 10/24/2017    NAME: Kaveh Lugo  MRN: [id number]   : 1960    Patient not seen this date for Physical Therapy due to:  [] Blood transfusion in progress  [] Hemodialysis  []  Patient Declined  [] Spine Precautions   [] Strict Bedrest  [] Surgery/ Procedure  [] Testing      [x] Other: groin line and swan in place per RN. Will check in pm.        [] PT being discontinued at this time. Patient independent. No further needs. [] PT being discontinued at this time as the patient has been transferred to palliative care. No further needs.     Nidhi Castellanos, PT

## 2017-10-24 NOTE — PLAN OF CARE
[facility]    Second Visit Note  For more detailed information please refer to the progress note of the day      10/24/2017    6:22 PM    Name:   Sukhwinder Garibay  MRN:     [id number]     Michelle[place]side:      [de-identified]   Room:   [address] [place] [address] Day:  7  Admit Date:  10/17/2017  4:54 AM    PCP:   Jon Huntley MD  Code Status:  Full Code        Pt vitals were reviewed   New labs were reviewed   Patient was seen    Updated plan :     1. Patient doing [id number] Jacki Stout , on [place] . On coumadin, low PLT. Will need heparin for bridging . HIT negative . Check platelet count tomorrow morning. Check INR daily.         Estrellita Peralta MD  10/24/2017  6:22 PM

## 2017-10-24 NOTE — PLAN OF CARE
Problem: Safety:  Goal: Free from accidental physical injury  Free from accidental physical injury   Outcome: Ongoing      Problem: HEMODYNAMIC STATUS  Goal: Patient has stable vital signs and fluid balance  Outcome: Ongoing      Problem: FLUID AND ELECTROLYTE IMBALANCE  Goal: Fluid and electrolyte balance are achieved/maintained  Outcome: Ongoing      Problem: Falls - Risk of:  Goal: Absence of physical injury  Absence of physical injury   Outcome: Ongoing      Problem: Bleeding:  Goal: Will show no signs and symptoms of excessive bleeding  Will show no signs and symptoms of excessive bleeding   Outcome: Ongoing      Problem: Falls - Risk of  Goal: Absence of falls  Outcome: Ongoing      Problem: Discharge Planning:  Goal: Discharged to appropriate level of care  Discharged to appropriate level of care   Outcome: Ongoing      Problem:  Activity Intolerance:  Goal: Able to perform prescribed physical activity  Able to perform prescribed physical activity   Outcome: Ongoing    Goal: Ability to tolerate increased activity will improve  Ability to tolerate increased activity will improve   Outcome: Ongoing      Problem: Anxiety:  Goal: Level of anxiety will decrease  Level of anxiety will decrease   Outcome: Ongoing      Problem: Cardiac Output - Decreased:  Goal: Cardiac output within specified parameters  Cardiac output within specified parameters   Outcome: Ongoing    Goal: Hemodynamic stability will improve  Hemodynamic stability will improve   Outcome: Ongoing      Problem: Fluid Volume - Imbalance:  Goal: Ability to achieve a balanced intake and output will improve  Ability to achieve a balanced intake and output will improve   Outcome: Ongoing    Goal: Chest tube drainage is within specified parameters  Chest tube drainage is within specified parameters   Outcome: Ongoing      Problem: Gas Exchange - Impaired:  Goal: Levels of oxygenation will improve  Levels of oxygenation will improve   Outcome: Ongoing    Goal: Ability to maintain adequate ventilation will improve  Ability to maintain adequate ventilation will improve   Outcome: Ongoing      Problem: Pain:  Goal: Pain level will decrease  Pain level will decrease   Outcome: Ongoing    Goal: Control of acute pain  Control of acute pain   Outcome: Ongoing    Goal: Control of chronic pain  Control of chronic pain   Outcome: Ongoing      Problem: Tissue Perfusion - Cardiopulmonary, Altered:  Goal: Absence of angina  Absence of angina   Outcome: Ongoing    Goal: Hemodynamic stability will improve  Hemodynamic stability will improve   Outcome: Ongoing    Goal: Will show no evidence of cardiac arrhythmias  Will show no evidence of cardiac arrhythmias   Outcome: Ongoing      Problem: Tobacco Use:  Goal: Will participate in inpatient tobacco-use cessation counseling  Will participate in inpatient tobacco-use cessation counseling   Outcome: Ongoing      Problem: Risk for Impaired Skin Integrity  Goal: Tissue integrity - skin and mucous membranes  Structural intactness and normal physiological function of skin and  mucous membranes.    Outcome: Ongoing

## 2017-10-24 NOTE — PROGRESS NOTES
[facility] [address]  Occupational Therapy Not Seen Note    Patient not available for Occupational Therapy due to:    [] Testing:    [] Hemodialysis    [] Blood Transfusion in Progress    []Refusal by Patient:    [] Surgery/Procedure:    [] Strict Bedrest    [] Sedation    [] Spine Precautions     [] Pt being transferred to palliative care at this time. Spoke with pt/family and OT services to be defered. [] Pt independent with functional mobility and functional tasks. Pt with no OT acute care needs at this time, will defer OT eval.    [x] Other: groin line and swan in place per RN.  Will check in pm    Next Scheduled Treatment: re-check 10/24/2017    Signature: NIESHA Spears/MEENU

## 2017-10-24 NOTE — PROGRESS NOTES
[facility]   Progress Note                   Date:   10/24/2017  Patient name: Adina Marcos  Date of admission:  10/17/2017  4:54 AM  MRN:   [id number]  YOB: 1960  PCP: Marleni Muñoz MD    Reason for Admission: CHF (congestive heart failure), NYHA class II, acute on chronic, systolic (HCC) [S51.51]    Subjective:   Underwent redo MVR with mechanical prosthesis & Bi-atrial Razo-Maze IV on 10/23/17  Lying in bed with mild to mod discomfort. Breathing well. Mild chest pain. Good UOP.       Medications:   Scheduled Meds:   citalopram  5 mg Oral Daily    warfarin  4 mg Oral Daily    furosemide  20 mg Intravenous BID    sodium chloride  250 mL Intravenous Once    warfarin (COUMADIN) daily dosing (placeholder)   Other RX Placeholder    amiodarone  200 mg Oral BID    chlorhexidine  15 mL Mouth/Throat BID    therapeutic multivitamin-minerals  1 tablet Oral Daily with breakfast    simvastatin  20 mg Oral Nightly    aspirin  81 mg Oral Daily    vancomycin (VANCOCIN) IV  1,500 mg Intravenous Q12H    ketorolac  15 mg Intravenous Q6H    famotidine (PEPCID) injection  20 mg Intravenous BID    insulin lispro  0-6 Units Subcutaneous TID WC    insulin lispro  0-3 Units Subcutaneous Nightly    digoxin  250 mcg Intravenous Daily     Continuous Infusions:   sodium chloride 75 mL/hr (10/23/17 1415)    hetastarch 6% in 0.9% NaCl infusion 500 mL      dextrose      vasopressin infusion Stopped (10/24/17 0430)    insulin (HUMAN R) non-weight based infusion 1.86 Units/hr (10/24/17 0705)    EPINEPHrine infusion Stopped (10/24/17 0530)    propofol 15 mcg/kg/min (10/23/17 1746)    norepinephrine Stopped (10/24/17 0300)    amiodarone 450mg/250ml D5W infusion 0.5 mg/min (10/24/17 0811)    dexmedetomidine (PRECEDEX) IV infusion Stopped (10/23/17 2300)     CBC:   Recent Labs      10/23/17   1728  10/23/17   2004  10/23/17   2335  10/24/17   0451   WBC  30.6*   --   24.3*  12.9*   HGB 11.3*  10.9*  10.0*  8.0*   PLT  112*  110*  111*  74*     BMP:    Recent Labs      10/23/17   1728  10/23/17   2333  10/24/17   0154  10/24/17   0451   NA  144  145*   --   145*   K  3.6*  4.0  4.5  4.4   CL  108*  110*   --   113*   CO2  18*  21   --   24   BUN  21*  21*   --   22*   CREATININE  1.16  1.00   --   0.89   GLUCOSE  204*  191*   --   84     Hepatic:   No results for input(s): AST, ALT, ALB, BILITOT, ALKPHOS in the last 72 hours. Troponin:   No results for input(s): TROPONINI in the last 72 hours. BNP: No results for input(s): BNP in the last 72 hours. Lipids:   No results for input(s): CHOL, HDL in the last 72 hours. Invalid input(s): LDLCALCU  INR:   Recent Labs      10/21/17   1121  10/23/17   1330  10/23/17   2005   INR  1.0  1.7  1.2       STRESS 7/27/17: Small inferior infarct. EF 50%. WMA.      TTE 7/27/17: EF 50%. Segmental WMA. Bioprosthetic MV has evidence of stenosis. ALEENA on 10/18/17:  Bioprosthetic mitral valve is seen. Thickened leaflets, severe mitral regurgitation with multiple jests seen. High velocity across the valve with mean gradient of 15 mm Hg suggestive of dysfunctional bioprosthetic mitral valve. Mild aortic and tricuspid regurgitation. Reduced left ventricular systolic function. No PFO seen by color Doppler. Condition discussed with family and patient, consider CT surgery evaluation. Will need RHC and zohreh nary angiography before any anticipated valve redo surgery. No complications    CARDIAC CATH 10/19/17  Severe 4+ mitral regurgitation  Minimal CAD  Normal LV systolic function  Mildly dilated aortic root with mild AI  Normal right heart pressures    MVR & Razo-Maze (10/23/17):   Redo MVR with 27 mm ATS mechanical prosthesis  Bi-atrial Razo-Maze IV     Objective:   Vitals: /73   Pulse 83   Temp 98.4 °F (36.9 °C) (Oral)   Resp 18   Ht 5' 9\" (1.753 m)   Wt 218 lb 9.6 oz (99.2 kg)   SpO2 95%   BMI 32.28 kg/m²   General appearance: alert and cooperative with exam  HEENT: Head: Normocephalic, no lesions, without obvious abnormality. Neck: no JVD, trachea midline, no adenopathy  Lungs: Clear to auscultation  Heart: regular rhythm with normal rate, s1/s2 auscultated, +3/6 murmur left axillary region. Abdomen: soft, non-tender, bowel sounds active  Extremities: no edema. Right groin soft, no drainage, no hematoma, no ecchymosis   Neurologic: not done      Assessment / Acute Cardiac Problems:   1. Severe Mitral Regurgitation s/p redo MVR with 27 mm ATS mechanical prosthesis and Bi-atrial Razo-Maze IV (including ALYCE closure) on 10/23/17  2. 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 Paroxysmal A. Fib with RVR (Chads2-Vasc score 1), on Sotalol at home  4. H/O A. Fib ablation  5. Post op blood loss anemia  6. Essential HTN      Plan of Treatment:   1. Routine post op care per CTS  2. Continue Aspirin, Simvastatin  3. Remains in NSR, continue with PO Amio  4. Receiving Dig per CTS, will consider stopping today  5. Continue IV Lasix 20mg BID  6. Warfarin started by CTS, goal INR 2.5 - 3.5  7. Monitor labs    Will discuss with rounding attending Dr. Facundo Machuca for final recommendations. Damari Doshi MD   Fellow, [address]    I performed a history and physical examination of the patient and discussed management with the resident. I reviewed the residents note and agree with the documented findings and plan of care. Any areas of disagreement are noted on the chart. I was personally present for the key portions of any procedures. I have documented in the chart those procedures where I was not present during the key portions. I have personally evaluated this patient and have completed at least one if not all key elements of the E/M (history, physical exam, and MDM). Additional findings are as noted. Possible Junctional rhythm. Obtain 12 lead ECG. DC IV digoxin.

## 2017-10-24 NOTE — OP NOTE
the right. The postoperative transesophageal  echocardiography initially showed a small perivalvular leak which was  then fixed with a running 3-0 Prolene suture all around from the  mitral prosthetic to the atrial wall. The second echo showed no  evidence of any perivalvular leak and the gradient across the mitral  valve prosthesis was 2 mmHg. The patient required AV sequential  pacing and small dose of inotropic support. The posterior leaflet had  been left intact, and this was also left intact. The valve was placed  in the anatomical position. PROCEDURE:  After satisfactory general endotracheal anesthesia, the  patient was positioned. Skin was prepped from chin to ankles and  draped in the usual fashion. Redo sternotomy was carried out with the  oscillating saw. The heart was then dissected out starting from the  inferior wall going around. The aorta was dissected out and  pursestring sutures were placed in the ascending aorta. After  heparinization, aortic cannulation was carried out. A single venous  cannula was placed into the right atrium, and the patient was placed  on cardiopulmonary bypass. The temperature was gradually decreased to  34 degrees centigrade. The rest of the dissection was carried out on  cardiopulmonary bypass. The cardioplegia delivery catheter was  established in the ascending aorta and the aorta was crossclamped. Cold blood cardioplegia using del Nido solution was given in the  aortic root. Cardiac arrest was obtained. Left atrium was opened. The mitral valve bioprosthesis was explanted. All the pledgets were  removed. The left atrial appendage was noted to have partial opening. This was then closed with 3-0 Prolene in double layer sutures. Once  this was done, the cryo Maze lesions were placed by isolating the left  atrial appendage as well as the pulmonary veins. The box lesion was  placed which was then connected to the posterior mitral annulus at P3  level. These were 2 minutes each lesions. Once this was done, the  annulus was measured and it admitted a 27 mm sizer. Interrupted 2-0  Ethibond sutures were placed in the mitral annulus with pledgets  towards the atrial side. These were then passed through the sewing  ring of the valve, and the valve was seated, seating was satisfactory,  sutures were ligated with COR-KNOT device. The patient's temperature  was allowed to rewarm. Head was lowered. The aortic crossclamp was  removed while the cardioplegia needle was placed on suction for  evacuation of air. Left atrium was closed with two layers of 3-0  Prolene continuous suture technique. There was spontaneous  defibrillation. The temporary atrial and ventricular pacing wires  were placed. Transesophageal echocardiography was repeated, and it  showed a small perivalvular leak in the posterior annular area. The  aorta was then re-crossclamped. Another dose of cold cardioplegia was  given. Left atrium was reopened and a running 3-0 Prolene suture was  placed all around the annulus incorporating the mechanical prosthesis  sewing ring to the atrial wall. Once this was done, it was felt that  this was very secure. The patient's temperature was rewarmed. Head  was lowered, heart was allowed to fill. Aortic cross-clamp was  removed while the bleeding hole was kept on the anterior surface of  the aorta. The left atrium was closed with two layers of 3-0 Prolene  continuous suture technique. Temporary atrial and ventricular wires  were used for AV pacing. The transesophageal echocardiography was  repeated and showed no evidence of perivalvular leak.   Once the  temperature was satisfactory and the rhythm was satisfactory, the  right-sided Maze lesions were done by starting from the intracaval  lesion which was connected to the posterior tricuspid isthmus followed  by the right atrial appendage to the anterior tricuspid isthmus as  well as from the right atrial

## 2017-10-24 NOTE — PROGRESS NOTES
Daily Progress Note     Admit Date: 10/17/2017  Bed/Room No.  1004/1004-01  Admitting Physician : Marshal Mclean MD  Code Status :Full Code  Hospital Day:  LOS: 7 days   Complaint at Admission :   Chief Complaint   Patient presents with    Shortness of Breath     worsening since wednesday, unlabored breathing, NAD, cardiac hx     Principal Problem:    Acute diastolic CHF (congestive heart failure) ([facility] [address]  Active Problems: Moderate mitral regurgitation    Paroxysmal atrial fibrillation (HCC)    Prolonged QT interval    Subjective: Interval History/Significant events :  10/24/17    Patient Status post surgery. POD # 1 , patient is extubated , doing OK, has nausea , fatigue , poor apeitie . Remains on NC for difficulty breathing and comfort . Chest tube in place . Vitals - Stable afebrile  Labs - stable , normal kidney function and lites. Nursing notes , Consults notes reviewed. Overnight events and updates discussed with Nursing staff . Background history    Admitted for Acute diastolic CHF (congestive heart failure) ([facility] [address] , in hospital for 7 days . [facility]   is admitted to the hospital for the management of  Acute diastolic CHF, moderate mitral regurgitation. Patient came to emergency room for shortness of breath. He reported that he has been having difficulty in breathing since May 2017. Patient followed with his cardiologist in July and had stress test and echocardiogram.  He was found to have moderate mitral regurgitation. Patient has underlying history of mitral valve replacement in February 2007. He has history of paroxysmal atrial fibrillation and has been on sotalol and atenolol. Patient had unsuccessful. Abrasion before. He also had Maze procedure performed during surgery 2007. Patient takes aspirin 81 mg daily. He reported cough, with clear sputum, orthopnea, PND.   Patient denied any palpitations although he reported that her heart rate has been more than Constitutional: Negative for activity change, appetite change, chills, fatigue, fever and unexpected weight change. HENT: Negative for congestion, mouth sores, postnasal drip, sinus pressure, sore throat and voice change. Eyes: Negative for visual disturbance. Respiratory: Negative for cough, shortness of breath and wheezing. Cardiovascular: Positive for chest pain and leg swelling. Negative for palpitations. Gastrointestinal: Positive for nausea. Negative for abdominal pain, blood in stool, constipation, diarrhea and vomiting. Endocrine: Negative for polyuria. Genitourinary: Negative for difficulty urinating, dysuria, frequency and urgency. Musculoskeletal: Negative for arthralgias, joint swelling and myalgias. Neurological: Negative for dizziness, tremors, speech difficulty, light-headedness and headaches.      Objective:   Current Vitals : Temp: 98.4 °F (36.9 °C),  Pulse: 83, Resp: 18, BP: 120/73, SpO2: 95 %  Last 24 Hrs Vitals   Patient Vitals for the past 24 hrs:   BP Temp Temp src Pulse Resp SpO2 Height Weight   10/24/17 0802 120/73 98.4 °F (36.9 °C) Oral 83 18 - - -   10/24/17 0700 - - - 79 - 95 % - -   10/24/17 0645 - - - 80 - 94 % - -   10/24/17 0630 - - - 79 - 95 % - -   10/24/17 0615 - - - 81 - 97 % - -   10/24/17 0600 - - - 81 - 95 % 5' 9\" (1.753 m) 218 lb 9.6 oz (99.2 kg)   10/24/17 0545 - - - 82 - 96 % - -   10/24/17 0530 - - - 85 - 95 % - -   10/24/17 0515 - - - 83 - 96 % - -   10/24/17 0500 - - - 81 - 96 % - -   10/24/17 0445 - - - 82 - 94 % - -   10/24/17 0430 - - - 83 - 95 % - -   10/24/17 0415 - - - 82 - - - -   10/24/17 0400 - - - 85 - 95 % - -   10/24/17 0345 - - - 82 - 95 % - -   10/24/17 0330 - - - 81 - 95 % - -   10/24/17 0315 - - - 81 - 96 % - -   10/24/17 0307 - - - - 18 98 % - -   10/24/17 0300 - - - 80 - 97 % - -   10/24/17 0245 - - - 80 - 96 % - -   10/24/17 0230 - - - 80 - 97 % - -   10/24/17 0215 - - - 80 - 97 % - -   10/24/17 0200 - - - 82 - 95 % - -   10/24/17 Eyes: Pupils are equal, round, and reactive to light. No scleral icterus. Neck: Neck supple. No JVD present. No tracheal deviation present. No thyromegaly present. R Subclavian in place    Cardiovascular: Normal rate, regular rhythm, normal heart sounds and intact distal pulses. Exam reveals no gallop and no friction rub. No murmur heard. Pulmonary/Chest: Effort normal and breath sounds normal. No respiratory distress. He has no wheezes. He has no rales. He exhibits no tenderness. Chest tube in place    Abdominal: Soft. Bowel sounds are normal. He exhibits no mass. There is no tenderness. There is no rebound and no guarding. Lymphadenopathy:     He has no cervical adenopathy. Neurological: He is alert and oriented to person, place, and time. No cranial nerve deficit. He exhibits normal muscle tone. Skin: Skin is warm. No rash noted. He is not diaphoretic. Psychiatric: He has a normal mood and affect. His behavior is normal.   Nursing note and vitals reviewed. Lower Extremities : No ankle Edema , No calf Tenderness     Laboratory findings:    Recent Labs      10/21/17   1121   10/23/17   1330   10/23/17   1728  10/23/17   2004  10/23/17   2005  10/23/17   2335  10/24/17   0451   WBC   --    < >   --    < >  30.6*   --    --   24.3*  12.9*   HGB   --    < >   --    < >  11.3*  10.9*   --   10.0*  8.0*   HCT   --    < >   --    < >  33.5*  32.3*   --   29.6*  23.7*   PLT   --    < >  120*   < >  112*  110*   --   111*  74*   INR  1.0   --   1.7   --    --    --   1.2   --    --     < > = values in this interval not displayed.      Recent Labs      10/23/17   1728  10/23/17   2333  10/24/17   0154  10/24/17   0451   NA  144  145*   --   145*   K  3.6*  4.0  4.5  4.4   CL  108*  110*   --   113*   CO2  18*  21   --   24   GLUCOSE  204*  191*   --   84   BUN  21*  21*   --   22*   CREATININE  1.16  1.00   --   0.89   MG  2.8*  2.2   --   2.2   CALCIUM  7.3*  7.5*   --   7.9*   CAION  1.09*  1.10* --   1.16      Imaging/Diagonstics:     CT chest pulmonary embolism 10/17/17 - no central or proximal segmental pulmonary embolus, mild to moderate CHF with small pleural effusion and pulmonary edema, 5 mm  lower lobe nodule.      EKG 10/17/17- sinus tachycardia    ALEENA - 10/18/17  Bioprosthetic mitral valve is seen. Thickened leaflets, severe mitral regurgitation with multiple jests seen. High velocity across the valve with mean gradient of 15 mm Hg suggestive of dysfunctional bioprosthetic mitral valve. Mild aortic and tricuspid regurgitation. Reduced left ventricular systolic function. No PFO seen by color Doppler. Cardiac cath 10/19/17   Severe 4+ mitral regurgitation.   Minimal CAD.   Normal LV systolic function.   Mildly dilated aortic root with mild AI.   Normal right heart pressures. Clinical Course : gradually improving  Assessment and Plan      1. Severe mitral valve regurgitation status post mechanical valve replacement on 10/23/17. Patient had previous bioprosthetic valve replacement on February 2017 . Patient will need long-term anticoagulation. On coumadin . Will check with CTS  for bridging . heparin Vs lovenox   2. Acute diastolic CHF-  Agree with lasix . 3. PAF - status post Razo maze procedure during surgery. Patient having V. tach, bigeminy postop. Amiodarone . In NSR . 4. Cardiac shock-on epinephrine, Levophed, vasopressin. Resolved . 5. QTc prolongation -sotalol discontinued. Postop management by CT surgery. Continue Godinez catheter. Check CBC tomorrow. Continue to monitor vitals , Intake / output ,  Cell count , HGB , Kidney function, oxygenation  as indicated . Wife  at bedside. Discussed with patient .    Plan discussed with Staff Zenia Oleary and Ya Alberto RN     (Please note that portions of this note were completed with a voice recognition program. Efforts were made to edit the dictations but occasionally words are mis-transcribed.)      Aissatou Casanova

## 2017-10-25 ENCOUNTER — APPOINTMENT (OUTPATIENT)
Dept: GENERAL RADIOLOGY | Age: 57
DRG: 216 | End: 2017-10-25
Payer: COMMERCIAL

## 2017-10-25 LAB
ANION GAP SERPL CALCULATED.3IONS-SCNC: 8 MMOL/L (ref 9–17)
BLD PROD TYP BPU: NORMAL
BUN BLDV-MCNC: 28 MG/DL (ref 6–20)
BUN/CREAT BLD: ABNORMAL (ref 9–20)
CALCIUM SERPL-MCNC: 8.2 MG/DL (ref 8.6–10.4)
CHLORIDE BLD-SCNC: 104 MMOL/L (ref 98–107)
CO2: 26 MMOL/L (ref 20–31)
CREAT SERPL-MCNC: 1.01 MG/DL (ref 0.7–1.2)
DISPENSE STATUS BLOOD BANK: NORMAL
EKG ATRIAL RATE: 0 BPM
EKG ATRIAL RATE: 83 BPM
EKG ATRIAL RATE: 85 BPM
EKG Q-T INTERVAL: 430 MS
EKG Q-T INTERVAL: 432 MS
EKG Q-T INTERVAL: 438 MS
EKG QRS DURATION: 144 MS
EKG QTC CALCULATION (BAZETT): 505 MS
EKG QTC CALCULATION (BAZETT): 507 MS
EKG QTC CALCULATION (BAZETT): 514 MS
EKG R AXIS: 61 DEGREES
EKG R AXIS: 67 DEGREES
EKG R AXIS: 99 DEGREES
EKG T AXIS: -25 DEGREES
EKG T AXIS: 7 DEGREES
EKG T AXIS: 8 DEGREES
EKG VENTRICULAR RATE: 83 BPM
GFR AFRICAN AMERICAN: >60 ML/MIN
GFR NON-AFRICAN AMERICAN: >60 ML/MIN
GFR SERPL CREATININE-BSD FRML MDRD: ABNORMAL ML/MIN/{1.73_M2}
GFR SERPL CREATININE-BSD FRML MDRD: ABNORMAL ML/MIN/{1.73_M2}
GLUCOSE BLD-MCNC: 112 MG/DL (ref 70–99)
GLUCOSE BLD-MCNC: 115 MG/DL (ref 75–110)
GLUCOSE BLD-MCNC: 116 MG/DL (ref 75–110)
GLUCOSE BLD-MCNC: 118 MG/DL (ref 75–110)
GLUCOSE BLD-MCNC: 119 MG/DL (ref 75–110)
GLUCOSE BLD-MCNC: 127 MG/DL (ref 75–110)
GLUCOSE BLD-MCNC: 136 MG/DL (ref 75–110)
GLUCOSE BLD-MCNC: 139 MG/DL (ref 75–110)
GLUCOSE BLD-MCNC: 168 MG/DL (ref 75–110)
HCT VFR BLD CALC: 23.7 % (ref 41–53)
HEMOGLOBIN: 7.7 G/DL (ref 13.5–17.5)
INR BLD: 1
MAGNESIUM: 2.4 MG/DL (ref 1.6–2.6)
MCH RBC QN AUTO: 28.1 PG (ref 26–34)
MCHC RBC AUTO-ENTMCNC: 32.6 G/DL (ref 31–37)
MCV RBC AUTO: 86.1 FL (ref 80–100)
PDW BLD-RTO: 15 % (ref 12.5–15.4)
PLATELET # BLD: 103 K/UL (ref 140–450)
PMV BLD AUTO: 10.3 FL (ref 6–12)
POTASSIUM SERPL-SCNC: 4.5 MMOL/L (ref 3.7–5.3)
PROTHROMBIN TIME: 11.1 SEC (ref 9.4–12.6)
RBC # BLD: 2.76 M/UL (ref 4.5–5.9)
SODIUM BLD-SCNC: 138 MMOL/L (ref 135–144)
SURGICAL PATHOLOGY REPORT: NORMAL
TRANSFUSION STATUS: NORMAL
UNIT DIVISION: 0
UNIT NUMBER: NORMAL
WBC # BLD: 15 K/UL (ref 3.5–11)

## 2017-10-25 PROCEDURE — 99024 POSTOP FOLLOW-UP VISIT: CPT | Performed by: PHYSICIAN ASSISTANT

## 2017-10-25 PROCEDURE — 85027 COMPLETE CBC AUTOMATED: CPT

## 2017-10-25 PROCEDURE — 6370000000 HC RX 637 (ALT 250 FOR IP): Performed by: THORACIC SURGERY (CARDIOTHORACIC VASCULAR SURGERY)

## 2017-10-25 PROCEDURE — 2500000003 HC RX 250 WO HCPCS: Performed by: THORACIC SURGERY (CARDIOTHORACIC VASCULAR SURGERY)

## 2017-10-25 PROCEDURE — 36415 COLL VENOUS BLD VENIPUNCTURE: CPT

## 2017-10-25 PROCEDURE — 80048 BASIC METABOLIC PNL TOTAL CA: CPT

## 2017-10-25 PROCEDURE — 99232 SBSQ HOSP IP/OBS MODERATE 35: CPT | Performed by: FAMILY MEDICINE

## 2017-10-25 PROCEDURE — 2580000003 HC RX 258: Performed by: THORACIC SURGERY (CARDIOTHORACIC VASCULAR SURGERY)

## 2017-10-25 PROCEDURE — 6360000002 HC RX W HCPCS: Performed by: THORACIC SURGERY (CARDIOTHORACIC VASCULAR SURGERY)

## 2017-10-25 PROCEDURE — 2140000001 HC CVICU INTERMEDIATE R&B

## 2017-10-25 PROCEDURE — 97535 SELF CARE MNGMENT TRAINING: CPT

## 2017-10-25 PROCEDURE — 82947 ASSAY GLUCOSE BLOOD QUANT: CPT

## 2017-10-25 PROCEDURE — S0028 INJECTION, FAMOTIDINE, 20 MG: HCPCS | Performed by: THORACIC SURGERY (CARDIOTHORACIC VASCULAR SURGERY)

## 2017-10-25 PROCEDURE — 94762 N-INVAS EAR/PLS OXIMTRY CONT: CPT

## 2017-10-25 PROCEDURE — 97110 THERAPEUTIC EXERCISES: CPT

## 2017-10-25 PROCEDURE — 97116 GAIT TRAINING THERAPY: CPT

## 2017-10-25 PROCEDURE — 71010 XR CHEST PORTABLE: CPT

## 2017-10-25 PROCEDURE — 85610 PROTHROMBIN TIME: CPT

## 2017-10-25 PROCEDURE — 83735 ASSAY OF MAGNESIUM: CPT

## 2017-10-25 PROCEDURE — 93005 ELECTROCARDIOGRAM TRACING: CPT

## 2017-10-25 RX ORDER — FAMOTIDINE 20 MG/1
20 TABLET, FILM COATED ORAL 2 TIMES DAILY
Status: DISCONTINUED | OUTPATIENT
Start: 2017-10-25 | End: 2017-10-30 | Stop reason: HOSPADM

## 2017-10-25 RX ORDER — WARFARIN SODIUM 5 MG/1
5 TABLET ORAL DAILY
Status: DISCONTINUED | OUTPATIENT
Start: 2017-10-25 | End: 2017-10-29

## 2017-10-25 RX ORDER — FERROUS SULFATE 325(65) MG
325 TABLET ORAL 2 TIMES DAILY WITH MEALS
Status: DISCONTINUED | OUTPATIENT
Start: 2017-10-25 | End: 2017-10-27

## 2017-10-25 RX ADMIN — OXYCODONE HYDROCHLORIDE AND ACETAMINOPHEN 2 TABLET: 5; 325 TABLET ORAL at 20:07

## 2017-10-25 RX ADMIN — KETOROLAC TROMETHAMINE 15 MG: 15 INJECTION, SOLUTION INTRAMUSCULAR; INTRAVENOUS at 08:41

## 2017-10-25 RX ADMIN — AMIODARONE HYDROCHLORIDE 200 MG: 200 TABLET ORAL at 08:40

## 2017-10-25 RX ADMIN — ENOXAPARIN SODIUM 100 MG: 100 INJECTION SUBCUTANEOUS at 20:11

## 2017-10-25 RX ADMIN — Medication 10 ML: at 20:09

## 2017-10-25 RX ADMIN — FAMOTIDINE 20 MG: 10 INJECTION, SOLUTION INTRAVENOUS at 08:40

## 2017-10-25 RX ADMIN — KETOROLAC TROMETHAMINE 15 MG: 15 INJECTION, SOLUTION INTRAMUSCULAR; INTRAVENOUS at 00:36

## 2017-10-25 RX ADMIN — Medication 15 ML: at 08:41

## 2017-10-25 RX ADMIN — FUROSEMIDE 20 MG: 10 INJECTION, SOLUTION INTRAVENOUS at 08:40

## 2017-10-25 RX ADMIN — CITALOPRAM 5 MG: 10 TABLET, FILM COATED ORAL at 08:40

## 2017-10-25 RX ADMIN — ENOXAPARIN SODIUM 100 MG: 100 INJECTION SUBCUTANEOUS at 10:03

## 2017-10-25 RX ADMIN — WARFARIN SODIUM 5 MG: 5 TABLET ORAL at 18:51

## 2017-10-25 RX ADMIN — AMIODARONE HYDROCHLORIDE 200 MG: 200 TABLET ORAL at 20:08

## 2017-10-25 RX ADMIN — FAMOTIDINE 20 MG: 20 TABLET, FILM COATED ORAL at 20:13

## 2017-10-25 RX ADMIN — FUROSEMIDE 20 MG: 10 INJECTION, SOLUTION INTRAVENOUS at 18:24

## 2017-10-25 RX ADMIN — DIPHENHYDRAMINE HCL 25 MG: 25 TABLET ORAL at 20:07

## 2017-10-25 RX ADMIN — SIMVASTATIN 20 MG: 20 TABLET, FILM COATED ORAL at 20:08

## 2017-10-25 RX ADMIN — Medication 10 ML: at 08:49

## 2017-10-25 RX ADMIN — DIPHENHYDRAMINE HCL 25 MG: 25 TABLET ORAL at 00:58

## 2017-10-25 RX ADMIN — ASPIRIN 81 MG: 81 TABLET, COATED ORAL at 08:40

## 2017-10-25 RX ADMIN — FENTANYL CITRATE 50 MCG: 50 INJECTION INTRAMUSCULAR; INTRAVENOUS at 00:54

## 2017-10-25 RX ADMIN — MULTIPLE VITAMINS W/ MINERALS TAB 1 TABLET: TAB at 08:39

## 2017-10-25 ASSESSMENT — PAIN DESCRIPTION - ORIENTATION: ORIENTATION: MID

## 2017-10-25 ASSESSMENT — PAIN DESCRIPTION - LOCATION
LOCATION: STERNUM
LOCATION: CHEST;INCISION
LOCATION: STERNUM;INCISION

## 2017-10-25 ASSESSMENT — PAIN SCALES - GENERAL
PAINLEVEL_OUTOF10: 1
PAINLEVEL_OUTOF10: 2
PAINLEVEL_OUTOF10: 7
PAINLEVEL_OUTOF10: 2
PAINLEVEL_OUTOF10: 6
PAINLEVEL_OUTOF10: 2
PAINLEVEL_OUTOF10: 6

## 2017-10-25 ASSESSMENT — ENCOUNTER SYMPTOMS
ABDOMINAL PAIN: 0
WHEEZING: 0
VOICE CHANGE: 0
VOMITING: 0
DIARRHEA: 0
SINUS PRESSURE: 0
CONSTIPATION: 0
SORE THROAT: 0
SHORTNESS OF BREATH: 0
COUGH: 0
BLOOD IN STOOL: 0
NAUSEA: 1

## 2017-10-25 ASSESSMENT — PAIN DESCRIPTION - PAIN TYPE
TYPE: ACUTE PAIN;SURGICAL PAIN
TYPE: SURGICAL PAIN;ACUTE PAIN
TYPE: ACUTE PAIN;SURGICAL PAIN
TYPE: SURGICAL PAIN

## 2017-10-25 NOTE — PROGRESS NOTES
[facility] [address]  Occupational Therapy Not Seen Note    Patient not available for Occupational Therapy due to:    [] Testing:    [] Hemodialysis    [] Blood Transfusion in Progress    []Refusal by Patient:    [] Surgery/Procedure:    [] Strict Bedrest    [] Sedation    [] Spine Precautions     [] Pt being transferred to palliative care at this time. Spoke with pt/family and OT services to be defered. [] Pt independent with functional mobility and functional tasks. Pt with no OT acute care needs at this time, will defer OT eval.    [x] Other: Pt eating breakfast @0830, PT w/pt @0950am    Next Scheduled Treatment: Later this date.     Signature: BUNNY Calvert/MEENU

## 2017-10-25 NOTE — PROGRESS NOTES
able to continue with treatment    Orientation  Orientation  Overall Orientation Status: Within Functional Limits  Objective      Transfers  Sit to Stand: Minimal Assistance  Stand to sit: Minimal Assistance  Ambulation  Ambulation?: Yes  Ambulation 1  Surface: level tile  Device: Rolling Walker  Assistance: Minimal assistance  Quality of Gait: VERY slow edward with standing rest breaks often. flexed posture improved with cueing. verbal and demonstration cues on deep breathing  Distance: 50' x6  Comments: Pt cannot walk any distance without assistance  Stairs/Curb  Stairs?: No     Balance  Posture: Fair  Sitting - Static: Good  Sitting - Dynamic: Fair;+  Standing - Static: Fair  Standing - Dynamic: Fair                           Assessment   Body structures, Functions, Activity limitations: Decreased functional mobility ; Decreased balance;Decreased endurance  Assessment: amb 50' x6 with frequent standing rest breaks splinting it up. Pt limited by fatigue, endurnace. home assist prn, CTA pending progress  Prognosis: Good  REQUIRES PT FOLLOW UP: Yes  Activity Tolerance  Activity Tolerance: Patient limited by endurance; Patient limited by fatigue       Discharge Recommendations:  Continue to assess pending progress, Home with assist PRN    G-Code     OutComes Score                                                  Pt educated on cardiac packet handout and all questions answered. [facility].      Goals  Short term goals  Time Frame for Short term goals: 20 visits  Short term goal 1: Pt will be I  with bed mobility  Short term goal 2: Pt will be I with transfers  Short term goal 3: Pt will be I with amb 300' without AD or Brenna with least restrictive AD    Plan    Plan  Times per week: BID 7x/wk  Current Treatment Recommendations: Strengthening, Endurance Training, Balance Training, Functional Mobility Training, Transfer Training, Gait Training, Stair training,

## 2017-10-25 NOTE — PROGRESS NOTES
Daily Progress Note     Admit Date: 10/17/2017  Bed/Room No.  1004/1004-01  Admitting Physician : Estrellita Peralta MD  Code Status :Full Code  Hospital Day:  LOS: 8 days   Complaint at Admission :   Chief Complaint   Patient presents with    Shortness of Breath     worsening since wednesday, unlabored breathing, NAD, cardiac hx     Principal Problem:    Acute diastolic CHF (congestive heart failure) ([facility] [address]  Active Problems: Moderate mitral regurgitation    Paroxysmal atrial fibrillation (HCC)    Prolonged QT interval    Subjective: Interval History/Significant events :  10/25/17    Patient POD # 2. Patient still has chest tube in place. Had first degree AV block today. Has internal pacer in place. Blood pressure stable. Continues to have  generalized anasarca. Remains on supplemental oxygen. Patient has poor appetite. Vitals - Stable afebrile  Labs - stable , normal kidney function and lites. Nursing notes , Consults notes reviewed. Overnight events and updates discussed with Nursing staff . Background history    Admitted for Acute diastolic CHF (congestive heart failure) ([facility] [place] , in hospital for 8 days . Carlito Chase 61 y.o. male   is admitted to the hospital for the management of  Acute diastolic CHF, moderate mitral regurgitation. Patient came to emergency room for shortness of breath. He reported that he has been having difficulty in breathing since May 2017. Patient followed with his cardiologist in July and had stress test and echocardiogram.  He was found to have moderate mitral regurgitation. Patient has underlying history of mitral valve replacement in February 2007. He has history of paroxysmal atrial fibrillation and has been on sotalol and atenolol. Patient had unsuccessful. Abrasion before. He also had Maze procedure performed during surgery 2007. Patient takes aspirin 81 mg daily. He reported cough, with clear sputum, orthopnea, PND.   Patient denied any palpitations chills, fatigue, fever and unexpected weight change. HENT: Negative for congestion, mouth sores, postnasal drip, sinus pressure, sore throat and voice change. Eyes: Negative for visual disturbance. Respiratory: Negative for cough, shortness of breath and wheezing. Cardiovascular: Positive for chest pain and leg swelling. Negative for palpitations. Gastrointestinal: Positive for nausea. Negative for abdominal pain, blood in stool, constipation, diarrhea and vomiting. Endocrine: Negative for polyuria. Genitourinary: Negative for difficulty urinating, dysuria, frequency and urgency. Musculoskeletal: Negative for arthralgias, joint swelling and myalgias. Neurological: Negative for dizziness, tremors, speech difficulty, light-headedness and headaches.      Objective:   Current Vitals : Temp: 97.9 °F (36.6 °C),  Pulse: 76, Resp: 16, BP: 105/66, SpO2: 98 %  Last 24 Hrs Vitals   Patient Vitals for the past 24 hrs:   BP Temp Temp src Pulse Resp SpO2 Height Weight   10/25/17 0722 105/66 97.9 °F (36.6 °C) Oral 76 16 98 % - -   10/25/17 0600 - - - - - - 5' 9\" (1.753 m) 228 lb 13.4 oz (103.8 kg)   10/25/17 0427 107/69 98.2 °F (36.8 °C) Oral 80 16 99 % - -   10/25/17 0032 118/69 98.1 °F (36.7 °C) Oral 87 17 98 % - -   10/24/17 1919 96/66 98 °F (36.7 °C) Oral - 16 93 % - -   10/24/17 1800 127/71 - - 86 - 96 % - -   10/24/17 1700 120/71 - - 84 - 98 % - -   10/24/17 1600 112/68 98.1 °F (36.7 °C) Oral 83 18 97 % - -   10/24/17 1500 116/68 - - 84 - - - -   10/24/17 1400 - - - 88 - - - -   10/24/17 1300 (!) 144/84 - - 90 - - - -   10/24/17 1200 132/88 98.4 °F (36.9 °C) Oral 83 18 95 % - -   10/24/17 1100 119/71 - - 84 - 96 % - -   10/24/17 1030 (!) 117/58 - - 84 - - - -   10/24/17 1000 102/68 - - 83 - 95 % - -   10/24/17 0950 108/68 - - 85 - - - -   10/24/17 0945 106/61 - - 83 - - - -   10/24/17 0940 106/60 - - 84 - - - -   10/24/17 0935 106/64 - - 85 - - - -   10/24/17 0930 (!) 99/51 - - 86 - - - -   10/24/17 0323 (!) 97/58 - - 85 - - - -   10/24/17 0920 (!) 101/57 - - 85 - - - -   10/24/17 0915 (!) 99/56 - - 85 - - - -   10/24/17 0910 (!) 100/55 - - 82 - - - -   10/24/17 0905 (!) 100/59 - - 82 - - - -   10/24/17 0900 - - - 82 - 94 % - -     Intake / output   10/24 0701 - 10/25 0700  In: 2248 [P.O.:600; I.V.:1398]  Out: 2940 [Urine:2310]  Physical Exam:  Physical Exam   Constitutional: He is oriented to person, place, and time. He appears well-developed and well-nourished. No distress. HENT:   Mouth/Throat: Oropharynx is clear and moist. No oropharyngeal exudate. Eyes: Pupils are equal, round, and reactive to light. No scleral icterus. Neck: Neck supple. No JVD present. No tracheal deviation present. No thyromegaly present. R Subclavian in place    Cardiovascular: Normal rate, regular rhythm, normal heart sounds and intact distal pulses. Exam reveals no gallop and no friction rub. No murmur heard. Pulmonary/Chest: Effort normal and breath sounds normal. No respiratory distress. He has no wheezes. He has no rales. He exhibits no tenderness. Chest tube in place    Abdominal: Soft. Bowel sounds are normal. He exhibits no mass. There is no tenderness. There is no rebound and no guarding. Lymphadenopathy:     He has no cervical adenopathy. Neurological: He is alert and oriented to person, place, and time. No cranial nerve deficit. He exhibits normal muscle tone. Skin: Skin is warm. No rash noted. He is not diaphoretic. Psychiatric: He has a normal mood and affect. His behavior is normal.   Nursing note and vitals reviewed.     Lower Extremities : No ankle Edema , No calf Tenderness     Laboratory findings:    Recent Labs      10/23/17   2005  10/23/17   2335  10/24/17   0451  10/24/17   1019  10/25/17   0452   WBC   --   24.3*  12.9*   --   15.0*   HGB   --   10.0*  8.0*   --   7.7*   HCT   --   29.6*  23.7*   --   23.7*   PLT   --   111*  74*   --   103*   INR  1.2   --    --   1.2  1.0     Recent Labs 10/23/17   1728  10/23/17   2333  10/24/17   0154  10/24/17   0451  10/25/17   0452   NA  144  145*   --   145*  138   K  3.6*  4.0  4.5  4.4  4.5   CL  108*  110*   --   113*  104   CO2  18*  21   --   24  26   GLUCOSE  204*  191*   --   84  112*   BUN  21*  21*   --   22*  28*   CREATININE  1.16  1.00   --   0.89  1.01   MG  2.8*  2.2   --   2.2  2.4   CALCIUM  7.3*  7.5*   --   7.9*  8.2*   CAION  1.09*  1.10*   --   1.16   --       Imaging/Diagonstics:     CT chest pulmonary embolism 10/17/17 - no central or proximal segmental pulmonary embolus, mild to moderate CHF with small pleural effusion and pulmonary edema, 5 mm  lower lobe nodule.      EKG 10/17/17- sinus tachycardia    ALEENA - 10/18/17  Bioprosthetic mitral valve is seen. Thickened leaflets, severe mitral regurgitation with multiple jests seen. High velocity across the valve with mean gradient of 15 mm Hg suggestive of dysfunctional bioprosthetic mitral valve. Mild aortic and tricuspid regurgitation. Reduced left ventricular systolic function. No PFO seen by color Doppler. Cardiac cath 10/19/17   Severe 4+ mitral regurgitation.   Minimal CAD.   Normal LV systolic function.   Mildly dilated aortic root with mild AI.   Normal right heart pressures. Clinical Course : gradually improving  Assessment and Plan      1. Severe mitral valve regurgitation status post mechanical valve replacement on 10/23/17. Patient had previous bioprosthetic valve replacement on February 2017 . Patient will need long-term anticoagulation. On coumadin . Plan lovenox for bridging when OK with CT surgery    2. Acute diastolic CHF-  Continue lasix . 3. PAF - status post Razo maze procedure during surgery. On amiodarone for [facility] . 4. Ist degree AV block -    5. Cardiac shock-on epinephrine, Levophed, vasopressin. Resolved . 6. QTc prolongation -sotalol discontinued - normal electrolytes and mag . 7. Anemia secondary to acute blood loss .  May need transfusion , continue monitor . Add ferrous sulphate supplement . 8. Post Op thrombocytopenia - improving . HIT negative . Postop management by CT surgery. Plan for Chest tube removal today   Godinez catheter. Monitor Lytes . Continue to monitor vitals , Intake / output ,  Cell count , HGB , Kidney function, oxygenation  as indicated . Wife  at bedside. Discussed with patient .    Plan discussed with Staff Halley NUNEZ     (Please note that portions of this note were completed with a voice recognition program. Efforts were made to edit the dictations but occasionally words are mis-transcribed.)      Donaldo Holder MD  10/25/2017

## 2017-10-25 NOTE — PROGRESS NOTES
Physical Therapy  Facility/Department: [facility]  Daily Treatment Note  NAME: Cristo Zuniga  : 1960  MRN: [id number]    Date of Service: 10/25/2017    Patient Diagnosis(es):   Patient Active Problem List    Diagnosis Date Noted    Acute diastolic CHF (congestive heart failure) ([facility] [address] 10/17/2017    Moderate mitral regurgitation 10/17/2017    [facility]    Prolonged QT interval 10/17/2017       Past Medical History:   Diagnosis Date    Acute diastolic CHF (congestive heart failure) ([facility] 10/17/2017     Past Surgical History:   Procedure Laterality Date    CARDIAC CATHETERIZATION  10/19/2017    right and left heart cath Dr. Zain Singh  2007    MITRAL VALVE REPLACEMENT N/A 10/23/2017    REDO STERNOTOMY, MITRAL VALVE REPLACEMENT 27MM MEDTRONIC OPEN PIVOT MECHANICAL, ON PUMP, SWAN, ALEENA performed by Flory Irving MD at [address] [place] [address] TRANSESOPHAGEAL ECHOCARDIOGRAM  10-18-19       Restrictions  Restrictions/Precautions  Restrictions/Precautions: (P) Cardiac, General Precautions, Surgical Protocols, Fall Risk  Required Braces or Orthoses?: (P) No  Position Activity Restriction  Sternal Precautions: (P) No Pushing, No Pulling, 5# Lifting Restrictions  Sternal Precautions: (P) MVR w/Re-do Sternotomy 10/23/17  Other position/activity restrictions: (P) Chest tubes, IV  Subjective   General  Chart Reviewed: Yes  Response To Previous Treatment: Patient with no complaints from previous session. Family / Caregiver Present: Yes (wife)  Subjective  Subjective: RN and pt agreeable to PT. Pt alert in bed upon arrival. Pt states he just woke up. Pain Screening  Patient Currently in Pain: Yes  Pain Assessment  Pain Assessment: 0-10  Pain Level: 6  Pain Type: Acute pain;Surgical pain  Pain Location: Sternum; Incision  Pain Intervention(s): Emotional support; Ambulation/Increased activity  Response to Pain Intervention: Patient Satisfied  Vital Signs  Patient Currently in Pain: Yes  Pre Treatment Pain Screening  Intervention List: Patient able to continue with treatment    Orientation  Orientation  Overall Orientation Status: Within Functional Limits  Objective   Bed mobility  Supine to Sit: Minimal assistance  Scooting: Minimal assistance  Transfers  Sit to Stand: Minimal Assistance  Stand to sit: Minimal Assistance  Ambulation  Ambulation?: Yes  Ambulation 1  Surface: level tile  Device: Rolling Walker  Assistance: Contact guard assistance  Quality of Gait: much improved edward from am, still slow. 1 rest break taken standing  Distance: 150' x2  Comments: 2L 02, w/c follow  Stairs/Curb  Stairs?: No     Balance  Posture: Fair  Sitting - Static: Good  Sitting - Dynamic: Good  Standing - Static: Good;-  Standing - Dynamic: Fair;+  Comments: RW for standing balance      Exercises:  Comment: 20x Bilat seated: LAQ, marche, ankle rocks, shld scaption. Assessment   Body structures, Functions, Activity limitations: Decreased functional mobility ; Decreased balance;Decreased endurance  Assessment: amb 150' x2 RW CGA w/c follow 2L 02. Pt limited by fatigue/ endurance. home assist prn  Prognosis: Good  REQUIRES PT FOLLOW UP: Yes  Activity Tolerance  Activity Tolerance: Patient limited by endurance; Patient limited by fatigue       Discharge Recommendations:  Home with assist PRN    G-Code     OutComes Score                                                      Goals  Short term goals  Time Frame for Short term goals: 20 visits  Short term goal 1: Pt will be I  with bed mobility  Short term goal 2: Pt will be I with transfers  Short term goal 3: Pt will be I with amb 300' without AD or Brenna with least restrictive AD    Plan    Plan  Times per week: BID 7x/wk  Current Treatment Recommendations: Strengthening, Endurance Training, Balance Training, Functional Mobility Training, Transfer Training, Gait Training, Stair training, Home Exercise Program, Safety Education & Training, Patient/Caregiver Education & Training, Equipment Evaluation, Education, & procurement  Safety Devices  Type of devices: Call light within reach, Patient at risk for falls, Gait belt, Left in chair, Nurse notified  Restraints  Initially in place: No     Therapy Time   Individual Concurrent Group Co-treatment   Time In 1337         Time Out 1413         Minutes [address] [place], [place]

## 2017-10-25 NOTE — PROGRESS NOTES
Infusions:   insulin (HUMAN R) non-weight based infusion 0.67 Units/hr (10/25/17 0615)    sodium chloride 25 mL/hr at 10/24/17 1838    hetastarch 6% in 0.9% NaCl infusion 500 mL      dextrose       PRN Meds:metoclopramide, sodium chloride flush, calcium chloride IVPB, magnesium sulfate, potassium chloride, acetaminophen, fentanNYL, diphenhydrAMINE, magnesium hydroxide, ondansetron, hydrALAZINE, hetastarch 6% in 0.9% NaCl infusion 500 mL, glucose, dextrose, glucagon (rDNA), dextrose, oxyCODONE-acetaminophen    Beta- Blocker: No  Aspirin: Yes  Lovenox: No  GI: Yes  Plavix: No  Coumadin: Yes  Statin: Yes  ACE: No, EF > 40%    Assessment/ Plan:  POD # 2, S/P: Redo mitral valve mechanical  HD afib on amiodarone BP soft  Start Lovenox mg/kg, Coumadin 5mg today, INR daily  D/C riggs and tubes  Ambulate        MICHAEL Laguerre

## 2017-10-25 NOTE — PROGRESS NOTES
for input(s): TROPONINI in the last 72 hours. BNP: No results for input(s): BNP in the last 72 hours. Lipids:   No results for input(s): CHOL, HDL in the last 72 hours. Invalid input(s): LDLCALCU  INR:   Recent Labs      10/23/17   2005  10/24/17   1019  10/25/17   0452   INR  1.2  1.2  1.0       STRESS 7/27/17: Small inferior infarct. EF 50%. WMA.      TTE 7/27/17: EF 50%. Segmental WMA. Bioprosthetic MV has evidence of stenosis. ALEENA on 10/18/17:  Bioprosthetic mitral valve is seen. Thickened leaflets, severe mitral regurgitation with multiple jests seen. High velocity across the valve with mean gradient of 15 mm Hg suggestive of dysfunctional bioprosthetic mitral valve. Mild aortic and tricuspid regurgitation. Reduced left ventricular systolic function. No PFO seen by color Doppler. Condition discussed with family and patient, consider CT surgery evaluation. Will need RHC and zohreh nary angiography before any anticipated valve redo surgery. No complications    CARDIAC CATH 10/19/17  Severe 4+ mitral regurgitation  Minimal CAD  Normal LV systolic function  Mildly dilated aortic root with mild AI  Normal right heart pressures    MVR & Razo-Maze (10/23/17): Redo MVR with 27 mm ATS mechanical prosthesis  Bi-atrial Razo-Maze IV   Intra-op LVEF 55%    Objective:   Vitals: /66   Pulse 76   Temp 97.9 °F (36.6 °C) (Oral)   Resp 16   Ht 5' 9\" (1.753 m)   Wt 228 lb 13.4 oz (103.8 kg)   SpO2 98%   BMI 33.79 kg/m²   General appearance: alert and cooperative with exam  HEENT: Head: Normocephalic, no lesions, without obvious abnormality. Neck: no JVD, trachea midline, no adenopathy  Lungs: Clear to auscultation  Heart: regular rhythm with normal rate, s1/s2 auscultated, +3/6 murmur left axillary region. Abdomen: soft, non-tender, bowel sounds active  Extremities: no edema.  Right groin soft, no drainage, no hematoma, no ecchymosis   Neurologic: not done      Assessment / Acute Cardiac Problems: 1. Severe Mitral Regurgitation s/p redo MVR with 27 mm ATS mechanical prosthesis and Bi-atrial Razo-Maze IV (including ALYCE closure) on 10/23/17  2. 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 Preserved LVEF  4. Paroxysmal A. Fib with RVR (Chads2-Vasc score 1), on Sotalol at home  5. H/O A. Fib ablation  6. Complete heart block with junctional escape rhythm requiring A-sensing V-pacing  7. Post op thrombocytopenia  8. Post op blood loss anemia  9. Essential HTN    Plan of Treatment:   1. Routine post op care per CTS  2. Continue Aspirin, Simvastatin, Amiodarone  3. Continue IV Lasix 20mg BID  4. Platelet count recovering now, HIT negative  5. Warfarin started by CTS (INR 1.0 this AM), goal INR 2.5 - 3.5. Bridging with therapeutic lovenox  6. Underlying complete heart block with junctional escape rhythm requiring A-sensing V-pacing  7. Will need eval for permanent pacer  8. Monitor labs    Will discuss with rounding attending Dr. Avel Felton for final recommendations. Denice Lazo MD   Fellow, [address]     I performed a history and physical examination of the patient and discussed management with the resident. I reviewed the residents note and agree with the documented findings and plan of care. Any areas of disagreement are noted on the chart. I was personally present for the key portions of any procedures. I have documented in the chart those procedures where I was not present during the key portions. I have personally evaluated this patient and have completed at least one if not all key elements of the E/M (history, physical exam, and MDM). Additional findings are as noted. EP consult.   Frandy Monk MD

## 2017-10-25 NOTE — PROGRESS NOTES
Occupational Therapy  Facility/Department: [facility] CAR 1  Daily Treatment Note  NAME: Jules Fuentes  : 1960  MRN: [id number]    Date of Service: 10/25/2017    Patient Diagnosis(es): The encounter diagnosis was Acute congestive heart failure, unspecified congestive heart failure type ([facility] [address] has a past medical history of Acute diastolic CHF (congestive heart failure) ([facility] [address] has a past surgical history that includes Mitral valve replacement (2007); transesophageal echocardiogram (10-18-19); Cardiac catheterization (10/19/2017); and Mitral valve replacement (N/A, 10/23/2017). Restrictions  Restrictions/Precautions  Restrictions/Precautions: Cardiac, General Precautions, Surgical Protocols, Fall Risk  Required Braces or Orthoses?: No  Position Activity Restriction  Sternal Precautions: No Pushing, No Pulling, 5# Lifting Restrictions  Sternal Precautions: MVR w/Re-do Sternotomy 10/23/17  Other position/activity restrictions: Chest tubes, IV  Subjective   General  Patient assessed for rehabilitation services?: Yes  Family / Caregiver Present: Yes (wife)  Pain Assessment  Patient Currently in Pain: Yes  Pain Assessment: 0-10  Pain Level: 1  Pain Type: Surgical pain  Pain Location: Sternum  Pain Orientation: Mid  Pain Intervention(s): Distraction  Response to Pain Intervention: Patient Satisfied   Objective    ADL  Feeding: Independent  Grooming: Stand by assistance;Setup  Toileting: Maximum assistance (riggs cath)     Balance  Sitting Balance: Supervision (seated in chair )       Assessment   Performance deficits / Impairments: Decreased functional mobility ; Decreased endurance;Decreased ADL status  Treatment Diagnosis: MVR w/Sternotomy  Prognosis: Good  Patient Education: Educ on AE/DME, EC/WS tech, Fall Prev, Sternal Prec, Safety with func mob and alds. Issued written info and pt verbalized understanding.   Discharge Recommendations: Home with assist PRN;Continue to assess pending progress  REQUIRES OT

## 2017-10-25 NOTE — CARE COORDINATION
Spoke with Mr Farhan Garcia and his spouse at bedside. They have picked [facility] as 1st choice for home care. Spoke with Marina at [facility].   Face sheet faxed

## 2017-10-26 LAB
GLUCOSE BLD-MCNC: 109 MG/DL (ref 75–110)
GLUCOSE BLD-MCNC: 122 MG/DL (ref 75–110)
HCT VFR BLD CALC: 21.8 % (ref 41–53)
HEMOGLOBIN: 7.3 G/DL (ref 13.5–17.5)
INR BLD: 1
MAGNESIUM: 2.2 MG/DL (ref 1.6–2.6)
MCH RBC QN AUTO: 28.8 PG (ref 26–34)
MCHC RBC AUTO-ENTMCNC: 33.5 G/DL (ref 31–37)
MCV RBC AUTO: 85.9 FL (ref 80–100)
PDW BLD-RTO: 15.6 % (ref 12.5–15.4)
PLATELET # BLD: 112 K/UL (ref 140–450)
PMV BLD AUTO: 9.9 FL (ref 6–12)
PROTHROMBIN TIME: 11.2 SEC (ref 9.4–12.6)
RBC # BLD: 2.54 M/UL (ref 4.5–5.9)
WBC # BLD: 10.9 K/UL (ref 3.5–11)

## 2017-10-26 PROCEDURE — 97116 GAIT TRAINING THERAPY: CPT

## 2017-10-26 PROCEDURE — 82947 ASSAY GLUCOSE BLOOD QUANT: CPT

## 2017-10-26 PROCEDURE — 97530 THERAPEUTIC ACTIVITIES: CPT

## 2017-10-26 PROCEDURE — 99024 POSTOP FOLLOW-UP VISIT: CPT | Performed by: NURSE PRACTITIONER

## 2017-10-26 PROCEDURE — 97110 THERAPEUTIC EXERCISES: CPT

## 2017-10-26 PROCEDURE — 2140000001 HC CVICU INTERMEDIATE R&B

## 2017-10-26 PROCEDURE — 36415 COLL VENOUS BLD VENIPUNCTURE: CPT

## 2017-10-26 PROCEDURE — 93005 ELECTROCARDIOGRAM TRACING: CPT

## 2017-10-26 PROCEDURE — 99232 SBSQ HOSP IP/OBS MODERATE 35: CPT | Performed by: FAMILY MEDICINE

## 2017-10-26 PROCEDURE — 6370000000 HC RX 637 (ALT 250 FOR IP): Performed by: THORACIC SURGERY (CARDIOTHORACIC VASCULAR SURGERY)

## 2017-10-26 PROCEDURE — 94762 N-INVAS EAR/PLS OXIMTRY CONT: CPT

## 2017-10-26 PROCEDURE — 85027 COMPLETE CBC AUTOMATED: CPT

## 2017-10-26 PROCEDURE — 85610 PROTHROMBIN TIME: CPT

## 2017-10-26 PROCEDURE — 6360000002 HC RX W HCPCS: Performed by: INTERNAL MEDICINE

## 2017-10-26 PROCEDURE — 6360000002 HC RX W HCPCS: Performed by: THORACIC SURGERY (CARDIOTHORACIC VASCULAR SURGERY)

## 2017-10-26 PROCEDURE — 83735 ASSAY OF MAGNESIUM: CPT

## 2017-10-26 RX ORDER — ALPRAZOLAM 0.5 MG/1
0.5 TABLET ORAL 2 TIMES DAILY PRN
Status: DISCONTINUED | OUTPATIENT
Start: 2017-10-26 | End: 2017-10-30 | Stop reason: HOSPADM

## 2017-10-26 RX ADMIN — FUROSEMIDE 20 MG: 10 INJECTION, SOLUTION INTRAVENOUS at 18:13

## 2017-10-26 RX ADMIN — Medication 325 MG: at 09:43

## 2017-10-26 RX ADMIN — FENTANYL CITRATE 50 MCG: 50 INJECTION INTRAMUSCULAR; INTRAVENOUS at 11:56

## 2017-10-26 RX ADMIN — ASPIRIN 81 MG: 81 TABLET, COATED ORAL at 09:45

## 2017-10-26 RX ADMIN — FAMOTIDINE 20 MG: 20 TABLET, FILM COATED ORAL at 09:45

## 2017-10-26 RX ADMIN — ENOXAPARIN SODIUM 100 MG: 100 INJECTION SUBCUTANEOUS at 09:45

## 2017-10-26 RX ADMIN — OXYCODONE HYDROCHLORIDE AND ACETAMINOPHEN 1 TABLET: 5; 325 TABLET ORAL at 12:34

## 2017-10-26 RX ADMIN — FUROSEMIDE 20 MG: 10 INJECTION, SOLUTION INTRAVENOUS at 09:44

## 2017-10-26 RX ADMIN — Medication 325 MG: at 18:13

## 2017-10-26 RX ADMIN — MULTIPLE VITAMINS W/ MINERALS TAB 1 TABLET: TAB at 09:45

## 2017-10-26 RX ADMIN — SIMVASTATIN 20 MG: 20 TABLET, FILM COATED ORAL at 21:09

## 2017-10-26 RX ADMIN — FAMOTIDINE 20 MG: 20 TABLET, FILM COATED ORAL at 21:09

## 2017-10-26 RX ADMIN — ENOXAPARIN SODIUM 100 MG: 100 INJECTION SUBCUTANEOUS at 21:09

## 2017-10-26 RX ADMIN — ALPRAZOLAM 0.5 MG: 0.5 TABLET ORAL at 14:46

## 2017-10-26 RX ADMIN — ALPRAZOLAM 0.5 MG: 0.5 TABLET ORAL at 23:56

## 2017-10-26 RX ADMIN — CITALOPRAM 5 MG: 10 TABLET, FILM COATED ORAL at 09:43

## 2017-10-26 ASSESSMENT — ENCOUNTER SYMPTOMS
SORE THROAT: 0
DIARRHEA: 0
COUGH: 0
SINUS PRESSURE: 0
VOICE CHANGE: 0
CONSTIPATION: 0
ABDOMINAL PAIN: 0
NAUSEA: 1
BLOOD IN STOOL: 0
SHORTNESS OF BREATH: 0
WHEEZING: 0
VOMITING: 0

## 2017-10-26 ASSESSMENT — PAIN DESCRIPTION - LOCATION: LOCATION: STERNUM

## 2017-10-26 ASSESSMENT — PAIN SCALES - GENERAL
PAINLEVEL_OUTOF10: 7
PAINLEVEL_OUTOF10: 2
PAINLEVEL_OUTOF10: 5

## 2017-10-26 ASSESSMENT — PAIN DESCRIPTION - PAIN TYPE: TYPE: SURGICAL PAIN

## 2017-10-26 NOTE — PLAN OF CARE
Problem: Safety:  Goal: Free from accidental physical injury  Free from accidental physical injury   Outcome: Ongoing      Problem: Falls - Risk of:  Goal: Will remain free from falls  Will remain free from falls   Outcome: Ongoing    Goal: Absence of physical injury  Absence of physical injury   Outcome: Ongoing      Problem: Bleeding:  Goal: Will show no signs and symptoms of excessive bleeding  Will show no signs and symptoms of excessive bleeding   Outcome: Ongoing

## 2017-10-26 NOTE — PLAN OF CARE
Christo Vick 18    Second Visit Note  For more detailed information please refer to the progress note of the day      10/26/2017    5:56 PM    Name:   Maryann Jefferson  MRN:     [id number]     Doris:      [de-identified]   Room:   [facility] [place] [address] Day:  9  Admit Date:  10/17/2017  4:54 AM    PCP:   Gisell Medina MD  Code Status:  Full Code        Pt vitals were reviewed   New labs were reviewed   Patient was seen    Updated plan :     1. Patient remains on temporary internal pacer. . Plan for  PPM.  Coumadin has been discontinued. Continue Lovenox for now. Check CBC tomorrow. Transfuse if HGB decreases.          Jo Saleh MD  10/26/2017  5:56 PM

## 2017-10-26 NOTE — PROGRESS NOTES
[facility]   Progress Note                   Date:   10/26/2017  Patient name: Honey Newberry  Date of admission:  10/17/2017  4:54 AM  MRN:   [id number]  YOB: 1960  PCP: Musa Lim MD    Reason for Admission: CHF (congestive heart failure), NYHA class II, acute on chronic, systolic (HCC) [R42.05]    Subjective:   Underwent redo MVR with mechanical prosthesis & Bi-atrial Razo-Maze IV with ALYCE closure on 10/23/17  More fatigued today especially when working with PT. Hb is lower today and patient appears pale. Continues to be AV paced. Lying in bed appearing SOB. Denies chest pain or palpitations.         Intake/Output Summary (Last 24 hours) at 10/26/17 0932  Last data filed at 10/26/17 6528   Gross per 24 hour   Intake              360 ml   Output             1775 ml   Net            -1415 ml         Medications:   Scheduled Meds:   enoxaparin  1 mg/kg Subcutaneous BID    famotidine  20 mg Oral BID    ferrous sulfate  325 mg Oral BID WC    warfarin  5 mg Oral Daily    citalopram  5 mg Oral Daily    furosemide  20 mg Intravenous BID    warfarin (COUMADIN) daily dosing (placeholder)   Other RX Placeholder    amiodarone  200 mg Oral BID    therapeutic multivitamin-minerals  1 tablet Oral Daily with breakfast    simvastatin  20 mg Oral Nightly    aspirin  81 mg Oral Daily    insulin lispro  0-6 Units Subcutaneous TID WC    insulin lispro  0-3 Units Subcutaneous Nightly     Continuous Infusions:   insulin (HUMAN R) non-weight based infusion Stopped (10/25/17 0858)    hetastarch 6% in 0.9% NaCl infusion 500 mL      dextrose       CBC:   Recent Labs      10/24/17   0451  10/25/17   0452  10/26/17   0449   WBC  12.9*  15.0*  10.9   HGB  8.0*  7.7*  7.3*   PLT  74*  103*  112*     BMP:    Recent Labs      10/23/17   2333  10/24/17   0154  10/24/17   0451  10/25/17   0452   NA  145*   --   145*  138   K  4.0  4.5  4.4  4.5   CL  110*   --   113*  104   CO2  21   --   24 26   BUN  21*   --   22*  28*   CREATININE  1.00   --   0.89  1.01   GLUCOSE  191*   --   84  112*     Hepatic:   No results for input(s): AST, ALT, ALB, BILITOT, ALKPHOS in the last 72 hours. Troponin:   No results for input(s): TROPONINI in the last 72 hours. BNP: No results for input(s): BNP in the last 72 hours. Lipids:   No results for input(s): CHOL, HDL in the last 72 hours. Invalid input(s): LDLCALCU  INR:   Recent Labs      10/24/17   1019  10/25/17   0452  10/26/17   0449   INR  1.2  1.0  1.0       STRESS 7/27/17: Small inferior infarct. EF 50%. WMA.      TTE 7/27/17: EF 50%. Segmental WMA. Bioprosthetic MV has evidence of stenosis. ALEENA on 10/18/17:  Bioprosthetic mitral valve is seen. Thickened leaflets, severe mitral regurgitation with multiple jests seen. High velocity across the valve with mean gradient of 15 mm Hg suggestive of dysfunctional bioprosthetic mitral valve. Mild aortic and tricuspid regurgitation. Reduced left ventricular systolic function. No PFO seen by color Doppler. Condition discussed with family and patient, consider CT surgery evaluation. Will need RHC and zohreh nary angiography before any anticipated valve redo surgery. No complications    CARDIAC CATH 10/19/17  Severe 4+ mitral regurgitation  Minimal CAD  Normal LV systolic function  Mildly dilated aortic root with mild AI  Normal right heart pressures    MVR & Razo-Maze (10/23/17): Redo MVR with 27 mm ATS mechanical prosthesis  Bi-atrial Razo-Maze IV   Intra-op LVEF 55%    Objective:   Vitals: /77   Pulse 73   Temp 97.9 °F (36.6 °C) (Oral)   Resp 18   Ht 5' 9\" (1.753 m)   Wt 224 lb 14.4 oz (102 kg)   SpO2 100%   BMI 33.21 kg/m²   General appearance: alert and cooperative with exam  HEENT: Head: Normocephalic, no lesions, without obvious abnormality.   Neck: no JVD, trachea midline, no adenopathy  Lungs: Clear to auscultation  Heart: regular rhythm with normal rate, s1/s2 auscultated, +3/6 murmur left axillary region. Abdomen: soft, non-tender, bowel sounds active  Extremities: no edema. Right groin soft, no drainage, no hematoma, no ecchymosis   Neurologic: not done      Assessment / Acute Cardiac Problems:   1. Severe Mitral Regurgitation s/p redo MVR with 27 mm ATS mechanical prosthesis and Bi-atrial Razo-Maze IV (including ALYCE closure) on 10/23/17  2. 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 Preserved LVEF  4. Paroxysmal A. Fib with RVR (Chads2-Vasc score 1), on Sotalol at home  5. H/O A. Fib ablation  6. Complete heart block with junctional escape rhythm requiring A-sensing V-pacing  7. Post op thrombocytopenia, HIT negative  8. Post op blood loss anemia  9. Essential HTN    Plan of Treatment:   1. Routine post op care per CTS  2. Continue Aspirin, Simvastatin  3. Recommend PRBC transfusion today due to fatigue likely secondary to anemia  4. D/C'd PO Amio today due to underlying junctional rhythm requiring pacing  5. Continue IV Lasix 20mg BID  6. Warfarin started by CTS (INR 1.0 this AM), goal INR 2.5 - 3.5. Bridging with therapeutic lovenox. Would recommend holding Warfarin until patient's permanent pacer status is determined. 7. Underlying complete heart block with junctional escape rhythm requiring A-sensing V-pacing  8. Will need eval for permanent pacer by EP  9. Monitor labs    Will discuss with rounding attending Dr. Marilyn Santana for final recommendations. Keenan Meredith MD   Fellow, [address]    Attending note,   The patient was seen and examined, agree with above, still dependent on his temporary pace maker, underlying high grade/ Complete AV block, Will consult EP to evaluate for PM insertion.

## 2017-10-26 NOTE — PROGRESS NOTES
Recommendations: Strengthening, Endurance Training, Balance Training, Functional Mobility Training, Transfer Training, Gait Training, Stair training, Home Exercise Program, Safety Education & Training, Patient/Caregiver Education & Training, Equipment Evaluation, Education, & procurement  Safety Devices  Type of devices: Call light within reach, Patient at risk for falls, Gait belt, Nurse notified, Left in bed  Restraints  Initially in place: No     Therapy Time   Individual Concurrent Group Co-treatment   Time In 0822         Time Out 0916         Minutes [address], [place]

## 2017-10-26 NOTE — PROGRESS NOTES
[facility]  Progress Note    Surgeon: Maggie  Procedure: Redo mitral valve mechanical   POD#: 3        Subjective:  Mr. Thomas Quintanilla feels well today with no acute complaints. Pain is controlled. OOBTC and ambulating. Last BMs yesterday. Good appetite. Denies chest pain or shortness of breath. Plan of care reviewed and questions answered. Physical Exam  Vitals:  Vitals:    10/26/17 0708   BP: 126/77   Pulse: 73   Resp: 18   Temp: 97.9 °F (36.6 °C)   SpO2: 100%     I/O last 3 completed shifts: In: 360 [P.O.:360]  Out: 1775 [Urine:1775]  No intake/output data recorded. General: Alert and Oriented x3. Up in chair. No apparent distress. HEENT:  Normocephalic and atraumatic. PERRL. EOMI. Lips and oral mucosa moist and without lesions. Neck:  Supple. Trachea midline. Chest:  No abnormality. Equal and symmetric expansion with respiration. Lungs: Clear throughout  Cardiac:  Paced rate and rhythm without murmurs, rubs or gallops. Pacing wires  Yes, DDD at 80. Underlying bradycardic  Abdomen:  Soft, non-tender, normoactive bowel sounds. No masses or organomegaly. Extremities:  Trace edema generalized. Intact pulses in all four extremities. Musculoskeletal:  Intact range of motion of peripheral joints. grossly normal  Neurologic:  Cranial nerves are grossly intact. Non-focal sensory deficits on exam.  Psychiatric: Mood and affect are appropriate. Surgical Wounds: Surgical incisions with clean, dry, intact dressings in place.       Current Medications:    Current Facility-Administered Medications:     enoxaparin (LOVENOX) injection 100 mg, 1 mg/kg, Subcutaneous, BID, Courtney Osei MD, 100 mg at 10/26/17 0945    famotidine (PEPCID) tablet 20 mg, 20 mg, Oral, BID, Courtney Osei MD, 20 mg at 10/26/17 0945    ferrous sulfate tablet 325 mg, 325 mg, Oral, BID WC, Courtney Osei MD, 325 mg at 10/26/17 0943    warfarin (COUMADIN) tablet 5 mg, 5 mg, Oral, Daily, Courtney Osei Kenia Hester MD, 20 mg at 10/25/17 2008    aspirin EC tablet 81 mg, 81 mg, Oral, Daily, Courtney Osei MD, 81 mg at 10/26/17 0945    hetastarch 6% in 0.9% NaCl infusion 500 mL (HESPAN) infusion 500 mL, 500 mL, Intravenous, Continuous PRN, Courtney Osei MD    glucose (GLUTOSE) 40 % oral gel 15 g, 15 g, Oral, PRN, Courtney Osei MD    dextrose 50 % solution 12.5 g, 12.5 g, Intravenous, PRN, Courtney Osei MD, 12.5 g at 10/24/17 0637    glucagon (rDNA) injection 1 mg, 1 mg, Intramuscular, PRN, Courntey Osei MD    dextrose 5 % solution, 100 mL/hr, Intravenous, PRN, Courtney Osei MD    oxyCODONE-acetaminophen (PERCOCET) 5-325 MG per tablet 2 tablet, 2 tablet, Oral, Q4H PRN, Courtney Osei MD, 2 tablet at 10/25/17 2007    insulin lispro (HUMALOG) injection vial 0-6 Units, 0-6 Units, Subcutaneous, TID WC, Courtney Osei MD    insulin lispro (HUMALOG) injection vial 0-3 Units, 0-3 Units, Subcutaneous, Nightly, Courtney Osei MD    Data:  CBC:   Recent Labs      10/24/17   0451  10/25/17   0452  10/26/17   0449   WBC  12.9*  15.0*  10.9   HGB  8.0*  7.7*  7.3*   HCT  23.7*  23.7*  21.8*   MCV  83.7  86.1  85.9   PLT  74*  103*  112*     BMP:   Recent Labs      10/23/17   2333  10/24/17   0154  10/24/17   0451  10/25/17   0452  10/26/17   0449   NA  145*   --   145*  138   --    K  4.0  4.5  4.4  4.5   --    CL  110*   --   113*  104   --    CO2  21   --   24  26   --    BUN  21*   --   22*  28*   --    CREATININE  1.00   --   0.89  1.01   --    MG  2.2   --   2.2  2.4  2.2     Accucheck Glucoses:   Recent Labs      10/25/17   0853  10/25/17   1118  10/25/17   1554  10/25/17   2042  10/26/17   0649   POCGLU  115*  139*  136*  116*  109     Cardiac Enzymes: No results for input(s): CKTOTAL, CKMB, CKMBINDEX, TROPONINI in the last 72 hours.   PTT/PT/INR:   Recent Labs      10/24/17   1019  10/25/17   0452  10/26/17   0449   PROTIME  12.6  11.1  11.2   INR  1.2  1.0  1.0     Recent Labs      10/23/17

## 2017-10-26 NOTE — PROGRESS NOTES
Daily Progress Note     Admit Date: 10/17/2017  Bed/Room No.  1004/1004-01  Admitting Physician : Jerson Nguyen MD  Code Status :Full Code  Hospital Day:  LOS: 9 days   Complaint at Admission :   Chief Complaint   Patient presents with    Shortness of Breath     worsening since wednesday, unlabored breathing, NAD, cardiac hx     Principal Problem:    Acute diastolic CHF (congestive heart failure) ([facility]  Active Problems: Moderate mitral regurgitation    Paroxysmal atrial fibrillation (HCC)    Prolonged QT interval    Subjective: Interval History/Significant events :  10/26/17    Patient POD # 3. Patient feels dizzy and lightheaded after getting up and ambulating. Continues to be on external pacer. Patient's heart rate was 35 junctional after pacer was stopped. Has some difficulty breathing and remains on supplemental oxygen. Vitals - Stable afebrile  Labs - stable , normal kidney function and lites. Nursing notes , Consults notes reviewed. Overnight events and updates discussed with Nursing staff . Background history    Admitted for Acute diastolic CHF (congestive heart failure) ([facility] , in hospital for 9 days . Scarlet Chase 61 y.o. male   is admitted to the hospital for the management of  Acute diastolic CHF, moderate mitral regurgitation. Patient came to emergency room for shortness of breath. He reported that he has been having difficulty in breathing since May 2017. Patient followed with his cardiologist in July and had stress test and echocardiogram.  He was found to have moderate mitral regurgitation. Patient has underlying history of mitral valve replacement in February 2007. He has history of paroxysmal atrial fibrillation and has been on sotalol and atenolol. Patient had unsuccessful. Abrasion before. He also had Maze procedure performed during surgery 2007. Patient takes aspirin 81 mg daily. He reported cough, with clear sputum, orthopnea, PND.   Patient denied any mm  lower lobe nodule.      EKG 10/17/17- sinus tachycardia    ALEENA - 10/18/17  Bioprosthetic mitral valve is seen. Thickened leaflets, severe mitral regurgitation with multiple jests seen. High velocity across the valve with mean gradient of 15 mm Hg suggestive of dysfunctional bioprosthetic mitral valve. Mild aortic and tricuspid regurgitation. Reduced left ventricular systolic function. No PFO seen by color Doppler. Cardiac cath 10/19/17   Severe 4+ mitral regurgitation.   Minimal CAD.   Normal LV systolic function.   Mildly dilated aortic root with mild AI.   Normal right heart pressures. Clinical Course : gradually improving  Assessment and Plan      1. Severe mitral valve regurgitation status post mechanical valve replacement on 10/23/17. Patient had previous bioprosthetic valve replacement on February 2017 . Patient will need long-term anticoagulation. On coumadin . lovenox for bridging . Monitor INR   2. Acute diastolic CHF-  Continue lasix . 3. PAF - status post Razo maze procedure during surgery. .   4. Junctional rhythm - on external temporary pacer - plan for PPM .   5. Cardiac shock- post OP - on epinephrine, Levophed, vasopressin-   Resolved . 6. QTc prolongation -sotalol discontinued - normal electrolytes and mag . 7. Anemia secondary to acute blood loss . May need transfusion , continue monitor . Add ferrous sulphate supplement . 8. Post Op thrombocytopenia - improving . HIT negative . Postop management by CT surgery. Removal of catheter  Encourage incentive spirometry   PT / OT eval .     Continue to monitor vitals , Intake / output ,  Cell count , HGB , Kidney function, oxygenation  as indicated . Wife  at bedside. Discussed with patient .    Plan discussed with Staff Dilcia NUNEZ     (Please note that portions of this note were completed with a voice recognition program. Efforts were made to edit the dictations but occasionally words are mis-transcribed.)      [facility]

## 2017-10-27 ENCOUNTER — APPOINTMENT (OUTPATIENT)
Dept: GENERAL RADIOLOGY | Age: 57
DRG: 216 | End: 2017-10-27
Payer: COMMERCIAL

## 2017-10-27 ENCOUNTER — APPOINTMENT (OUTPATIENT)
Dept: CARDIAC CATH/INVASIVE PROCEDURES | Age: 57
DRG: 216 | End: 2017-10-27
Payer: COMMERCIAL

## 2017-10-27 PROBLEM — Z95.0 PACEMAKER: Status: ACTIVE | Noted: 2017-10-27

## 2017-10-27 LAB
EKG ATRIAL RATE: 86 BPM
EKG P AXIS: 93 DEGREES
EKG Q-T INTERVAL: 490 MS
EKG QRS DURATION: 156 MS
EKG QTC CALCULATION (BAZETT): 481 MS
EKG R AXIS: 65 DEGREES
EKG T AXIS: -34 DEGREES
EKG VENTRICULAR RATE: 58 BPM
GLUCOSE BLD-MCNC: 101 MG/DL (ref 75–110)
GLUCOSE BLD-MCNC: 101 MG/DL (ref 75–110)
GLUCOSE BLD-MCNC: 109 MG/DL (ref 75–110)
HCT VFR BLD CALC: 23.3 % (ref 41–53)
HEMOGLOBIN: 7.7 G/DL (ref 13.5–17.5)
INR BLD: 1.2
MAGNESIUM: 2.2 MG/DL (ref 1.6–2.6)
MCH RBC QN AUTO: 28.7 PG (ref 26–34)
MCHC RBC AUTO-ENTMCNC: 33.2 G/DL (ref 31–37)
MCV RBC AUTO: 86.4 FL (ref 80–100)
PDW BLD-RTO: 15.4 % (ref 12.5–15.4)
PLATELET # BLD: 142 K/UL (ref 140–450)
PMV BLD AUTO: 9.3 FL (ref 6–12)
PROTHROMBIN TIME: 13.1 SEC (ref 9.4–12.6)
RBC # BLD: 2.69 M/UL (ref 4.5–5.9)
WBC # BLD: 8.6 K/UL (ref 3.5–11)

## 2017-10-27 PROCEDURE — 83735 ASSAY OF MAGNESIUM: CPT

## 2017-10-27 PROCEDURE — 02HK3JZ INSERTION OF PACEMAKER LEAD INTO RIGHT VENTRICLE, PERCUTANEOUS APPROACH: ICD-10-PCS | Performed by: INTERNAL MEDICINE

## 2017-10-27 PROCEDURE — 97116 GAIT TRAINING THERAPY: CPT

## 2017-10-27 PROCEDURE — 6360000002 HC RX W HCPCS: Performed by: THORACIC SURGERY (CARDIOTHORACIC VASCULAR SURGERY)

## 2017-10-27 PROCEDURE — 82947 ASSAY GLUCOSE BLOOD QUANT: CPT

## 2017-10-27 PROCEDURE — 2500000003 HC RX 250 WO HCPCS: Performed by: INTERNAL MEDICINE

## 2017-10-27 PROCEDURE — 02H63JZ INSERTION OF PACEMAKER LEAD INTO RIGHT ATRIUM, PERCUTANEOUS APPROACH: ICD-10-PCS | Performed by: INTERNAL MEDICINE

## 2017-10-27 PROCEDURE — 85610 PROTHROMBIN TIME: CPT

## 2017-10-27 PROCEDURE — 6360000002 HC RX W HCPCS

## 2017-10-27 PROCEDURE — 97110 THERAPEUTIC EXERCISES: CPT

## 2017-10-27 PROCEDURE — 92953 TEMPORARY EXTERNAL PACING: CPT

## 2017-10-27 PROCEDURE — 97530 THERAPEUTIC ACTIVITIES: CPT

## 2017-10-27 PROCEDURE — 0JH606Z INSERTION OF PACEMAKER, DUAL CHAMBER INTO CHEST SUBCUTANEOUS TISSUE AND FASCIA, OPEN APPROACH: ICD-10-PCS | Performed by: INTERNAL MEDICINE

## 2017-10-27 PROCEDURE — 2140000001 HC CVICU INTERMEDIATE R&B

## 2017-10-27 PROCEDURE — 99024 POSTOP FOLLOW-UP VISIT: CPT | Performed by: NURSE PRACTITIONER

## 2017-10-27 PROCEDURE — 36415 COLL VENOUS BLD VENIPUNCTURE: CPT

## 2017-10-27 PROCEDURE — 6370000000 HC RX 637 (ALT 250 FOR IP): Performed by: NURSE PRACTITIONER

## 2017-10-27 PROCEDURE — 71010 XR CHEST PORTABLE: CPT

## 2017-10-27 PROCEDURE — 33208 INSRT HEART PM ATRIAL & VENT: CPT | Performed by: INTERNAL MEDICINE

## 2017-10-27 PROCEDURE — 85027 COMPLETE CBC AUTOMATED: CPT

## 2017-10-27 PROCEDURE — 99232 SBSQ HOSP IP/OBS MODERATE 35: CPT | Performed by: INTERNAL MEDICINE

## 2017-10-27 PROCEDURE — 6370000000 HC RX 637 (ALT 250 FOR IP): Performed by: THORACIC SURGERY (CARDIOTHORACIC VASCULAR SURGERY)

## 2017-10-27 RX ORDER — LANOLIN ALCOHOL/MO/W.PET/CERES
325 CREAM (GRAM) TOPICAL 2 TIMES DAILY WITH MEALS
Status: DISCONTINUED | OUTPATIENT
Start: 2017-10-28 | End: 2017-10-28

## 2017-10-27 RX ORDER — SODIUM CHLORIDE 0.9 % (FLUSH) 0.9 %
10 SYRINGE (ML) INJECTION EVERY 12 HOURS SCHEDULED
Status: DISCONTINUED | OUTPATIENT
Start: 2017-10-27 | End: 2017-10-30 | Stop reason: HOSPADM

## 2017-10-27 RX ORDER — SODIUM CHLORIDE 0.9 % (FLUSH) 0.9 %
10 SYRINGE (ML) INJECTION PRN
Status: DISCONTINUED | OUTPATIENT
Start: 2017-10-27 | End: 2017-10-30 | Stop reason: HOSPADM

## 2017-10-27 RX ORDER — LIDOCAINE HYDROCHLORIDE 10 MG/ML
0.4 INJECTION, SOLUTION INFILTRATION; PERINEURAL ONCE
Status: COMPLETED | OUTPATIENT
Start: 2017-10-27 | End: 2017-10-27

## 2017-10-27 RX ORDER — DOCUSATE SODIUM 100 MG/1
100 CAPSULE, LIQUID FILLED ORAL 2 TIMES DAILY
Status: DISCONTINUED | OUTPATIENT
Start: 2017-10-27 | End: 2017-10-30 | Stop reason: HOSPADM

## 2017-10-27 RX ORDER — POLYETHYLENE GLYCOL 3350 17 G/17G
17 POWDER, FOR SOLUTION ORAL DAILY
Status: DISCONTINUED | OUTPATIENT
Start: 2017-10-27 | End: 2017-10-30 | Stop reason: HOSPADM

## 2017-10-27 RX ORDER — ACETAMINOPHEN 325 MG/1
650 TABLET ORAL EVERY 4 HOURS PRN
Status: DISCONTINUED | OUTPATIENT
Start: 2017-10-27 | End: 2017-10-30 | Stop reason: HOSPADM

## 2017-10-27 RX ORDER — VANCOMYCIN HYDROCHLORIDE 1 G/200ML
1000 INJECTION, SOLUTION INTRAVENOUS ONCE
Status: DISCONTINUED | OUTPATIENT
Start: 2017-10-27 | End: 2017-10-30 | Stop reason: HOSPADM

## 2017-10-27 RX ADMIN — Medication 325 MG: at 08:52

## 2017-10-27 RX ADMIN — ALPRAZOLAM 0.5 MG: 0.5 TABLET ORAL at 21:43

## 2017-10-27 RX ADMIN — FAMOTIDINE 20 MG: 20 TABLET, FILM COATED ORAL at 08:50

## 2017-10-27 RX ADMIN — MULTIPLE VITAMINS W/ MINERALS TAB 1 TABLET: TAB at 08:51

## 2017-10-27 RX ADMIN — FAMOTIDINE 20 MG: 20 TABLET, FILM COATED ORAL at 21:44

## 2017-10-27 RX ADMIN — ASPIRIN 81 MG: 81 TABLET, COATED ORAL at 08:50

## 2017-10-27 RX ADMIN — WARFARIN SODIUM 5 MG: 5 TABLET ORAL at 18:30

## 2017-10-27 RX ADMIN — LIDOCAINE HYDROCHLORIDE 0.4 ML: 10 INJECTION, SOLUTION INFILTRATION; PERINEURAL at 15:55

## 2017-10-27 RX ADMIN — SIMVASTATIN 20 MG: 20 TABLET, FILM COATED ORAL at 21:44

## 2017-10-27 RX ADMIN — OXYCODONE HYDROCHLORIDE AND ACETAMINOPHEN 1 TABLET: 5; 325 TABLET ORAL at 18:29

## 2017-10-27 RX ADMIN — CITALOPRAM 5 MG: 10 TABLET, FILM COATED ORAL at 08:51

## 2017-10-27 RX ADMIN — DIPHENHYDRAMINE HCL 25 MG: 25 TABLET ORAL at 21:43

## 2017-10-27 RX ADMIN — FUROSEMIDE 20 MG: 10 INJECTION, SOLUTION INTRAVENOUS at 21:29

## 2017-10-27 RX ADMIN — DOCUSATE SODIUM 100 MG: 100 CAPSULE, LIQUID FILLED ORAL at 21:44

## 2017-10-27 RX ADMIN — DOCUSATE SODIUM 100 MG: 100 CAPSULE, LIQUID FILLED ORAL at 08:56

## 2017-10-27 RX ADMIN — FUROSEMIDE 20 MG: 10 INJECTION, SOLUTION INTRAVENOUS at 08:51

## 2017-10-27 ASSESSMENT — PAIN SCALES - GENERAL
PAINLEVEL_OUTOF10: 0
PAINLEVEL_OUTOF10: 2
PAINLEVEL_OUTOF10: 0
PAINLEVEL_OUTOF10: 0

## 2017-10-27 NOTE — PROGRESS NOTES
[facility]   Progress Note                   Date:   10/27/2017  Patient name: Jason Aguilar  Date of admission:  10/17/2017  4:54 AM  MRN:   [id number]  YOB: 1960  PCP: Flavia Mcgregor MD    Reason for Admission: CHF (congestive heart failure), NYHA class II, acute on chronic, systolic (HCC) [T56.51]    Subjective:   Underwent redo MVR with mechanical prosthesis & Bi-atrial Razo-Maze IV with ALYCE closure on 10/23/17  Lying comfortably in bed this morning, not in any acute distress. To undergo dual chamber pacemaker today. Denies chest pain, dyspnea or palpitations. Intake/Output Summary (Last 24 hours) at 10/27/17 0855  Last data filed at 10/27/17 0640   Gross per 24 hour   Intake              600 ml   Output             4700 ml   Net            -4100 ml         Medications:   Scheduled Meds:   docusate sodium  100 mg Oral BID    polyethylene glycol  17 g Oral Daily    famotidine  20 mg Oral BID    ferrous sulfate  325 mg Oral BID WC    warfarin  5 mg Oral Daily    citalopram  5 mg Oral Daily    furosemide  20 mg Intravenous BID    warfarin (COUMADIN) daily dosing (placeholder)   Other RX Placeholder    therapeutic multivitamin-minerals  1 tablet Oral Daily with breakfast    simvastatin  20 mg Oral Nightly    aspirin  81 mg Oral Daily     Continuous Infusions:   hetastarch 6% in 0.9% NaCl infusion 500 mL      dextrose       CBC:   Recent Labs      10/25/17   0452  10/26/17   0449  10/27/17   0502   WBC  15.0*  10.9  8.6   HGB  7.7*  7.3*  7.7*   PLT  103*  112*  142     BMP:    Recent Labs      10/25/17   0452   NA  138   K  4.5   CL  104   CO2  26   BUN  28*   CREATININE  1.01   GLUCOSE  112*     Hepatic:   No results for input(s): AST, ALT, ALB, BILITOT, ALKPHOS in the last 72 hours. Troponin:   No results for input(s): TROPONINI in the last 72 hours. BNP: No results for input(s): BNP in the last 72 hours.   Lipids:   No results for input(s): CHOL, HDL in the last 72 hours. Invalid input(s): LDLCALCU  INR:   Recent Labs      10/25/17   0452  10/26/17   0449  10/27/17   0502   INR  1.0  1.0  1.2       STRESS 7/27/17: Small inferior infarct. EF 50%. WMA.      TTE 7/27/17: EF 50%. Segmental WMA. Bioprosthetic MV has evidence of stenosis. ALEENA on 10/18/17:  Bioprosthetic mitral valve is seen. Thickened leaflets, severe mitral regurgitation with multiple jests seen. High velocity across the valve with mean gradient of 15 mm Hg suggestive of dysfunctional bioprosthetic mitral valve. Mild aortic and tricuspid regurgitation. Reduced left ventricular systolic function. No PFO seen by color Doppler. Condition discussed with family and patient, consider CT surgery evaluation. Will need RHC and zohreh nary angiography before any anticipated valve redo surgery. No complications    CARDIAC CATH 10/19/17  Severe 4+ mitral regurgitation  Minimal CAD  Normal LV systolic function  Mildly dilated aortic root with mild AI  Normal right heart pressures    MVR & Razo-Maze (10/23/17): Redo MVR with 27 mm ATS mechanical prosthesis  Bi-atrial Razo-Maze IV   Intra-op LVEF 55%    Objective:   Vitals: /74   Pulse 80   Temp 98.1 °F (36.7 °C) (Oral)   Resp 20   Ht 5' 9\" (1.753 m)   Wt 217 lb 11.2 oz (98.7 kg)   SpO2 99%   BMI 32.15 kg/m²   General appearance: alert and cooperative with exam  HEENT: Head: Normocephalic, no lesions, without obvious abnormality. Neck: no JVD, trachea midline, no adenopathy  Lungs: Clear to auscultation  Heart: regular rhythm with normal rate, s1/s2 auscultated, +3/6 murmur left axillary region. Abdomen: soft, non-tender, bowel sounds active  Extremities: no edema. Right groin soft, no drainage, no hematoma, no ecchymosis   Neurologic: not done      Assessment / Acute Cardiac Problems:   1. Severe Mitral Regurgitation s/p redo MVR with 27 mm ATS mechanical prosthesis and Bi-atrial Razo-Maze IV (including ALYCE closure) on 10/23/17  2.  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 Preserved LVEF  4. Paroxysmal A. Fib with RVR (Chads2-Vasc score 1), on Sotalol at home  5. H/O A. Fib ablation  6. Complete heart block with junctional escape rhythm requiring A-sensing V-pacing  7. Post op thrombocytopenia, HIT negative  8. Post op blood loss anemia  9. Essential HTN    Plan of Treatment:   1. Routine post op care per CTS  2. Continue Aspirin, Simvastatin  3. Recommend PRBC transfusion today due to fatigue likely secondary to anemia  4. D/C'd PO Amio today due to underlying junctional rhythm requiring pacing  5. Continue IV Lasix 20mg BID  6. Warfarin started by CTS (INR 1.0 this AM), goal INR 2.5 - 3.5. Bridging with therapeutic lovenox. Would recommend holding Warfarin until patient's permanent pacer status is determined. 7. Underlying complete heart block with junctional escape rhythm requiring A-sensing V-pacing  8. Will need eval for permanent pacer by EP  9. Monitor labs    Will discuss with rounding attending Dr. Tania Martell for final recommendations. Tad Cavanaugh MD   Fellow, [address]    Attending note,   The patient was seen and examined, agree with above, still dependent on his temporary pace maker, underlying high grade/ Complete AV block, Will consult EP to evaluate for PM insertion.

## 2017-10-27 NOTE — PROGRESS NOTES
Stand by assistance  Scooting: Stand by assistance  Comment: With [facility] raised  Transfers  Sit to Stand: Contact guard assistance  Stand to sit: Contact guard assistance   Comments: PT stood x 3-4 min for restroom prior to amb  Ambulation  Ambulation?: Yes  Ambulation 1  Surface: level tile  Device: Rolling Walker  Other Apparatus: O2 (3L)  Assistance: Contact guard assistance  Quality of Gait: Demo extremely slow edward with multiple standing rest breaks, cues for diaphragmatic breathing tech, flexed posture as pt fatigues. Distance: 300 ft  Comments: Pt became extremely fatigued, somewhat SOB, and dizzy after amb and once seated. Several minutes to recover with occasional c/o blurred vision. Bp 102/55 after amb. RN notified re sx's. Stairs/Curb  Stairs?: No     Balance  Posture: Good  Sitting - Static: Good  Sitting - Dynamic: Good  Standing - Static: Fair;+  Standing - Dynamic: Fair  Comments: RW for standing balance, decreased balance as fatigues                           Assessment   Body structures, Functions, Activity limitations: Decreased functional mobility ; Decreased balance;Decreased endurance  Assessment: Likely ok to d/c home with assist from wife. Demo decreased tolerance to amb this date due to excessive fatigue, dizzy, SOB. will continue to assess  Prognosis: Good  REQUIRES PT FOLLOW UP: Yes  Activity Tolerance  Activity Tolerance: Patient limited by endurance; Patient limited by fatigue  PT Equipment Recommendations  Other: TBD       Discharge Recommendations:  Home with assist PRN, Home with Home health PT, Continue to assess pending progress    G-Code     OutComes Score                                                      Goals  Short term goals  Time Frame for Short term goals: 20 visits  Short term goal 1: Pt will be I  with bed mobility  Short term goal 2: Pt will be I with transfers  Short term goal 3: Pt will be I with amb 300' without AD or Brenna with least restrictive AD    Plan Plan  Times per week: BID 7x/wk  Current Treatment Recommendations: Strengthening, Endurance Training, Balance Training, Functional Mobility Training, Transfer Training, Gait Training, Stair training, Home Exercise Program, Safety Education & Training, Patient/Caregiver Education & Training, Equipment Evaluation, Education, & procurement  Safety Devices  Type of devices: Call light within reach, Patient at risk for falls, Gait belt, Nurse notified, Left in chair  Restraints  Initially in place: No     Therapy Time   Individual Concurrent Group Co-treatment   Time In 0915         Time Out 1006         Minutes [address]

## 2017-10-27 NOTE — OP NOTE
[facility] Consult[facility]                Procedure Note    Dual Chamber        Cristo Zuniga (70 y.o., male)  1960      10/27/2017      Procedure: PPM - Dual chamber     Operators:  Primary:  Assistant:    :        Name    [employer]onic XXX   [facility] [place] Ryan # Serial #   Pacer/ICD/Bi-V [id number]   RA-Lead [id number]   RV-Lead [id number]     All leads were tested and reported in chart. · Sedation monitoring  · Left Subclavian Angiogram   · RV lead   · RA lead   · LV lead   · Intra - OP Lead Testing  · Coronary Sinus Angiogram   · Pocket   · Generators Implant  · Fluoro time: 3 min      Indication: Complete Heart Block    Procedure  After the usual preparation of the left neck and chest, the patient was draped in the usual sterile manner. Local anesthesia was infused below the left clavicle from the midline laterally. An incision was made inferior and parallel to the clavicle. The incision was carried down to the fascia. A pocket was formed inferior using blunt dissection. A thin walled 18 gauge needle was used to puncture the left subclavian vein using the modified Seldinger technique. A guide wire was passed into the right heart under fluoroscopic control. This was repeated with a second guide wire. RV Lead Implant:  A 7 Montenegrin sheath was then passed over the guide wire and the guide wire removed. The ventricular lead was advanced through the sheath into the right heart. The lead was then positioned in the right ventricle under fluoroscopic control. The acute pacing and sensing thresholds were measured and found to be satisfactory. RA Lead Implant:  A second 7.0 Montenegrin sheath was then passed over the guide wire and the guide wire removed. The atrial lead was advanced into the right heart. The lead was then positioned in the right atrium.  The acute pacing and sensing thresholds were measured and found to be acceptable. The two sheaths were removed and the leads were secured to the fascia. Generator: The implanted leads were attached to the pacemaker / AICD using the setscrews. The pocket was irrigated with antibiotic solution. The pulse generator and leads were coiled and placed in the pocket. Fluoroscopy was used to verify the final placement of the pacemaker and leads. The pocket was closed using multiple layers of suture and a dry sterile dressing was applied. There were no complications, patient tolerated the procedure well. The patient left the EP lab in stable condition.           Impression / Device:  Successful Implantation of:PPM dual chambers      Plan:  Telemetry monitoring  Interigate pacemaker before discharge  CXR if needed  Wound check at [facility] in 7days  Discharge if patient remains stable        Electronically signed by Chemo Lao MD on 10/27/2017 at 4:23 PM  [facility] Cardiology Consultant  [phone]

## 2017-10-27 NOTE — PROGRESS NOTES
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 Preserved LVEF  4. Paroxysmal A. Fib with RVR (Chads2-Vasc score 1), on Sotalol at home  5. H/O A. Fib ablation  6. Complete heart block with junctional escape rhythm requiring A-sensing V-pacing  7. Post op thrombocytopenia, HIT negative  8. Post op blood loss anemia  9. Essential HTN    Plan of Treatment:   1. Routine post op care per CTS  2. Continue Aspirin, Simvastatin  3. Continue IV Lasix 20mg BID  4. Will need Warfarin post pacer placement (INR 1.2 this AM), goal INR 2.5 - 3.5. Do not bridge after pacer placement, just start Warfarin  5. Underlying complete heart block with junctional escape rhythm requiring A-sensing V-pacing  6. To go for dual chamber pacer placement today  7. Monitor labs    Will discuss with rounding attending Dr. Lauren Barron for final recommendations. Aminta Muir MD   Fellow, [address]  Attending Physician Statement  I have discussed the care of the patient, including pertinent history and exam findings, with the resident. I have seen and examined the patient and the key elements of all parts of the encounter have been performed by me. I agree with the assessment, plan and orders as documented by the resident.   Lyle Goel

## 2017-10-27 NOTE — PROGRESS NOTES
surgery. Patient underwent cardiac cath on 10/19/17 and showed normal coronaries. Patient underwent surgery on 10/23/17 and had mechanical valve placed with Razo Maze . Sangeetapraful Batres Postoperatively he needed to 3 pressors for blood pressure control. He was also given amiodarone infusion for Vtach . Patient was weaned of pressors. Extubated on 10/24/17 . Seferino Rust removed on 10/24/17. Patient has complete AV block with AV pacing and was placed on external pacer via epicardial leads . Chest tube removed 10/25/17 . Review of Systems:     Constitutional:  negative for chills, fevers, sweats  Respiratory:  negative for cough, exertional SOB present  Cardiovascular:  negative for chest pain, chest pressure/discomfort, lower extremity edema, palpitations  Gastrointestinal:  negative for abdominal pain, constipation, diarrhea, nausea, vomiting  Neurological:  negative for dizziness, headache    Medications:      Allergies:  No Known Allergies    Current Meds:   Scheduled Meds:    docusate sodium  100 mg Oral BID    polyethylene glycol  17 g Oral Daily    famotidine  20 mg Oral BID    ferrous sulfate  325 mg Oral BID WC    warfarin  5 mg Oral Daily    citalopram  5 mg Oral Daily    furosemide  20 mg Intravenous BID    warfarin (COUMADIN) daily dosing (placeholder)   Other RX Placeholder    therapeutic multivitamin-minerals  1 tablet Oral Daily with breakfast    simvastatin  20 mg Oral Nightly    aspirin  81 mg Oral Daily     Continuous Infusions:    hetastarch 6% in 0.9% NaCl infusion 500 mL      dextrose       PRN Meds: ALPRAZolam, metoclopramide, sodium chloride flush, calcium chloride IVPB, magnesium sulfate, potassium chloride, acetaminophen, fentanNYL, diphenhydrAMINE, magnesium hydroxide, ondansetron, hydrALAZINE, hetastarch 6% in 0.9% NaCl infusion 500 mL, glucose, dextrose, glucagon (rDNA), dextrose, oxyCODONE-acetaminophen    Data:     Past Medical History:   has a past medical history of Acute diastolic CHF (congestive heart failure) ([facility] [address] [facility] Social History:   reports that he has never smoked. He has never used smokeless tobacco. He reports that he does not drink alcohol or use drugs. Family History: History reviewed. No pertinent family history. Vitals:  BP (!) 142/63   Pulse 71   Temp 99.1 °F (37.3 °C) (Oral)   Resp 16   Ht 5' 9\" (1.753 m)   Wt 217 lb 11.2 oz (98.7 kg)   SpO2 95%   BMI 32.15 kg/m²   Temp (24hrs), Av.7 °F (37.1 °C), Min:98.1 °F (36.7 °C), Max:99.1 °F (37.3 °C)    Recent Labs      10/26/17   2022  10/27/17   0700  10/27/17   1002  10/27/17   1154   POCGLU  122*  109  101  101       I/O (24Hr):     Intake/Output Summary (Last 24 hours) at 10/27/17 1456  Last data filed at 10/27/17 1420   Gross per 24 hour   Intake              240 ml   Output             2700 ml   Net            -2460 ml       Labs:    [unfilled]    Lab Results   Component Value Date/Time    SPECIAL NOT REPORTED 10/23/2017 08:13 AM     Lab Results   Component Value Date/Time    CULTURE NO GROWTH 10/23/2017 08:13 AM    CULTURE  10/23/2017 08:13 AM     [employer] [address] [address] [phone]       Lab Results   Component Value Date    POCPH 7.380 10/23/2017    POCPCO2 36.2 10/23/2017    POCPO2 103.0 10/23/2017    POCHCO3 21.4 10/23/2017    NBEA 3 10/23/2017    PBEA NOT REPORTED 10/23/2017    OBH3DPC 23 10/23/2017    VTMS7BLM 98 10/23/2017    FIO2 40.0 10/23/2017     Physical Examination:        General appearance:  alert, cooperative and no distress  Mental Status:  oriented to person, place and time and normal affect  Lungs:  clear to auscultation bilaterally, normal effort  Heart:  Loud S1  Abdomen:  soft, nontender, nondistended, normal bowel sounds, no masses, hepatomegaly, splenomegaly  Extremities:  Mild leg swelling of both arms and legs noted  Skin:  no gross lesions, rashes, induration    Assessment:        Primary Problem  Acute diastolic CHF (congestive heart failure) (HCC)    Active Hospital Problems    Diagnosis Date Noted    Acute diastolic CHF (congestive heart failure) (HCC) [I50.31] 10/17/2017    Moderate mitral regurgitation [I34.0] 10/17/2017    Paroxysmal atrial fibrillation ([address] [I48.0] 10/17/2017    Prolonged QT interval [R94.31]  Dysfunctional prosthetic mitral valve 10/17/2017     Plan:        Lasix continued at this time  Possible PPM placement today  Discussed with patient and wife at bedside      [address], MD  10/27/2017  2:56 PM

## 2017-10-27 NOTE — PROGRESS NOTES
Physical Therapy  Facility/Department: [facility]  Daily Treatment Note  NAME: Tonia Mcconnell  : 1960  MRN: [id number]    Date of Service: 10/27/2017    Patient Diagnosis(es):   Patient Active Problem List    Diagnosis Date Noted    Acute diastolic CHF (congestive heart failure) ([facility] [address] 10/17/2017    Moderate mitral regurgitation 10/17/2017    Paroxysmal atrial fibrillation ([email or web address] 10/17/2017    Prolonged QT interval 10/17/2017       Past Medical History:   Diagnosis Date    Acute diastolic CHF (congestive heart failure) ([facility] 10/17/2017     Past Surgical History:   Procedure Laterality Date    CARDIAC CATHETERIZATION  10/19/2017    right and left heart cath Dr. Kemp Litten  2007    MITRAL VALVE REPLACEMENT N/A 10/23/2017    REDO STERNOTOMY, MITRAL VALVE REPLACEMENT 27MM [employer]IC OPEN PIVOT MECHANICAL, ON PUMP, SWAN, ALEENA performed by Chemo Whitehead MD at [address] [place] [address] TRANSESOPHAGEAL ECHOCARDIOGRAM  10-18-19       Restrictions  Restrictions/Precautions  Restrictions/Precautions: Cardiac, General Precautions, Surgical Protocols, Fall Risk  Required Braces or Orthoses?: No  Position Activity Restriction  Sternal Precautions: No Pushing, No Pulling, 5# Lifting Restrictions  Sternal Precautions: MVR w/Re-do Sternotomy 10/23/17  Other position/activity restrictions: Chest tubes, IV  Subjective   General  Response To Previous Treatment: Patient with no complaints from previous session.   Family / Caregiver Present: Yes  Subjective  Subjective: Pt reports feeling better today, pain well controlled, but still  \"woozy\"  General Comment  Comments: Pt getting permanent pacemaker this pm  Pain Screening  Patient Currently in Pain: No  Vital Signs  Patient Currently in Pain: No       Orientation  Orientation  Overall Orientation Status: Within Functional Limits  Objective   Bed mobility  Rolling to Left: Stand by assistance  Rolling to Right: Stand by assistance  Supine to Sit: Stand by assistance  Scooting: Stand by assistance  Comment: With [facility] raised  Transfers  Sit to Stand: Contact guard assistance  Stand to sit: Contact guard assistance  Ambulation  Ambulation?: Yes  Ambulation 1  Surface: level tile  Device: Rolling Walker  Other Apparatus: O2 (3L)  Assistance: Contact guard assistance  Quality of Gait: Demo extremely slow edward with multiple standing rest breaks, cues for diaphragmatic breathing tech, flexed posture as pt fatigues. Distance: 200 ft  Comments: Fatigued and somewhat SOB after. Reports  \"I think i over did it this morning\"  Stairs/Curb  Stairs?: No     Balance  Posture: Good  Sitting - Static: Good  Sitting - Dynamic: Good  Standing - Static: Fair;+  Standing - Dynamic: Fair  Comments: RW for standing balance, decreased balance as fatigues    Exercise  B LE exercise per CRP x 15 reps  Deferred UE ex at this time as nsg entered to prep for pacemaker                       Assessment   Body structures, Functions, Activity limitations: Decreased functional mobility ; Decreased balance;Decreased endurance  Assessment: Likely ok to d/c home with assist from wife. Demo decreased tolerance to amb this date due to excessive fatigue, dizzy, SOB. will continue to assess  Prognosis: Good  REQUIRES PT FOLLOW UP: Yes  Activity Tolerance  Activity Tolerance: Patient limited by endurance; Patient limited by fatigue  PT Equipment Recommendations  Other: TBD       Discharge Recommendations:  Home with Home health PT    G-Code     OutComes Score                                                      Goals  Short term goals  Time Frame for Short term goals: 20 visits  Short term goal 1: Pt will be I  with bed mobility  Short term goal 2: Pt will be I with transfers  Short term goal 3: Pt will be I with amb 300' without AD or Brenna with least restrictive AD    Plan    Plan  Times per week: BID 7x/wk  Current Treatment Recommendations: Strengthening, Endurance Training, Balance Training, Functional Mobility Training, Transfer Training, Gait Training, Stair training, Home Exercise Program, Safety Education & Training, Patient/Caregiver Education & Training, Equipment Evaluation, Education, & procurement  Safety Devices  Type of devices: Call light within reach, Patient at risk for falls, Gait belt, Nurse notified, Left in chair  Restraints  Initially in place: No     Therapy Time   Individual Concurrent Group Co-treatment   Time In 1331         Time Out 1355         Minutes [address] [place] [address]

## 2017-10-27 NOTE — PROGRESS NOTES
[address] [facility] [address]  Occupational Therapy Not Seen Note    Patient not available for Occupational Therapy due to:    [] Testing:    [] Hemodialysis    [] Blood Transfusion in Progress    []Refusal by Patient:    [] Surgery/Procedure:    [] Strict Bedrest    [] Sedation    [] Spine Precautions     [] Pt being transferred to palliative care at this time. Spoke with pt/family and OT services to be defered. [] Pt independent with functional mobility and functional tasks.  Pt with no OT acute care needs at this time, will defer OT eval.    [x] Other: Pt off unit for permanent pacemaker placement @1555pm    Next Scheduled Treatment: 10-30-17    Signature: OWEN Daly

## 2017-10-27 NOTE — PROGRESS NOTES
at 10/25/17 1851    citalopram (CELEXA) tablet 5 mg, 5 mg, Oral, Daily, Fern Siemens, MD, 5 mg at 10/26/17 0943    furosemide (LASIX) injection 20 mg, 20 mg, Intravenous, BID, Fern Siemens, MD, 20 mg at 10/26/17 1813    warfarin (COUMADIN) daily dosing (placeholder), , Other, RX Placeholder, Fern Siemens, MD    metoclopramide (REGLAN) tablet 10 mg, 10 mg, Oral, Q6H PRN, Fern Siemens, MD, 10 mg at 10/24/17 1354    sodium chloride flush 0.9 % injection 10 mL, 10 mL, Intravenous, PRN, Fern Siemens, MD, 10 mL at 10/25/17 2009    calcium chloride 1 g in sodium chloride 0.9 % 100 mL IVPB, 1 g, Intravenous, PRN, Fern Siemens, MD, Stopped at 10/23/17 1954    magnesium sulfate 1 g in dextrose 5% 100 mL IVPB, 1 g, Intravenous, PRN, Fern Siemens, MD    potassium chloride 20 mEq/50 mL IVPB (Central Line), 20 mEq, Intravenous, PRN, Fern Siemens, MD, Last Rate: 50 mL/hr at 10/24/17 0620, 20 mEq at 10/24/17 0620    acetaminophen (TYLENOL) tablet 650 mg, 650 mg, Oral, Q4H PRN, Fern Siemens, MD    fentaNYL (SUBLIMAZE) injection 50 mcg, 50 mcg, Intravenous, Q1H PRN, Fern Siemens, MD, 50 mcg at 10/26/17 1156    diphenhydrAMINE (BENADRYL) tablet 25 mg, 25 mg, Oral, Nightly PRN, Fern Siemens, MD, 25 mg at 10/25/17 2007    magnesium hydroxide (MILK OF MAGNESIA) 400 MG/5ML suspension 30 mL, 30 mL, Oral, Daily PRN, Fern Siemens, MD    ondansetron (ZOFRAN) injection 4 mg, 4 mg, Intravenous, Q8H PRN, Fern Siemens, MD    hydrALAZINE (APRESOLINE) injection 5 mg, 5 mg, Intravenous, Q5 Min PRN, Fern Siemens, MD    therapeutic multivitamin-minerals 1 tablet, 1 tablet, Oral, Daily with breakfast, Fern Siemens, MD, 1 tablet at 10/26/17 0915    simvastatin (ZOCOR) tablet 20 mg, 20 mg, Oral, Nightly, Fern Siemens, MD, 20 mg at 10/26/17 2109    aspirin EC tablet 81 mg, 81 mg, Oral, Daily, Fern Siemens, MD, 81 mg at 10/26/17 0954    hetastarch 6% in 0.9% NaCl infusion 500 mL (HESPAN) 7.7   Giving PO iron  DVT ppx:  SCD's while stationary  Patient is on  ASA, Statin therapy per protocol   Plan for discharge Home with assist when medically stable    The above recommendations including medications and orders were discussed and agreed upon with  the attending on service for the cardiothoracic surgery group today.      Monster Valiente CNP  Phone: [phone]

## 2017-10-28 ENCOUNTER — APPOINTMENT (OUTPATIENT)
Dept: GENERAL RADIOLOGY | Age: 57
DRG: 216 | End: 2017-10-28
Payer: COMMERCIAL

## 2017-10-28 PROBLEM — I97.89 POSTOPERATIVE COMPLETE HEART BLOCK (HCC): Status: ACTIVE | Noted: 2017-10-28

## 2017-10-28 PROBLEM — Z95.2 H/O MITRAL VALVE REPLACEMENT WITH MECHANICAL VALVE: Status: ACTIVE | Noted: 2017-10-28

## 2017-10-28 PROBLEM — I44.2 POSTOPERATIVE COMPLETE HEART BLOCK (HCC): Status: ACTIVE | Noted: 2017-10-28

## 2017-10-28 LAB
ANION GAP SERPL CALCULATED.3IONS-SCNC: 10 MMOL/L (ref 9–17)
BUN BLDV-MCNC: 18 MG/DL (ref 6–20)
BUN/CREAT BLD: ABNORMAL (ref 9–20)
CALCIUM SERPL-MCNC: 8.5 MG/DL (ref 8.6–10.4)
CHLORIDE BLD-SCNC: 95 MMOL/L (ref 98–107)
CO2: 30 MMOL/L (ref 20–31)
CREAT SERPL-MCNC: 0.82 MG/DL (ref 0.7–1.2)
GFR AFRICAN AMERICAN: >60 ML/MIN
GFR NON-AFRICAN AMERICAN: >60 ML/MIN
GFR SERPL CREATININE-BSD FRML MDRD: ABNORMAL ML/MIN/{1.73_M2}
GFR SERPL CREATININE-BSD FRML MDRD: ABNORMAL ML/MIN/{1.73_M2}
GLUCOSE BLD-MCNC: 115 MG/DL (ref 70–99)
HCT VFR BLD CALC: 23 % (ref 41–53)
HEMOGLOBIN: 7.9 G/DL (ref 13.5–17.5)
INR BLD: 1.4
MAGNESIUM: 2.1 MG/DL (ref 1.6–2.6)
MCH RBC QN AUTO: 29.1 PG (ref 26–34)
MCHC RBC AUTO-ENTMCNC: 34.2 G/DL (ref 31–37)
MCV RBC AUTO: 85.2 FL (ref 80–100)
PDW BLD-RTO: 15 % (ref 12.5–15.4)
PLATELET # BLD: 191 K/UL (ref 140–450)
PMV BLD AUTO: 8.6 FL (ref 6–12)
POTASSIUM SERPL-SCNC: 4 MMOL/L (ref 3.7–5.3)
PROTHROMBIN TIME: 15 SEC (ref 9.4–12.6)
RBC # BLD: 2.7 M/UL (ref 4.5–5.9)
SODIUM BLD-SCNC: 135 MMOL/L (ref 135–144)
WBC # BLD: 6.6 K/UL (ref 3.5–11)

## 2017-10-28 PROCEDURE — 2580000003 HC RX 258: Performed by: INTERNAL MEDICINE

## 2017-10-28 PROCEDURE — 83735 ASSAY OF MAGNESIUM: CPT

## 2017-10-28 PROCEDURE — 2140000001 HC CVICU INTERMEDIATE R&B

## 2017-10-28 PROCEDURE — 6370000000 HC RX 637 (ALT 250 FOR IP): Performed by: INTERNAL MEDICINE

## 2017-10-28 PROCEDURE — 85610 PROTHROMBIN TIME: CPT

## 2017-10-28 PROCEDURE — 71010 XR CHEST PORTABLE: CPT

## 2017-10-28 PROCEDURE — 97110 THERAPEUTIC EXERCISES: CPT

## 2017-10-28 PROCEDURE — 99232 SBSQ HOSP IP/OBS MODERATE 35: CPT | Performed by: INTERNAL MEDICINE

## 2017-10-28 PROCEDURE — 85027 COMPLETE CBC AUTOMATED: CPT

## 2017-10-28 PROCEDURE — 93005 ELECTROCARDIOGRAM TRACING: CPT

## 2017-10-28 PROCEDURE — 6370000000 HC RX 637 (ALT 250 FOR IP): Performed by: STUDENT IN AN ORGANIZED HEALTH CARE EDUCATION/TRAINING PROGRAM

## 2017-10-28 PROCEDURE — 6370000000 HC RX 637 (ALT 250 FOR IP): Performed by: THORACIC SURGERY (CARDIOTHORACIC VASCULAR SURGERY)

## 2017-10-28 PROCEDURE — 6360000002 HC RX W HCPCS: Performed by: NURSE PRACTITIONER

## 2017-10-28 PROCEDURE — 6370000000 HC RX 637 (ALT 250 FOR IP): Performed by: NURSE PRACTITIONER

## 2017-10-28 PROCEDURE — 6360000002 HC RX W HCPCS: Performed by: THORACIC SURGERY (CARDIOTHORACIC VASCULAR SURGERY)

## 2017-10-28 PROCEDURE — 94762 N-INVAS EAR/PLS OXIMTRY CONT: CPT

## 2017-10-28 PROCEDURE — 97530 THERAPEUTIC ACTIVITIES: CPT

## 2017-10-28 PROCEDURE — 97116 GAIT TRAINING THERAPY: CPT

## 2017-10-28 PROCEDURE — 99024 POSTOP FOLLOW-UP VISIT: CPT | Performed by: NURSE PRACTITIONER

## 2017-10-28 PROCEDURE — 80048 BASIC METABOLIC PNL TOTAL CA: CPT

## 2017-10-28 PROCEDURE — 36415 COLL VENOUS BLD VENIPUNCTURE: CPT

## 2017-10-28 RX ORDER — FUROSEMIDE 40 MG/1
40 TABLET ORAL 2 TIMES DAILY
Status: DISCONTINUED | OUTPATIENT
Start: 2017-10-28 | End: 2017-10-30 | Stop reason: HOSPADM

## 2017-10-28 RX ORDER — SOTALOL HYDROCHLORIDE 80 MG/1
40 TABLET ORAL 2 TIMES DAILY
Status: DISCONTINUED | OUTPATIENT
Start: 2017-10-28 | End: 2017-10-30 | Stop reason: HOSPADM

## 2017-10-28 RX ORDER — LANOLIN ALCOHOL/MO/W.PET/CERES
325 CREAM (GRAM) TOPICAL
Status: DISCONTINUED | OUTPATIENT
Start: 2017-10-28 | End: 2017-10-30 | Stop reason: HOSPADM

## 2017-10-28 RX ADMIN — SOTALOL HYDROCHLORIDE 40 MG: 80 TABLET ORAL at 16:06

## 2017-10-28 RX ADMIN — WARFARIN SODIUM 5 MG: 5 TABLET ORAL at 17:51

## 2017-10-28 RX ADMIN — FAMOTIDINE 20 MG: 20 TABLET, FILM COATED ORAL at 08:34

## 2017-10-28 RX ADMIN — CITALOPRAM 5 MG: 10 TABLET, FILM COATED ORAL at 08:34

## 2017-10-28 RX ADMIN — FUROSEMIDE 40 MG: 40 TABLET ORAL at 16:06

## 2017-10-28 RX ADMIN — DOCUSATE SODIUM 100 MG: 100 CAPSULE, LIQUID FILLED ORAL at 08:35

## 2017-10-28 RX ADMIN — DOCUSATE SODIUM 100 MG: 100 CAPSULE, LIQUID FILLED ORAL at 22:12

## 2017-10-28 RX ADMIN — SOTALOL HYDROCHLORIDE 40 MG: 80 TABLET ORAL at 22:12

## 2017-10-28 RX ADMIN — FAMOTIDINE 20 MG: 20 TABLET, FILM COATED ORAL at 22:12

## 2017-10-28 RX ADMIN — FERROUS SULFATE TAB EC 325 MG (65 MG FE EQUIVALENT) 325 MG: 325 (65 FE) TABLET DELAYED RESPONSE at 17:51

## 2017-10-28 RX ADMIN — Medication 10 ML: at 22:19

## 2017-10-28 RX ADMIN — FUROSEMIDE 20 MG: 10 INJECTION, SOLUTION INTRAVENOUS at 08:36

## 2017-10-28 RX ADMIN — ACETAMINOPHEN 650 MG: 325 TABLET ORAL at 22:13

## 2017-10-28 RX ADMIN — ASPIRIN 81 MG: 81 TABLET, COATED ORAL at 08:35

## 2017-10-28 RX ADMIN — DIPHENHYDRAMINE HCL 25 MG: 25 TABLET ORAL at 22:14

## 2017-10-28 RX ADMIN — ENOXAPARIN SODIUM 100 MG: 100 INJECTION SUBCUTANEOUS at 22:11

## 2017-10-28 RX ADMIN — MULTIPLE VITAMINS W/ MINERALS TAB 1 TABLET: TAB at 08:35

## 2017-10-28 RX ADMIN — ENOXAPARIN SODIUM 100 MG: 100 INJECTION SUBCUTANEOUS at 11:00

## 2017-10-28 RX ADMIN — SIMVASTATIN 20 MG: 20 TABLET, FILM COATED ORAL at 22:11

## 2017-10-28 RX ADMIN — ALPRAZOLAM 0.25 MG: 0.5 TABLET ORAL at 22:14

## 2017-10-28 RX ADMIN — FERROUS SULFATE TAB EC 325 MG (65 MG FE EQUIVALENT) 325 MG: 325 (65 FE) TABLET DELAYED RESPONSE at 08:34

## 2017-10-28 ASSESSMENT — PAIN DESCRIPTION - LOCATION: LOCATION: CHEST

## 2017-10-28 ASSESSMENT — PAIN DESCRIPTION - ORIENTATION: ORIENTATION: LEFT;MID

## 2017-10-28 ASSESSMENT — PAIN DESCRIPTION - PAIN TYPE: TYPE: SURGICAL PAIN

## 2017-10-28 ASSESSMENT — PAIN SCALES - GENERAL
PAINLEVEL_OUTOF10: 0
PAINLEVEL_OUTOF10: 1
PAINLEVEL_OUTOF10: 0
PAINLEVEL_OUTOF10: 0
PAINLEVEL_OUTOF10: 2
PAINLEVEL_OUTOF10: 0
PAINLEVEL_OUTOF10: 1
PAINLEVEL_OUTOF10: 0

## 2017-10-28 ASSESSMENT — PAIN DESCRIPTION - DESCRIPTORS: DESCRIPTORS: DISCOMFORT

## 2017-10-28 ASSESSMENT — PAIN DESCRIPTION - ONSET: ONSET: UNABLE TO TELL

## 2017-10-28 ASSESSMENT — PAIN DESCRIPTION - FREQUENCY: FREQUENCY: INTERMITTENT

## 2017-10-28 NOTE — PROGRESS NOTES
[facility]    Progress Note    10/28/2017    8:43 AM    Name:   Akin Bronson  MRN:     [id number]     Acct:      [de-identified]   Room:   [address] [place] [address] Day:  11  Admit Date:  10/17/2017  4:54 AM    PCP:   Esteban Shrestha MD  Code Status:  Full Code    Subjective:     C/C:   Chief Complaint   Patient presents with    Shortness of Breath     worsening since wednesday, unlabored breathing, NAD, cardiac hx     Interval History Status: improved. Had pacer at bedside. SBP at 788 systolic  Still has some exertional SOB    Brief History:     Akin Bronson 61 y.o. male   is admitted to the hospital for the management of  Acute diastolic CHF, moderate mitral regurgitation. Patient came to emergency room for shortness of breath.  He reported that he has been having difficulty in breathing since May 2017.  Patient followed with his cardiologist in July and had stress test and echocardiogram. Pillo Martell was found to have moderate mitral regurgitation.  Patient has underlying history of mitral valve replacement in February 2007. Pillo Martell has history of paroxysmal atrial fibrillation and has been on sotalol and atenolol.  Patient had unsuccessful ablation before. He also had Maze procedure performed during surgery 2007. Patient takes aspirin 81 mg daily. Pillo Martell reported cough, with clear sputum, orthopnea, PND. Initial evaluation in the emergency room showed heart rate 130s.  Troponins negative.  CT chest was performed to rule out pulmonary embolism and did not show any central or subsegmental pulmonary emboli.  Patient was given Lasix and admitted for further evaluation and treatment for acute CHF. Patient had atrial fibrillation and was started on Cardizem infusion and heparin low-dose infusion. Underwent trans-esophageal echocardiogram on 10/18/17 which showed dysfunctional bioprosthetic mitral valve , mean gradient 15 mmHg.   Patient was recommended redo surgery. Patient underwent cardiac cath on 10/19/17 and showed normal coronaries. Patient underwent surgery on 10/23/17 and had mechanical valve placed with Razo Maze . Sean Magana Postoperatively he needed to 3 pressors for blood pressure control. He was also given amiodarone infusion for Vtach . Patient was weaned of pressors. Extubated on 10/24/17 . Wandy Radon removed on 10/24/17. Patient has complete AV block with AV pacing and was placed on external pacer via epicardial leads. PPM placed 10/27/17. Chest tube removed 10/25/17. Advised multiple medication changes at discharge    Review of Systems:     Constitutional:  negative for chills, fevers, sweats  Respiratory:  negative for cough, exertional SOB present  Cardiovascular:  negative for chest pain, chest pressure/discomfort, lower extremity edema, palpitations  Gastrointestinal:  negative for abdominal pain, constipation, diarrhea, nausea, vomiting  Neurological:  negative for dizziness, headache    Medications:      Allergies:  No Known Allergies    Current Meds:   Scheduled Meds:    docusate sodium  100 mg Oral BID    polyethylene glycol  17 g Oral Daily    vancomycin  1,000 mg Intravenous Once    sodium chloride flush  10 mL Intravenous 2 times per day    bacitracin in 0.9 % sodium chloride irrigation  50,000 Units Topical Once    ferrous sulfate  325 mg Oral BID WC    famotidine  20 mg Oral BID    warfarin  5 mg Oral Daily    citalopram  5 mg Oral Daily    furosemide  20 mg Intravenous BID    warfarin (COUMADIN) daily dosing (placeholder)   Other RX Placeholder    therapeutic multivitamin-minerals  1 tablet Oral Daily with breakfast    simvastatin  20 mg Oral Nightly    aspirin  81 mg Oral Daily     Continuous Infusions:    hetastarch 6% in 0.9% NaCl infusion 500 mL      dextrose       PRN Meds: sodium chloride flush, acetaminophen, ALPRAZolam, metoclopramide, sodium chloride flush, calcium chloride IVPB, magnesium sulfate, potassium chloride, acetaminophen, fentanNYL, diphenhydrAMINE, magnesium hydroxide, ondansetron, hydrALAZINE, hetastarch 6% in 0.9% NaCl infusion 500 mL, glucose, dextrose, glucagon (rDNA), dextrose, oxyCODONE-acetaminophen    Data:     Past Medical History:   has a past medical history of Acute diastolic CHF (congestive heart failure) ([address] Social History:   reports that he has never smoked. He has never used smokeless tobacco. He reports that he does not drink alcohol or use drugs. Family History: History reviewed. No pertinent family history. Vitals:  BP (!) 153/85   Pulse 91   Temp 98.2 °F (36.8 °C) (Oral)   Resp 18   Ht 5' 9\" (1.753 m)   Wt 218 lb 0.6 oz (98.9 kg)   SpO2 97%   BMI 32.20 kg/m²   Temp (24hrs), Av.6 °F (37 °C), Min:98.2 °F (36.8 °C), Max:99.1 °F (37.3 °C)    Recent Labs      10/26/17   2022  10/27/17   0700  10/27/17   1002  10/27/17   1154   POCGLU  122*  109  101  101       I/O (24Hr):     Intake/Output Summary (Last 24 hours) at 10/28/17 0843  Last data filed at 10/27/17 1420   Gross per 24 hour   Intake                0 ml   Output              400 ml   Net             -400 ml       Labs:    Lab Results   Component Value Date/Time    SPECIAL NOT REPORTED 10/23/2017 08:13 AM     Lab Results   Component Value Date/Time    CULTURE NO GROWTH 10/23/2017 08:13 AM    CULTURE  10/23/2017 08:13 AM     [facility] [id number] [place] [address], [address] [place] [address] [phone]       Lab Results   Component Value Date    POCPH 7.380 10/23/2017    POCPCO2 36.2 10/23/2017    POCPO2 103.0 10/23/2017    POCHCO3 21.4 10/23/2017    NBEA 3 10/23/2017    PBEA NOT REPORTED 10/23/2017    PHT4RUM 23 10/23/2017    LQAX9APM 98 10/23/2017    FIO2 40.0 10/23/2017     Physical Examination:        General appearance:  alert, cooperative and no distress  Mental Status:  oriented to person, place and time and normal affect  Lungs:  clear to auscultation bilaterally, normal effort  Heart:  Loud S1  Abdomen:  soft, nontender, nondistended, normal bowel sounds, no masses, hepatomegaly, splenomegaly  Extremities:  Mild leg swelling of both arms and legs noted  Skin:  no gross lesions, rashes, induration    Assessment:        Primary Problem  Pacemaker    Active Hospital Problems    Diagnosis Date Noted    Acute diastolic CHF (congestive heart failure) (HCC) [I50.31] 10/17/2017    Moderate mitral regurgitation [I34.0] 10/17/2017    Paroxysmal atrial fibrillation ([address] [I48.0] 10/17/2017    Prolonged QT interval [R94.31]  Dysfunctional prosthetic mitral valve 10/17/2017     Plan:        Principal Problem:    Acute diastolic CHF (congestive heart failure) ([address] from prosthetic valve dysfiunction    Pacemaker Implant - Dual 10/27/17-Dr. Matamoros    Moderate mitral regurgitation    Paroxysmal atrial fibrillation (HCC)    Prolonged QT interval    H/O mitral valve replacement with mechanical valve during this admission    Postoperative complete heart block ([address] status post PPM placement      Antonina Marroquin MD  10/28/2017  8:43 AM

## 2017-10-28 NOTE — PROGRESS NOTES
Physical Therapy  Facility/Department: [facility] CAR 1  Daily Treatment Note  NAME: Traci Rojas  : 1960  MRN: [id number]    Date of Service: 10/28/2017    Patient Diagnosis(es):   Patient Active Problem List    Diagnosis Date Noted    Pacemaker Implant - Dual 10/27/17-Dr. Judah Duong 10/27/2017     Priority: High    H/O mitral valve replacement with mechanical valve 10/28/2017    Postoperative complete heart block (HCC) 10/28/2017    Acute congestive heart failure (HCC)     Acute diastolic CHF (congestive heart failure) ([address] 10/17/2017    Moderate mitral regurgitation 10/17/2017    Paroxysmal atrial fibrillation ([address] 10/17/2017    Prolonged QT interval 10/17/2017       Past Medical History:   Diagnosis Date    Acute diastolic CHF (congestive heart failure) ([address] 10/17/2017     Past Surgical History:   Procedure Laterality Date    CARDIAC CATHETERIZATION  10/19/2017    right and left heart cath Dr. Celestino Phoenix  2007    MITRAL VALVE REPLACEMENT N/A 10/23/2017    REDO STERNOTOMY, MITRAL VALVE REPLACEMENT 27MM MEDTRONIC OPEN PIVOT MECHANICAL, ON PUMP, SWAN, ALEENA performed by Claudeen Birchwood, MD at [address] [place] [address] TRANSESOPHAGEAL ECHOCARDIOGRAM  10-18-19       Restrictions  Restrictions/Precautions  Restrictions/Precautions: ROM Restrictions  Required Braces or Orthoses?: No  Implants present? : Pacemaker  Upper Extremity Weight Bearing Restrictions  Other: ROM to 90 only, due to pace maker 10/27  Position Activity Restriction  Sternal Precautions: No Pushing, No Pulling, 5# Lifting Restrictions  Sternal Precautions: MVR w/Re-do Sternotomy 10/23/17  Other position/activity restrictions: Chest tubes, IV  Subjective   General  Response To Previous Treatment: Patient with no complaints from previous session. Family / Caregiver Present: Yes  Subjective  Subjective: Pt reports he continues to feel better.  Pace maker yesterday PM.           Orientation     Objective      Transfers  Sit to

## 2017-10-28 NOTE — PROGRESS NOTES
[facility]  Progress Note    Surgeon: Maggie  Procedure: Redo mitral valve mechanical  POD#: 5  EF: 55%      Subjective:  Mr. Kathy Grissom feels well today with no acute complaints. Pacer placed yesterday. Needs interrogated. Pain is controlled. Still anemic. Continues to be diuresed. OOBTC and ambulating. Last BMs yesterday good appetite, Denies chest pain or shortness of breath. Plan of care reviewed and questions answered. Physical Exam  Vitals:  Vitals:    10/28/17 0600   BP: (!) 153/85   Pulse: 91   Resp: 18   Temp: 98.2 °F (36.8 °C)   SpO2: 97%     I/O last 3 completed shifts:  In: -   Out: 400 [Urine:400]  No intake/output data recorded. General: Alert and Oriented x3. Up in chair. No apparent distress. HEENT:  Normocephalic and atraumatic. PERRL. EOMI. Lips and oral mucosa moist and without lesions. Neck:  Supple. Trachea midline. Chest:  No abnormality. Equal and symmetric expansion with respiration. Lungs:  Clear throughout  Cardiac:  Paced via perm pacer  Abdomen:  Soft, non-tender, normoactive bowel sounds. No masses or organomegaly. Extremities:  Trace generalized edema. Intact pulses in all four extremities. Musculoskeletal:  Intact range of motion of peripheral joints. grossly normal  Neurologic:  Non-focal sensory deficits on exam.  Psychiatric: Mood and affect are appropriate. Surgical Wounds: Surgical incisions with clean, dry, intact dressings in place.  Pacer site soft    Current Medications:    Current Facility-Administered Medications:     docusate sodium (COLACE) capsule 100 mg, 100 mg, Oral, BID, Jorge Lundberg CNP, 100 mg at 10/28/17 0835    polyethylene glycol (GLYCOLAX) packet 17 g, 17 g, Oral, Daily, Jorge Lundberg CNP    vancomycin (VANCOCIN) 1000 mg in dextrose 5% 200 mL IVPB, 1,000 mg, Intravenous, Once, Lindsey Dixon MD    sodium chloride flush 0.9 % injection 10 mL, 10 mL, Intravenous, 2 times per day, Lindsey Dixon MD    sodium chloride flush 0.9 % injection 10 mL, 10 mL, Intravenous, PRN, Kiera Parker MD    acetaminophen (TYLENOL) tablet 650 mg, 650 mg, Oral, Q4H PRN, Kiera Parker MD    bacitracin 50,000 Units in sodium chloride 0.9 % 1,000 mL irrigation, 50,000 Units, Topical, Once, Kiera Parker MD    ferrous sulfate EC tablet 325 mg, 325 mg, Oral, BID WC, David Corea MD, 325 mg at 10/28/17 0834    ALPRAZolam (XANAX) tablet 0.5 mg, 0.5 mg, Oral, BID PRN, David Corea MD, 0.5 mg at 10/27/17 2143    famotidine (PEPCID) tablet 20 mg, 20 mg, Oral, BID, David Corea MD, 20 mg at 10/28/17 0834    warfarin (COUMADIN) tablet 5 mg, 5 mg, Oral, Daily, David Corea MD, 5 mg at 10/27/17 1830    citalopram (CELEXA) tablet 5 mg, 5 mg, Oral, Daily, David Corea MD, 5 mg at 10/28/17 0834    furosemide (LASIX) injection 20 mg, 20 mg, Intravenous, BID, David Corea MD, 20 mg at 10/28/17 0836    warfarin (COUMADIN) daily dosing (placeholder), , Other, RX Christiane, David Corea MD    metoclopramide (REGLAN) tablet 10 mg, 10 mg, Oral, Q6H PRN, David Corea MD, 10 mg at 10/24/17 1354    sodium chloride flush 0.9 % injection 10 mL, 10 mL, Intravenous, PRN, David Corea MD, 10 mL at 10/25/17 2009    calcium chloride 1 g in sodium chloride 0.9 % 100 mL IVPB, 1 g, Intravenous, PRN, David Corea MD, Stopped at 10/23/17 1954    magnesium sulfate 1 g in dextrose 5% 100 mL IVPB, 1 g, Intravenous, PRN, David Corea MD    potassium chloride 20 mEq/50 mL IVPB (Central Line), 20 mEq, Intravenous, PRN, David Corea MD, Last Rate: 50 mL/hr at 10/24/17 0620, 20 mEq at 10/24/17 0620    acetaminophen (TYLENOL) tablet 650 mg, 650 mg, Oral, Q4H PRN, David Corea MD    fentaNYL (SUBLIMAZE) injection 50 mcg, 50 mcg, Intravenous, Q1H PRN, David Corea MD, 50 mcg at 10/26/17 9225    diphenhydrAMINE (BENADRYL) tablet 25 mg, 25 mg, Oral, Nightly PRN, David Corea MD, 25 mg at 10/27/17 0970   magnesium hydroxide (MILK OF MAGNESIA) 400 MG/5ML suspension 30 mL, 30 mL, Oral, Daily PRN, Ethan Killian MD    ondansetron (ZOFRAN) injection 4 mg, 4 mg, Intravenous, Q8H PRN, Ethan Killian MD    hydrALAZINE (APRESOLINE) injection 5 mg, 5 mg, Intravenous, Q5 Min PRN, Ethan Killian MD    therapeutic multivitamin-minerals 1 tablet, 1 tablet, Oral, Daily with breakfast, Ethan Killian MD, 1 tablet at 10/28/17 0835    simvastatin (ZOCOR) tablet 20 mg, 20 mg, Oral, Nightly, Ethan Killian MD, 20 mg at 10/27/17 2144    aspirin EC tablet 81 mg, 81 mg, Oral, Daily, Ethan Killian MD, 81 mg at 10/28/17 0835    hetastarch 6% in 0.9% NaCl infusion 500 mL (HESPAN) infusion 500 mL, 500 mL, Intravenous, Continuous PRN, Ethan Killian MD    glucose (GLUTOSE) 40 % oral gel 15 g, 15 g, Oral, PRN, Ethan Killian MD    dextrose 50 % solution 12.5 g, 12.5 g, Intravenous, PRN, Ethan Killian MD, 12.5 g at 10/24/17 0637    glucagon (rDNA) injection 1 mg, 1 mg, Intramuscular, PRN, Ethan Kililan MD    dextrose 5 % solution, 100 mL/hr, Intravenous, PRN, Ethan Killian MD    oxyCODONE-acetaminophen (PERCOCET) 5-325 MG per tablet 2 tablet, 2 tablet, Oral, Q4H PRN, Ethan Killian MD, 1 tablet at 10/27/17 1829    Data:  CBC:   Recent Labs      10/26/17   0449  10/27/17   0502  10/28/17   0502   WBC  10.9  8.6  6.6   HGB  7.3*  7.7*  7.9*   HCT  21.8*  23.3*  23.0*   MCV  85.9  86.4  85.2   PLT  112*  142  191     BMP:   Recent Labs      10/26/17   0449  10/27/17   0502  10/28/17   0502   NA   --    --   135   K   --    --   4.0   CL   --    --   95*   CO2   --    --   30   BUN   --    --   18   CREATININE   --    --   0.82   MG  2.2  2.2  2.1     Accucheck Glucoses:   Recent Labs      10/26/17   0649  10/26/17   2022  10/27/17   0700  10/27/17   1002  10/27/17   1154   POCGLU  109  122*  109  101  101     Cardiac Enzymes: No results for input(s): CKTOTAL, CKMB, CKMBINDEX, TROPONINI in the last 72 hours.  PTT/PT/INR:   Recent Labs      10/26/17   0449  10/27/17   0502  10/28/17   0502   PROTIME  11.2  13.1*  15.0*   INR  1.0  1.2  1.4     No results for input(s): APTT in the last 72 hours. Assessment & Plan:   1. S/p Redo mitral mechanical valve                        POD 5                        Continue warfarin 5mg today. INR 1.4. Goal 2.5-3.5 for mechanical valve. wILL BRIDGE WITH lOVENOX 1 MG/KG  2. ABLA   HGb stable, low at 7.9   Giving PO iron  3. Bradycardia   Pacer implanted yesterday. To be interrogated   Start BB tomorrow     DVT ppx:  SCD's while stationary  Patient is on  ASA, Statin therapy per protocol   Plan for discharge Home with assist when medically stable    The above recommendations including medications and orders were discussed and agreed upon with  the attending on service for the cardiothoracic surgery group today.      Tyree Bailey CNP  Phone: [phone]

## 2017-10-28 NOTE — PROGRESS NOTES
Physical Therapy  Facility/Department: [facility] CAR 1  Daily Treatment Note  NAME: Angela Dozier  : 1960  MRN: [id number]    Date of Service: 10/28/2017    Patient Diagnosis(es):   Patient Active Problem List    Diagnosis Date Noted    Pacemaker Implant - Dual 10/27/17-Dr. Marko Aguilar 10/27/2017     Priority: High    H/O mitral valve replacement with mechanical valve 10/28/2017    Postoperative complete heart block (HCC) 10/28/2017    Acute congestive heart failure (HCC)     Acute diastolic CHF (congestive heart failure) ([address] 10/17/2017    Moderate mitral regurgitation 10/17/2017    Paroxysmal atrial fibrillation ([address] 10/17/2017    Prolonged QT interval 10/17/2017       Past Medical History:   Diagnosis Date    Acute diastolic CHF (congestive heart failure) ([address] 10/17/2017     Past Surgical History:   Procedure Laterality Date    CARDIAC CATHETERIZATION  10/19/2017    right and left heart cath Dr. Crystal Lord  2007    MITRAL VALVE REPLACEMENT N/A 10/23/2017    REDO STERNOTOMY, MITRAL VALVE REPLACEMENT 27MM MEDTRONIC OPEN PIVOT MECHANICAL, ON PUMP, SWAN, ALEENA performed by Anival Gastelum MD at [address] [place] [address] TRANSESOPHAGEAL ECHOCARDIOGRAM  10-18-19       Restrictions  Restrictions/Precautions  Restrictions/Precautions: ROM Restrictions  Required Braces or Orthoses?: No  Implants present? : Pacemaker  Upper Extremity Weight Bearing Restrictions  Other: ROM to 90 only, due to pace maker 10/27  Position Activity Restriction  Sternal Precautions: No Pushing, No Pulling, 5# Lifting Restrictions  Sternal Precautions: MVR w/Re-do Sternotomy 10/23/17  Other position/activity restrictions: Chest tubes, IV  Subjective   General  Response To Previous Treatment: Patient with no complaints from previous session. Family / Caregiver Present: Yes  Subjective  Subjective: Pt reports he continues to feel better.  Pace maker yesterday PM.   Pain Screening  Patient Currently in Pain: No  Vital Equipment Evaluation, Education, & procurement  Safety Devices  Type of devices: Call light within reach, Patient at risk for falls, Gait belt, Nurse notified, Left in bed  Restraints  Initially in place: No     Therapy Time   Individual Concurrent Group Co-treatment   Time In 1542         Time Out 1607         Minutes [address] [address] ., [place]

## 2017-10-28 NOTE — PROGRESS NOTES
[facility]   Progress Note                   Date:   10/28/2017  Patient name: Jael Hopkins  Date of admission:  10/17/2017  4:54 AM  MRN:   [id number]  YOB: 1960  PCP: Sana Chau MD    Reason for Admission: CHF (congestive heart failure), NYHA class II, acute on chronic, systolic (HCC) [D04.14]    Subjective:   Underwent redo MVR with mechanical prosthesis & Bi-atrial Razo-Maze IV with ALYCE closure on 10/23/17  S/P DDD pacemaker for the complete heart block(10/27/2017). Post op X ray chest shows no pneumo throax. Patient has been feeling much better since yesterday. Patient has intrinsic beat for some time but getting AV paced most of time through out night. Denies any pain. Looking comfortable. Started back on A.C for the Mechanical Mitral Valve. Device interrogation shows the A & V lead sensing at same time with X ray showing malpositioning of atrial lead.         Intake/Output Summary (Last 24 hours) at 10/28/17 0916  Last data filed at 10/27/17 1420   Gross per 24 hour   Intake                0 ml   Output              400 ml   Net             -400 ml         Medications:   Scheduled Meds:   docusate sodium  100 mg Oral BID    polyethylene glycol  17 g Oral Daily    vancomycin  1,000 mg Intravenous Once    sodium chloride flush  10 mL Intravenous 2 times per day    bacitracin in 0.9 % sodium chloride irrigation  50,000 Units Topical Once    ferrous sulfate  325 mg Oral BID WC    famotidine  20 mg Oral BID    warfarin  5 mg Oral Daily    citalopram  5 mg Oral Daily    furosemide  20 mg Intravenous BID    warfarin (COUMADIN) daily dosing (placeholder)   Other RX Placeholder    therapeutic multivitamin-minerals  1 tablet Oral Daily with breakfast    simvastatin  20 mg Oral Nightly    aspirin  81 mg Oral Daily     Continuous Infusions:   hetastarch 6% in 0.9% NaCl infusion 500 mL      dextrose       CBC:   Recent Labs      10/26/17   0449  10/27/17 0502  10/28/17   0502   WBC  10.9  8.6  6.6   HGB  7.3*  7.7*  7.9*   PLT  112*  142  191     BMP:    Recent Labs      10/28/17   0502   NA  135   K  4.0   CL  95*   CO2  30   BUN  18   CREATININE  0.82   GLUCOSE  115*     INR:   Recent Labs      10/26/17   0449  10/27/17   0502  10/28/17   0502   INR  1.0  1.2  1.4       STRESS 7/27/17: Small inferior infarct. EF 50%. WMA.      TTE 7/27/17: EF 50%. Segmental WMA. Bioprosthetic MV has evidence of stenosis. ALEENA on 10/18/17:  Bioprosthetic mitral valve is seen. Thickened leaflets, severe mitral regurgitation with multiple jests seen. High velocity across the valve with mean gradient of 15 mm Hg suggestive of dysfunctional bioprosthetic mitral valve. Mild aortic and tricuspid regurgitation. Reduced left ventricular systolic function. No PFO seen by color Doppler. Condition discussed with family and patient, consider CT surgery evaluation. Will need RHC and zohreh nary angiography before any anticipated valve redo surgery. No complications    CARDIAC CATH 10/19/17  Severe 4+ mitral regurgitation  Minimal CAD  Normal LV systolic function  Mildly dilated aortic root with mild AI  Normal right heart pressures    MVR & Razo-Maze (10/23/17): Redo MVR with 27 mm ATS mechanical prosthesis  Bi-atrial Razo-Maze IV   Intra-op LVEF 55%    Objective:   Vitals: BP (!) 153/85   Pulse 91   Temp 98.2 °F (36.8 °C) (Oral)   Resp 18   Ht 5' 9\" (1.753 m)   Wt 218 lb 0.6 oz (98.9 kg)   SpO2 97%   BMI 32.20 kg/m²   General appearance: alert and cooperative with exam  HEENT: Head: Normocephalic, no lesions, without obvious abnormality. Neck: left sided pacemaker pocket, trachea midline, no adenopathy  Lungs: Clear to auscultation  Heart: regular rhythm with normal rate, s1/s2 auscultated, +3/6 murmur left axillary region. Abdomen: soft, non-tender, bowel sounds active  Extremities: no edema.  Right groin soft, no drainage, no hematoma, no ecchymosis         Assessment / Acute Cardiac Problems:   1. Severe Mitral Regurgitation s/p redo MVR with 27 mm ATS mechanical prosthesis and Bi-atrial Razo-Maze IV (including ALYCE closure) on 10/23/17  2. Complete Heart block post MVR S/P Dual chamber pacemaker(10/27/2017), with atrial lead malposition. 3. 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 Preserved LVEF  5. Paroxysmal A. Fib with RVR (Chads2-Vasc score 1), on Sotalol at home  6. H/O A. Fib ablation  7. Post op thrombocytopenia, HIT negative  8. Post op blood loss anemia  9. Essential HTN    Plan of Treatment:   1. Routine post op care per CTS  2. Will switch from DDD to VVI mode. Will restart the sotalol  3. Continue Aspirin, Simvastatin  4. I/O is negative 4.8 liter. Will recommend switching to PO lasix for the patient. 5. Recommend restarting coumadin 5 mg. (INR 1.4 this AM), goal INR 2.5 - 3.5.    6. Paced heart rhythm. 7. Can follow with cardiology O/P. Will discuss with rounding attending. Lynette Shafer MD   Fellow, Cardiovascular Diseases  [facility]    Attending Physician Statement  I have discussed the case of Sukhwinder Garibay including pertinent history and exam findings with the resident. I have seen and examined the patient and the key elements of the encounter have been performed by me. I agree with the assessment, plan and orders as documented by the resident With changes made to the note.      Electronically signed by Hamida Burns MD on 10/28/2017 at 5:03 PM.    [facility]      [phone]

## 2017-10-28 NOTE — PLAN OF CARE
Problem: Safety:  Goal: Free from accidental physical injury  Free from accidental physical injury   Outcome: Ongoing      Problem: Falls - Risk of:  Goal: Absence of physical injury  Absence of physical injury   Outcome: Ongoing      Problem: Discharge Planning:  Goal: Discharged to appropriate level of care  Discharged to appropriate level of care   Outcome: Ongoing      Problem:  Activity Intolerance:  Goal: Able to perform prescribed physical activity  Able to perform prescribed physical activity   Outcome: Ongoing    Goal: Ability to tolerate increased activity will improve  Ability to tolerate increased activity will improve   Outcome: Ongoing      Problem: Anxiety:  Goal: Level of anxiety will decrease  Level of anxiety will decrease   Outcome: Ongoing      Problem: Cardiac Output - Decreased:  Goal: Cardiac output within specified parameters  Cardiac output within specified parameters   Outcome: Ongoing    Goal: Hemodynamic stability will improve  Hemodynamic stability will improve   Outcome: Ongoing      Problem: Fluid Volume - Imbalance:  Goal: Ability to achieve a balanced intake and output will improve  Ability to achieve a balanced intake and output will improve   Outcome: Ongoing    Goal: Chest tube drainage is within specified parameters  Chest tube drainage is within specified parameters   Outcome: Ongoing      Problem: Gas Exchange - Impaired:  Goal: Levels of oxygenation will improve  Levels of oxygenation will improve   Outcome: Ongoing    Goal: Ability to maintain adequate ventilation will improve  Ability to maintain adequate ventilation will improve   Outcome: Ongoing      Problem: Pain:  Goal: Pain level will decrease  Pain level will decrease    Outcome: Ongoing    Goal: Control of acute pain  Control of acute pain   Outcome: Ongoing    Goal: Control of chronic pain  Control of chronic pain   Outcome: Ongoing      Problem: Tissue Perfusion - Cardiopulmonary, Altered:  Goal: Absence of

## 2017-10-29 LAB
HCT VFR BLD CALC: 22.4 % (ref 41–53)
HEMOGLOBIN: 7.6 G/DL (ref 13.5–17.5)
INR BLD: 1.7
MAGNESIUM: 2.2 MG/DL (ref 1.6–2.6)
MCH RBC QN AUTO: 28.9 PG (ref 26–34)
MCHC RBC AUTO-ENTMCNC: 33.9 G/DL (ref 31–37)
MCV RBC AUTO: 85.1 FL (ref 80–100)
PDW BLD-RTO: 14.9 % (ref 12.5–15.4)
PLATELET # BLD: 215 K/UL (ref 140–450)
PMV BLD AUTO: 8.4 FL (ref 6–12)
PROTHROMBIN TIME: 18.6 SEC (ref 9.4–12.6)
RBC # BLD: 2.63 M/UL (ref 4.5–5.9)
WBC # BLD: 6.6 K/UL (ref 3.5–11)

## 2017-10-29 PROCEDURE — 97530 THERAPEUTIC ACTIVITIES: CPT

## 2017-10-29 PROCEDURE — 85610 PROTHROMBIN TIME: CPT

## 2017-10-29 PROCEDURE — 6370000000 HC RX 637 (ALT 250 FOR IP): Performed by: THORACIC SURGERY (CARDIOTHORACIC VASCULAR SURGERY)

## 2017-10-29 PROCEDURE — 36415 COLL VENOUS BLD VENIPUNCTURE: CPT

## 2017-10-29 PROCEDURE — G0328 FECAL BLOOD SCRN IMMUNOASSAY: HCPCS

## 2017-10-29 PROCEDURE — 99024 POSTOP FOLLOW-UP VISIT: CPT | Performed by: NURSE PRACTITIONER

## 2017-10-29 PROCEDURE — 97110 THERAPEUTIC EXERCISES: CPT

## 2017-10-29 PROCEDURE — 2580000003 HC RX 258: Performed by: INTERNAL MEDICINE

## 2017-10-29 PROCEDURE — 83735 ASSAY OF MAGNESIUM: CPT

## 2017-10-29 PROCEDURE — 99232 SBSQ HOSP IP/OBS MODERATE 35: CPT | Performed by: INTERNAL MEDICINE

## 2017-10-29 PROCEDURE — 6370000000 HC RX 637 (ALT 250 FOR IP): Performed by: NURSE PRACTITIONER

## 2017-10-29 PROCEDURE — 6370000000 HC RX 637 (ALT 250 FOR IP): Performed by: INTERNAL MEDICINE

## 2017-10-29 PROCEDURE — 6370000000 HC RX 637 (ALT 250 FOR IP): Performed by: STUDENT IN AN ORGANIZED HEALTH CARE EDUCATION/TRAINING PROGRAM

## 2017-10-29 PROCEDURE — 93005 ELECTROCARDIOGRAM TRACING: CPT

## 2017-10-29 PROCEDURE — 97116 GAIT TRAINING THERAPY: CPT

## 2017-10-29 PROCEDURE — 94762 N-INVAS EAR/PLS OXIMTRY CONT: CPT

## 2017-10-29 PROCEDURE — 2140000001 HC CVICU INTERMEDIATE R&B

## 2017-10-29 PROCEDURE — 6360000002 HC RX W HCPCS: Performed by: NURSE PRACTITIONER

## 2017-10-29 PROCEDURE — 85027 COMPLETE CBC AUTOMATED: CPT

## 2017-10-29 RX ORDER — WARFARIN SODIUM 7.5 MG/1
7.5 TABLET ORAL DAILY
Status: DISCONTINUED | OUTPATIENT
Start: 2017-10-29 | End: 2017-10-30

## 2017-10-29 RX ADMIN — FERROUS SULFATE TAB EC 325 MG (65 MG FE EQUIVALENT) 325 MG: 325 (65 FE) TABLET DELAYED RESPONSE at 17:15

## 2017-10-29 RX ADMIN — FAMOTIDINE 20 MG: 20 TABLET, FILM COATED ORAL at 20:39

## 2017-10-29 RX ADMIN — MULTIPLE VITAMINS W/ MINERALS TAB 1 TABLET: TAB at 08:18

## 2017-10-29 RX ADMIN — ENOXAPARIN SODIUM 100 MG: 100 INJECTION SUBCUTANEOUS at 20:38

## 2017-10-29 RX ADMIN — DOCUSATE SODIUM 100 MG: 100 CAPSULE, LIQUID FILLED ORAL at 08:16

## 2017-10-29 RX ADMIN — FAMOTIDINE 20 MG: 20 TABLET, FILM COATED ORAL at 08:18

## 2017-10-29 RX ADMIN — FUROSEMIDE 40 MG: 40 TABLET ORAL at 17:15

## 2017-10-29 RX ADMIN — ACETAMINOPHEN 650 MG: 325 TABLET ORAL at 21:40

## 2017-10-29 RX ADMIN — FUROSEMIDE 40 MG: 40 TABLET ORAL at 08:18

## 2017-10-29 RX ADMIN — ASPIRIN 81 MG: 81 TABLET, COATED ORAL at 08:18

## 2017-10-29 RX ADMIN — Medication 10 ML: at 08:15

## 2017-10-29 RX ADMIN — CITALOPRAM 5 MG: 10 TABLET, FILM COATED ORAL at 08:15

## 2017-10-29 RX ADMIN — ALPRAZOLAM 0.5 MG: 0.5 TABLET ORAL at 21:40

## 2017-10-29 RX ADMIN — SOTALOL HYDROCHLORIDE 40 MG: 80 TABLET ORAL at 08:16

## 2017-10-29 RX ADMIN — OXYCODONE HYDROCHLORIDE AND ACETAMINOPHEN 1 TABLET: 5; 325 TABLET ORAL at 22:55

## 2017-10-29 RX ADMIN — FERROUS SULFATE TAB EC 325 MG (65 MG FE EQUIVALENT) 325 MG: 325 (65 FE) TABLET DELAYED RESPONSE at 08:18

## 2017-10-29 RX ADMIN — WARFARIN SODIUM 7.5 MG: 7.5 TABLET ORAL at 17:17

## 2017-10-29 RX ADMIN — DIPHENHYDRAMINE HCL 25 MG: 25 TABLET ORAL at 22:55

## 2017-10-29 RX ADMIN — SOTALOL HYDROCHLORIDE 40 MG: 80 TABLET ORAL at 21:39

## 2017-10-29 RX ADMIN — SIMVASTATIN 20 MG: 20 TABLET, FILM COATED ORAL at 20:39

## 2017-10-29 RX ADMIN — ENOXAPARIN SODIUM 100 MG: 100 INJECTION SUBCUTANEOUS at 08:15

## 2017-10-29 RX ADMIN — Medication 10 ML: at 20:39

## 2017-10-29 ASSESSMENT — PAIN SCALES - GENERAL
PAINLEVEL_OUTOF10: 0
PAINLEVEL_OUTOF10: 2
PAINLEVEL_OUTOF10: 0
PAINLEVEL_OUTOF10: 1
PAINLEVEL_OUTOF10: 0
PAINLEVEL_OUTOF10: 0
PAINLEVEL_OUTOF10: 2
PAINLEVEL_OUTOF10: 0
PAINLEVEL_OUTOF10: 0

## 2017-10-29 ASSESSMENT — PAIN DESCRIPTION - LOCATION: LOCATION: CHEST

## 2017-10-29 ASSESSMENT — PAIN DESCRIPTION - PAIN TYPE: TYPE: SURGICAL PAIN

## 2017-10-29 ASSESSMENT — PAIN DESCRIPTION - FREQUENCY: FREQUENCY: INTERMITTENT

## 2017-10-29 NOTE — PROGRESS NOTES
mL/hr at 10/24/17 0620, 20 mEq at 10/24/17 0620    acetaminophen (TYLENOL) tablet 650 mg, 650 mg, Oral, Q4H PRN, Claudeen Birchwood, MD    fentaNYL (SUBLIMAZE) injection 50 mcg, 50 mcg, Intravenous, Q1H PRN, Claudeen Birchwood, MD, 50 mcg at 10/26/17 1156    diphenhydrAMINE (BENADRYL) tablet 25 mg, 25 mg, Oral, Nightly PRN, Claudeen Birchwood, MD, 25 mg at 10/28/17 2214    magnesium hydroxide (MILK OF MAGNESIA) 400 MG/5ML suspension 30 mL, 30 mL, Oral, Daily PRN, Claudeen Birchwood, MD    ondansetron (ZOFRAN) injection 4 mg, 4 mg, Intravenous, Q8H PRN, Claudeen Birchwood, MD    hydrALAZINE (APRESOLINE) injection 5 mg, 5 mg, Intravenous, Q5 Min PRN, Claudeen Birchwood, MD    therapeutic multivitamin-minerals 1 tablet, 1 tablet, Oral, Daily with breakfast, Claudeen Birchwood, MD, 1 tablet at 10/29/17 0818    simvastatin (ZOCOR) tablet 20 mg, 20 mg, Oral, Nightly, Claudeen Birchwood, MD, 20 mg at 10/28/17 2211    aspirin EC tablet 81 mg, 81 mg, Oral, Daily, Claudeen Birchwood, MD, 81 mg at 10/29/17 0818    hetastarch 6% in 0.9% NaCl infusion 500 mL (HESPAN) infusion 500 mL, 500 mL, Intravenous, Continuous PRN, Claudeen Birchwood, MD    glucose (GLUTOSE) 40 % oral gel 15 g, 15 g, Oral, PRN, Claudeen Birchwood, MD    dextrose 50 % solution 12.5 g, 12.5 g, Intravenous, PRN, Claudeen Birchwood, MD, 12.5 g at 10/24/17 0637    glucagon (rDNA) injection 1 mg, 1 mg, Intramuscular, PRN, Claudeen Birchwood, MD    dextrose 5 % solution, 100 mL/hr, Intravenous, PRN, Claudeen Birchwood, MD    oxyCODONE-acetaminophen (PERCOCET) 5-325 MG per tablet 2 tablet, 2 tablet, Oral, Q4H PRN, Claudeen Birchwood, MD, 1 tablet at 10/27/17 1828    Data:  CBC:   Recent Labs      10/27/17   0502  10/28/17   0502  10/29/17   0410   WBC  8.6  6.6  6.6   HGB  7.7*  7.9*  7.6*   HCT  23.3*  23.0*  22.4*   MCV  86.4  85.2  85.1   PLT  142  191  215     BMP:   Recent Labs      10/27/17   0502  10/28/17   0502  10/29/17   0410   NA   --   135   --    K   --   4.0   --    CL   --   95*

## 2017-10-29 NOTE — PLAN OF CARE
Problem: Falls - Risk of:  Goal: Absence of physical injury  Absence of physical injury   Outcome: Ongoing      Problem: Discharge Planning:  Goal: Discharged to appropriate level of care  Discharged to appropriate level of care   Outcome: Ongoing      Problem:  Activity Intolerance:  Goal: Able to perform prescribed physical activity  Able to perform prescribed physical activity   Outcome: Ongoing    Goal: Ability to tolerate increased activity will improve  Ability to tolerate increased activity will improve   Outcome: Ongoing      Problem: Anxiety:  Goal: Level of anxiety will decrease  Level of anxiety will decrease   Outcome: Ongoing      Problem: Cardiac Output - Decreased:  Goal: Cardiac output within specified parameters  Cardiac output within specified parameters   Outcome: Ongoing    Goal: Hemodynamic stability will improve  Hemodynamic stability will improve   Outcome: Ongoing      Problem: Fluid Volume - Imbalance:  Goal: Ability to achieve a balanced intake and output will improve  Ability to achieve a balanced intake and output will improve   Outcome: Ongoing    Goal: Chest tube drainage is within specified parameters  Chest tube drainage is within specified parameters   Outcome: Ongoing      Problem: Gas Exchange - Impaired:  Goal: Levels of oxygenation will improve  Levels of oxygenation will improve   Outcome: Ongoing    Goal: Ability to maintain adequate ventilation will improve  Ability to maintain adequate ventilation will improve   Outcome: Ongoing      Problem: Pain:  Goal: Pain level will decrease  Pain level will decrease    Outcome: Ongoing    Goal: Control of acute pain  Control of acute pain   Outcome: Ongoing    Goal: Control of chronic pain  Control of chronic pain   Outcome: Ongoing      Problem: Tissue Perfusion - Cardiopulmonary, Altered:  Goal: Absence of angina  Absence of angina   Outcome: Ongoing    Goal: Hemodynamic stability will improve  Hemodynamic stability will improve Outcome: Ongoing    Goal: Will show no evidence of cardiac arrhythmias  Will show no evidence of cardiac arrhythmias   Outcome: Ongoing      Problem: Tobacco Use:  Goal: Will participate in inpatient tobacco-use cessation counseling  Will participate in inpatient tobacco-use cessation counseling   Outcome: Ongoing      Problem: Risk for Impaired Skin Integrity  Goal: Tissue integrity - skin and mucous membranes  Structural intactness and normal physiological function of skin and  mucous membranes.    Outcome: Ongoing      Problem: Musculor/Skeletal Functional Status  Goal: Highest potential functional level  Outcome: Ongoing    Goal: Absence of falls  Outcome: Ongoing      Problem: Pain:  Goal: Control of acute pain  Control of acute pain   Outcome: Ongoing    Goal: Control of chronic pain  Control of chronic pain   Outcome: Ongoing    Goal: Pain level will decrease  Pain level will decrease    Outcome: Ongoing      Problem: Musculor/Skeletal Functional Status  Goal: Highest potential functional level  Outcome: Ongoing

## 2017-10-29 NOTE — PLAN OF CARE
Problem: Falls - Risk of:  Goal: Will remain free from falls  Will remain free from falls   Outcome: Ongoing

## 2017-10-29 NOTE — PROGRESS NOTES
Screening  Patient Currently in Pain: Yes  Pain Assessment  Pain Assessment: 0-10  Pain Level: 1  Pain Type: Surgical pain  Pain Location: Chest  Pain Frequency: Intermittent  Pain Intervention(s):  (rest breaks prn)  Vital Signs  Patient Currently in Pain: Yes  Orientation  WNL  Objective   Transfers  Sit to Stand: Modified independent  Stand to sit: Modified independent  Comment: toilet transfer modified independant  Ambulation  Ambulation?: Yes  Ambulation 1  Surface: level tile  Device: No Device  Assistance: Stand by assistance  Quality of Gait: fair, no LOB noted, even step lenght, steday pace  Distance: 300'  Comments: no noticeable SOB  Stairs/Curb  Stairs?: Yes  Stairs  # Steps : 8  Stairs Height: 6\"  Rails: Left ascending  Assistance: Stand by assistance  Comment: pt alternate LE when ascend/descend stairs  Balance  Posture: Good  Sitting - Static: Good  Sitting - Dynamic: Good  Standing - Static: Fair;+  Exercises  Comments: bilat UE/LE per cardiac packet 15 reps AROM (caridac & ROM restrictions abided)      Assessment   Body structures, Functions, Activity limitations: Decreased functional mobility ; Decreased balance;Decreased endurance  Assessment: pt dyana well, no LOB noted during amb  Prognosis: Good  Decision Making: Medium Complexity  Patient Education: pt educated on cardiac & pacemaker precautions  Activity Tolerance  Activity Tolerance: Patient limited by endurance; Patient limited by fatigue  Activity Tolerance: fair minus  PT Equipment Recommendations  Equipment Needed: No  Other: TBD       Discharge Recommendations:  Home with Home health PT    G-Code  OutComes Score      Goals  Short term goals  Time Frame for Short term goals: 20 visits  Short term goal 1: Pt will be I  with bed mobility  Short term goal 2: Pt will be I with transfers  Short term goal 3: Pt will be I with amb 300' without AD or Brenna with least restrictive AD    Plan    Plan  Times per week: QID 7x/wk  Current Treatment

## 2017-10-29 NOTE — CARE COORDINATION
Discharge planning-talked with patient-still planning on going home when discharged. [facility]

## 2017-10-29 NOTE — PLAN OF CARE
Christo Vick 18    Second Visit Note  For more detailed information please refer to the progress note of the day      10/29/2017    5:15 PM    Name:   Cathryn Diaz  MRN:     [id number]     Doris:      [de-identified]   Room:   Formerly named [facility] [place] [address] Day:  12  Admit Date:  10/17/2017  4:54 AM    PCP:   Sulma Zepeda MD  Code Status:  Full Code    No new issues since my visit  Seems stable EKG on sotalol  Rate well controlled  Possible discharge in next 48 hours      Kathy Velasco MD  10/29/2017  5:15 PM

## 2017-10-30 VITALS
SYSTOLIC BLOOD PRESSURE: 145 MMHG | HEIGHT: 69 IN | HEART RATE: 79 BPM | WEIGHT: 212.1 LBS | DIASTOLIC BLOOD PRESSURE: 80 MMHG | RESPIRATION RATE: 18 BRPM | OXYGEN SATURATION: 100 % | TEMPERATURE: 97.5 F | BODY MASS INDEX: 31.42 KG/M2

## 2017-10-30 PROBLEM — D62 ACUTE BLOOD LOSS AS CAUSE OF POSTOPERATIVE ANEMIA: Status: ACTIVE | Noted: 2017-10-30

## 2017-10-30 LAB
EKG ATRIAL RATE: 69 BPM
EKG ATRIAL RATE: 80 BPM
EKG P AXIS: 48 DEGREES
EKG P-R INTERVAL: 288 MS
EKG Q-T INTERVAL: 454 MS
EKG Q-T INTERVAL: 458 MS
EKG QRS DURATION: 154 MS
EKG QRS DURATION: 156 MS
EKG QTC CALCULATION (BAZETT): 528 MS
EKG QTC CALCULATION (BAZETT): 540 MS
EKG R AXIS: 104 DEGREES
EKG R AXIS: 75 DEGREES
EKG T AXIS: -19 DEGREES
EKG T AXIS: -4 DEGREES
EKG VENTRICULAR RATE: 80 BPM
EKG VENTRICULAR RATE: 85 BPM
HCT VFR BLD CALC: 23 % (ref 41–53)
HEMOGLOBIN: 7.6 G/DL (ref 13.5–17.5)
INR BLD: 2.4
MAGNESIUM: 2.2 MG/DL (ref 1.6–2.6)
MCH RBC QN AUTO: 28.6 PG (ref 26–34)
MCHC RBC AUTO-ENTMCNC: 33.2 G/DL (ref 31–37)
MCV RBC AUTO: 86.2 FL (ref 80–100)
PDW BLD-RTO: 15.6 % (ref 12.5–15.4)
PLATELET # BLD: 267 K/UL (ref 140–450)
PMV BLD AUTO: 8.7 FL (ref 6–12)
PROTHROMBIN TIME: 26.3 SEC (ref 9.4–12.6)
RBC # BLD: 2.67 M/UL (ref 4.5–5.9)
WBC # BLD: 7.5 K/UL (ref 3.5–11)

## 2017-10-30 PROCEDURE — 97116 GAIT TRAINING THERAPY: CPT

## 2017-10-30 PROCEDURE — 6370000000 HC RX 637 (ALT 250 FOR IP): Performed by: NURSE PRACTITIONER

## 2017-10-30 PROCEDURE — 83735 ASSAY OF MAGNESIUM: CPT

## 2017-10-30 PROCEDURE — C1785 PMKR, DUAL, RATE-RESP: HCPCS

## 2017-10-30 PROCEDURE — C1894 INTRO/SHEATH, NON-LASER: HCPCS

## 2017-10-30 PROCEDURE — 99232 SBSQ HOSP IP/OBS MODERATE 35: CPT | Performed by: INTERNAL MEDICINE

## 2017-10-30 PROCEDURE — 85610 PROTHROMBIN TIME: CPT

## 2017-10-30 PROCEDURE — 6360000002 HC RX W HCPCS: Performed by: NURSE PRACTITIONER

## 2017-10-30 PROCEDURE — 6370000000 HC RX 637 (ALT 250 FOR IP): Performed by: THORACIC SURGERY (CARDIOTHORACIC VASCULAR SURGERY)

## 2017-10-30 PROCEDURE — 2580000003 HC RX 258: Performed by: INTERNAL MEDICINE

## 2017-10-30 PROCEDURE — 97110 THERAPEUTIC EXERCISES: CPT

## 2017-10-30 PROCEDURE — C1779 LEAD, PMKR, TRANSVENOUS VDD: HCPCS

## 2017-10-30 PROCEDURE — 6370000000 HC RX 637 (ALT 250 FOR IP): Performed by: STUDENT IN AN ORGANIZED HEALTH CARE EDUCATION/TRAINING PROGRAM

## 2017-10-30 PROCEDURE — 36415 COLL VENOUS BLD VENIPUNCTURE: CPT

## 2017-10-30 PROCEDURE — 94762 N-INVAS EAR/PLS OXIMTRY CONT: CPT

## 2017-10-30 PROCEDURE — C1898 LEAD, PMKR, OTHER THAN TRANS: HCPCS

## 2017-10-30 PROCEDURE — 85027 COMPLETE CBC AUTOMATED: CPT

## 2017-10-30 RX ORDER — PSEUDOEPHEDRINE HCL 30 MG
100 TABLET ORAL 2 TIMES DAILY
COMMUNITY
Start: 2017-10-30 | End: 2019-07-09 | Stop reason: ALTCHOICE

## 2017-10-30 RX ORDER — OXYCODONE HYDROCHLORIDE AND ACETAMINOPHEN 5; 325 MG/1; MG/1
1 TABLET ORAL EVERY 4 HOURS PRN
Qty: 40 TABLET | Refills: 0 | Status: SHIPPED | OUTPATIENT
Start: 2017-10-30 | End: 2017-11-06

## 2017-10-30 RX ORDER — FUROSEMIDE 40 MG/1
40 TABLET ORAL 2 TIMES DAILY
Qty: 60 TABLET | Refills: 3 | Status: SHIPPED | OUTPATIENT
Start: 2017-10-30 | End: 2018-01-30 | Stop reason: DRUGHIGH

## 2017-10-30 RX ORDER — LANOLIN ALCOHOL/MO/W.PET/CERES
325 CREAM (GRAM) TOPICAL
Qty: 90 TABLET | Refills: 3 | Status: SHIPPED | OUTPATIENT
Start: 2017-10-30 | End: 2019-02-05 | Stop reason: ALTCHOICE

## 2017-10-30 RX ORDER — SIMVASTATIN 20 MG
20 TABLET ORAL NIGHTLY
Qty: 30 TABLET | Refills: 3 | Status: SHIPPED | OUTPATIENT
Start: 2017-10-30 | End: 2018-01-30

## 2017-10-30 RX ORDER — WARFARIN SODIUM 5 MG/1
TABLET ORAL
Qty: 30 TABLET | Refills: 3 | Status: SHIPPED | OUTPATIENT
Start: 2017-10-30 | End: 2018-01-30

## 2017-10-30 RX ORDER — WARFARIN SODIUM 5 MG/1
5 TABLET ORAL DAILY
Status: DISCONTINUED | OUTPATIENT
Start: 2017-10-30 | End: 2017-10-30 | Stop reason: HOSPADM

## 2017-10-30 RX ADMIN — CITALOPRAM 5 MG: 10 TABLET, FILM COATED ORAL at 08:24

## 2017-10-30 RX ADMIN — ENOXAPARIN SODIUM 100 MG: 100 INJECTION SUBCUTANEOUS at 08:22

## 2017-10-30 RX ADMIN — ASPIRIN 81 MG: 81 TABLET, COATED ORAL at 08:22

## 2017-10-30 RX ADMIN — SOTALOL HYDROCHLORIDE 40 MG: 80 TABLET ORAL at 08:26

## 2017-10-30 RX ADMIN — FUROSEMIDE 40 MG: 40 TABLET ORAL at 08:22

## 2017-10-30 RX ADMIN — FERROUS SULFATE TAB EC 325 MG (65 MG FE EQUIVALENT) 325 MG: 325 (65 FE) TABLET DELAYED RESPONSE at 13:21

## 2017-10-30 RX ADMIN — FERROUS SULFATE TAB EC 325 MG (65 MG FE EQUIVALENT) 325 MG: 325 (65 FE) TABLET DELAYED RESPONSE at 08:22

## 2017-10-30 RX ADMIN — MULTIPLE VITAMINS W/ MINERALS TAB 1 TABLET: TAB at 08:22

## 2017-10-30 RX ADMIN — FAMOTIDINE 20 MG: 20 TABLET, FILM COATED ORAL at 08:22

## 2017-10-30 RX ADMIN — Medication 10 ML: at 08:31

## 2017-10-30 ASSESSMENT — PAIN DESCRIPTION - LOCATION: LOCATION: CHEST

## 2017-10-30 ASSESSMENT — PAIN SCALES - GENERAL: PAINLEVEL_OUTOF10: 2

## 2017-10-30 ASSESSMENT — PAIN DESCRIPTION - DESCRIPTORS: DESCRIPTORS: ACHING

## 2017-10-30 ASSESSMENT — PAIN DESCRIPTION - PAIN TYPE: TYPE: SURGICAL PAIN

## 2017-10-30 NOTE — PROGRESS NOTES
CNP, 100 mg at 10/29/17 2038    sotalol (BETAPACE) tablet 40 mg, 40 mg, Oral, BID, Anitra Cabot, MD, 40 mg at 10/29/17 2139    docusate sodium (COLACE) capsule 100 mg, 100 mg, Oral, BID, Leopoldo Devonshire, CNP, 100 mg at 10/29/17 0816    polyethylene glycol (GLYCOLAX) packet 17 g, 17 g, Oral, Daily, Leopoldo Devonshire, CNP    vancomycin (VANCOCIN) 1000 mg in dextrose 5% 200 mL IVPB, 1,000 mg, Intravenous, Once, Shraddha Clarke MD    sodium chloride flush 0.9 % injection 10 mL, 10 mL, Intravenous, 2 times per day, Shraddha Clarke MD, 10 mL at 10/29/17 2039    sodium chloride flush 0.9 % injection 10 mL, 10 mL, Intravenous, PRN, Shraddha Clarke MD    acetaminophen (TYLENOL) tablet 650 mg, 650 mg, Oral, Q4H PRN, Shraddha Clarke MD, 650 mg at 10/29/17 2140    bacitracin 50,000 Units in sodium chloride 0.9 % 1,000 mL irrigation, 50,000 Units, Topical, Once, Shraddha Clarke MD    ALPRAZolam Rachael Blind) tablet 0.5 mg, 0.5 mg, Oral, BID PRN, Chemo Whitehead MD, 0.5 mg at 10/29/17 2140    famotidine (PEPCID) tablet 20 mg, 20 mg, Oral, BID, Chemo Whitehead MD, 20 mg at 10/29/17 2039    citalopram (CELEXA) tablet 5 mg, 5 mg, Oral, Daily, Chemo Whitehead MD, 5 mg at 10/29/17 0815    warfarin (COUMADIN) daily dosing (placeholder), , Other, RX Placeholder, Chemo Whitehead MD    metoclopramide (REGLAN) tablet 10 mg, 10 mg, Oral, Q6H PRN, Chemo Whitehead MD, 10 mg at 10/24/17 1354    sodium chloride flush 0.9 % injection 10 mL, 10 mL, Intravenous, PRN, Chemo Whitehead MD, 10 mL at 10/25/17 2009    calcium chloride 1 g in sodium chloride 0.9 % 100 mL IVPB, 1 g, Intravenous, PRN, Chemo Whitehead MD, Stopped at 10/23/17 1954    magnesium sulfate 1 g in dextrose 5% 100 mL IVPB, 1 g, Intravenous, PRN, Chemo Whitehead MD    potassium chloride 20 mEq/50 mL IVPB (Central Line), 20 mEq, Intravenous, PRN, Chemo Whitehead MD, Last Rate: 50 mL/hr at 10/24/17 0620, 20 mEq at 10/24/17 0620    acetaminophen (TYLENOL) tablet 650 mg, 650 mg,

## 2017-10-30 NOTE — PROGRESS NOTES
Christo Vick 19    Progress Note    10/30/2017    12:01 PM    Name:   Kevin Aden  MRN:     [id number]     Acct:      [de-identified]   Room:   [address] [place] [address] Day:  15  Admit Date:  10/17/2017  4:54 AM    PCP:   Junior Mike MD  Code Status:  Full Code    Subjective:     C/C:   Chief Complaint   Patient presents with    Shortness of Breath     worsening since wednesday, unlabored breathing, NAD, cardiac hx     Interval History Status: improved. Continued to feel better  Heart rate well controlled overnight  Able to eat and drink well    Brief History:     Kevin Aden 61 y.o. male   is admitted to the hospital for the management of  Acute diastolic CHF, moderate mitral regurgitation. Patient came to emergency room for shortness of breath.  He reported that he has been having difficulty in breathing since May 2017.  Patient followed with his cardiologist in July and had stress test and echocardiogram. [facility] was found to have moderate mitral regurgitation.  Patient has underlying history of mitral valve replacement in February 2007. [facility] has history of paroxysmal atrial fibrillation and has been on sotalol and atenolol.  Patient had unsuccessful ablation before. He also had Maze procedure performed during surgery 2007. Patient takes aspirin 81 mg daily. [facility] reported cough, with clear sputum, orthopnea, PND. Initial evaluation in the emergency room showed heart rate 130s.  Troponins negative.  CT chest was performed to rule out pulmonary embolism and did not show any central or subsegmental pulmonary emboli.  Patient was given Lasix and admitted for further evaluation and treatment for acute CHF. Patient had atrial fibrillation and was started on Cardizem infusion and heparin low-dose infusion. Underwent trans-esophageal echocardiogram on 10/18/17 which showed dysfunctional bioprosthetic mitral valve , mean gradient 15 mmHg.   Patient was recommended redo surgery. Patient underwent cardiac cath on 10/19/17 and showed normal coronaries. Patient underwent surgery on 10/23/17 and had mechanical valve placed with Razo Maze . Abigail Sprung Postoperatively he needed to 3 pressors for blood pressure control. He was also given amiodarone infusion for Vtach . Patient was weaned of pressors. Extubated on 10/24/17 . Remy Dimas removed on 10/24/17. Patient has complete AV block with AV pacing and was placed on external pacer via epicardial leads. PPM placed 10/27/17. Chest tube removed 10/25/17. Advised multiple medication changes at discharge    Review of Systems:     Constitutional:  negative for chills, fevers, sweats  Respiratory: mild cough last night, exertional SOB   Cardiovascular:  negative for chest pain, chest pressure/discomfort, has mild lower leg swelling  Gastrointestinal:  negative for abdominal pain, constipation, diarrhea, nausea, vomiting  Neurological:  negative for dizziness, headache    Medications:      Allergies:  No Known Allergies    Current Meds:   Scheduled Meds:    warfarin  5 mg Oral Daily    ferrous sulfate  325 mg Oral TID WC    furosemide  40 mg Oral BID    sotalol  40 mg Oral BID    docusate sodium  100 mg Oral BID    polyethylene glycol  17 g Oral Daily    vancomycin  1,000 mg Intravenous Once    sodium chloride flush  10 mL Intravenous 2 times per day    bacitracin in 0.9 % sodium chloride irrigation  50,000 Units Topical Once    famotidine  20 mg Oral BID    citalopram  5 mg Oral Daily    warfarin (COUMADIN) daily dosing (placeholder)   Other RX Placeholder    therapeutic multivitamin-minerals  1 tablet Oral Daily with breakfast    simvastatin  20 mg Oral Nightly    aspirin  81 mg Oral Daily     Continuous Infusions:    hetastarch 6% in 0.9% NaCl infusion 500 mL      dextrose       PRN Meds: sodium chloride flush, acetaminophen, ALPRAZolam, metoclopramide, sodium chloride flush, calcium chloride IVPB, magnesium sulfate, potassium chloride, acetaminophen, fentanNYL, diphenhydrAMINE, magnesium hydroxide, ondansetron, hydrALAZINE, hetastarch 6% in 0.9% NaCl infusion 500 mL, glucose, dextrose, glucagon (rDNA), dextrose, oxyCODONE-acetaminophen    Data:     Past Medical History:   has a past medical history of Acute diastolic CHF (congestive heart failure) ([address] Social History:   reports that he has never smoked. He has never used smokeless tobacco. He reports that he does not drink alcohol or use drugs. Family History: History reviewed. No pertinent family history. Vitals:  BP (!) 145/80   Pulse 79   Temp 97.5 °F (36.4 °C) (Oral)   Resp 18   Ht 5' 9\" (1.753 m)   Wt 212 lb 1.6 oz (96.2 kg)   SpO2 100%   BMI 31.32 kg/m²   Temp (24hrs), Av.8 °F (36.6 °C), Min:97.5 °F (36.4 °C), Max:98 °F (36.7 °C)    No results for input(s): POCGLU in the last 72 hours. I/O (24Hr):     Intake/Output Summary (Last 24 hours) at 10/30/17 1201  Last data filed at 10/30/17 0631   Gross per 24 hour   Intake              480 ml   Output             2250 ml   Net            -1770 ml     Labs:    Lab Results   Component Value Date/Time    SPECIAL NOT REPORTED 10/23/2017 08:13 AM     Lab Results   Component Value Date/Time    CULTURE NO GROWTH 10/23/2017 08:13 AM    CULTURE  10/23/2017 08:13 AM     [employer] [address] [address] [phone]       Lab Results   Component Value Date    POCPH 7.380 10/23/2017    POCPCO2 36.2 10/23/2017    POCPO2 103.0 10/23/2017    POCHCO3 21.4 10/23/2017    NBEA 3 10/23/2017    PBEA NOT REPORTED 10/23/2017    KIX6FIC 23 10/23/2017    WPMJ2WHE 98 10/23/2017    FIO2 40.0 10/23/2017     Physical Examination:        General appearance:  alert, cooperative and no distress  Mental Status:  oriented to person, place and time and normal affect  Lungs:  clear to auscultation bilaterally, normal effort  Heart:  Loud S1, no murmur noted  Abdomen:  soft, nontender, nondistended, normal bowel sounds, no masses, hepatomegaly, splenomegaly  Extremities:  Mild leg swelling of both arms and legs noted  Skin:  no gross lesions, rashes, induration    Assessment:        Primary Problem  Acute diastolic CHF (congestive heart failure) [facility])    Active Hospital Problems    Diagnosis Date Noted    Acute diastolic CHF (congestive heart failure) (HCC) [I50.31] 10/17/2017    Moderate mitral regurgitation [I34.0] 10/17/2017    Paroxysmal atrial fibrillation ([address] [I48.0] 10/17/2017    Prolonged QT interval [R94.31]  Dysfunctional prosthetic mitral valve 10/17/2017     Plan:        Principal Problem:    Acute diastolic CHF (congestive heart failure) ([address] from prosthetic valve dysfunction, status post valve replacement during admission    Anemia of acute blood loss, post operatively. Pacemaker Implant - Dual 10/27/17-Dr. Juan Moreno     Moderate mitral regurgitation    Paroxysmal atrial fibrillation ([address] INR at 2.4 today    Prolonged QT interval    H/O mitral valve replacement with mechanical valve during this admission    Postoperative complete heart block ([address] status post PPM placement    Discharge per CT surgery.  Possible discharge in next 24 hours      Dex Barroso MD  10/30/2017  12:01 PM

## 2017-10-30 NOTE — CARE COORDINATION
Met with patient and wife to discuss discharge planning. Plan is home with [address] Arabella Jose spoke with Maya Chandra at [facility] [place], they are accepting for [address].   Dc, H&P, AVS, notes faxed to [phone]

## 2017-10-30 NOTE — PROGRESS NOTES
Physical Therapy  Facility/Department: [facility]  Daily Treatment Note  NAME: Kaveh Lugo  : 1960  MRN: [id number]    Date of Service: 10/30/2017    Patient Diagnosis(es):   Patient Active Problem List    Diagnosis Date Noted    Pacemaker Implant - Dual 10/27/17-Dr. Gavino Maldonado 10/27/2017     Priority: High    H/O mitral valve replacement with mechanical valve 10/28/2017    Postoperative complete heart block (HCC) 10/28/2017    Acute congestive heart failure (HCC)     Acute diastolic CHF (congestive heart failure) ([address] 10/17/2017    Moderate mitral regurgitation 10/17/2017    Paroxysmal atrial fibrillation ([address] 10/17/2017    Prolonged QT interval 10/17/2017       Past Medical History:   Diagnosis Date    Acute diastolic CHF (congestive heart failure) ([address] 10/17/2017     Past Surgical History:   Procedure Laterality Date    CARDIAC CATHETERIZATION  10/19/2017    right and left heart cath Dr. Gloria Villa  2007    MITRAL VALVE REPLACEMENT N/A 10/23/2017    REDO STERNOTOMY, MITRAL VALVE REPLACEMENT 27MM MEDTRONIC OPEN PIVOT MECHANICAL, ON PUMP, SWAN, ALEENA performed by David Corea MD at [address] [place] [address] TRANSESOPHAGEAL ECHOCARDIOGRAM  10-18-19       Restrictions  Restrictions/Precautions  Restrictions/Precautions: ROM Restrictions  Required Braces or Orthoses?: No  Implants present? : Pacemaker  Upper Extremity Weight Bearing Restrictions  Other: ROM to 90 only, due to pace maker 10/27  Position Activity Restriction  Sternal Precautions: No Pushing, No Pulling, 5# Lifting Restrictions  Sternal Precautions: MVR w/Re-do Sternotomy 10/23/17  Other position/activity restrictions: Chest tubes, IV  Subjective   General  Chart Reviewed: Yes  Response To Previous Treatment: Patient with no complaints from previous session. Family / Caregiver Present: Yes (wife)  Subjective  Subjective: Pt alert in bed. Reports minimal pain and is agreeable to PT.   General Comment  Comments: Pt left in chair at end of session  Pain Screening  Patient Currently in Pain: Yes  Pain Assessment  Pain Assessment: 0-10  Pain Level: 2  Pain Type: Surgical pain  Pain Location: Chest  Pain Descriptors: Aching  Pain Intervention(s): Medication (see eMar);Repositioned; Ambulation/Increased activity  Vital Signs  Patient Currently in Pain: Yes       Orientation  Orientation  Overall Orientation Status: Within Normal Limits  Objective   Bed mobility  Rolling to Right: Modified independent  Supine to Sit: Modified independent  Scooting: Modified independent  Transfers  Sit to Stand: Modified independent  Stand to sit: Modified independent  Ambulation  Ambulation?: Yes  Ambulation 1  Surface: level tile  Device: No Device  Other Apparatus: Wheelchair follow  Assistance: Stand by assistance  Quality of Gait: decreased pace  Distance: 300ft  Comments: 2 standing rest breaks d/t fatigue  Stairs/Curb  Stairs?: Yes  Stairs  # Steps : 8  Stairs Height: 6\"  Rails: Left ascending  Device: No Device  Assistance: Stand by assistance     Balance  Posture: Good  Sitting - Dynamic: Good  Standing - Static: Fair;+  Standing - Dynamic: Fair;+        Exercises:  Seated LE exercise program: Long Arc Quads, hip abduction/adduction, heel/toe raises, and marches. Reps: 20x each, min cues for exercise recall  Upper extremity exercises: Bicep curl, shoulder flexion/extension, punches, tricep curl, shoulder abduction/adduction. Reps: 20x each with pacemaker precautions on LUE        Assessment   Body structures, Functions, Activity limitations: Decreased functional mobility ; Decreased endurance  Assessment: Pt ambulating functional distances with no LOB and able to navigate 8 stairs with SBA.    Prognosis: Good  REQUIRES PT FOLLOW UP: Yes  Activity Tolerance  Activity Tolerance: Patient Tolerated treatment well       Discharge Recommendations:  Home with Home health PT          Goals  Short term goals  Time Frame for Short term goals: 20

## 2017-10-30 NOTE — PLAN OF CARE
Problem: Safety:  Goal: Free from accidental physical injury  Free from accidental physical injury   Outcome: Ongoing      Problem: Falls - Risk of:  Goal: Absence of physical injury  Absence of physical injury   Outcome: Ongoing      Problem: Falls - Risk of  Goal: Absence of falls  Outcome: Ongoing      Problem: Risk for Impaired Skin Integrity  Goal: Tissue integrity - skin and mucous membranes  Structural intactness and normal physiological function of skin and  mucous membranes.    Outcome: Ongoing

## 2017-10-31 ENCOUNTER — ANTI-COAG VISIT (OUTPATIENT)
Dept: CARDIOTHORACIC SURGERY | Age: 57
End: 2017-10-31

## 2017-10-31 DIAGNOSIS — Z95.2 H/O MITRAL VALVE REPLACEMENT WITH MECHANICAL VALVE: ICD-10-CM

## 2017-10-31 LAB
EKG ATRIAL RATE: 116 BPM
EKG P AXIS: 2 DEGREES
EKG P-R INTERVAL: 92 MS
EKG Q-T INTERVAL: 364 MS
EKG QRS DURATION: 140 MS
EKG QTC CALCULATION (BAZETT): 505 MS
EKG R AXIS: 118 DEGREES
EKG T AXIS: 69 DEGREES
EKG VENTRICULAR RATE: 116 BPM

## 2017-11-01 ENCOUNTER — ANTI-COAG VISIT (OUTPATIENT)
Dept: CARDIOTHORACIC SURGERY | Age: 57
End: 2017-11-01

## 2017-11-01 DIAGNOSIS — Z95.2 H/O MITRAL VALVE REPLACEMENT WITH MECHANICAL VALVE: ICD-10-CM

## 2017-11-01 LAB
INR BLD: 3.7
INR BLD: 3.7

## 2017-11-06 ENCOUNTER — ANTI-COAG VISIT (OUTPATIENT)
Dept: CARDIOTHORACIC SURGERY | Age: 57
End: 2017-11-06

## 2017-11-06 DIAGNOSIS — Z95.2 H/O MITRAL VALVE REPLACEMENT WITH MECHANICAL VALVE: ICD-10-CM

## 2017-11-06 LAB — INR BLD: 2.4

## 2017-11-06 NOTE — PROGRESS NOTES
INR 2.4. Please have pt take 5 mg (1 tab) tonight and Wednesday, 2.5 mg (.5 tab) tomorrow and Thursday.   Next INR draw Friday

## 2017-11-08 ENCOUNTER — OFFICE VISIT (OUTPATIENT)
Dept: CARDIOTHORACIC SURGERY | Age: 57
End: 2017-11-08

## 2017-11-08 VITALS
HEIGHT: 69 IN | BODY MASS INDEX: 30.21 KG/M2 | OXYGEN SATURATION: 98 % | HEART RATE: 76 BPM | DIASTOLIC BLOOD PRESSURE: 82 MMHG | SYSTOLIC BLOOD PRESSURE: 131 MMHG | RESPIRATION RATE: 16 BRPM | WEIGHT: 204 LBS | TEMPERATURE: 97.2 F

## 2017-11-08 DIAGNOSIS — Z95.2 H/O MITRAL VALVE REPLACEMENT WITH MECHANICAL VALVE: Primary | ICD-10-CM

## 2017-11-08 PROCEDURE — 99024 POSTOP FOLLOW-UP VISIT: CPT | Performed by: NURSE PRACTITIONER

## 2017-11-08 RX ORDER — OXYCODONE HYDROCHLORIDE AND ACETAMINOPHEN 5; 325 MG/1; MG/1
1 TABLET ORAL EVERY 4 HOURS PRN
COMMUNITY
End: 2018-01-24

## 2017-11-09 NOTE — LETTER
[address]    Filomena Petersen, [address] [facility]. [address]  [facility], [facility]  Fax: [phone]       Dear Dr. Shay Clarke,      Your patient Fabian Butterfield ( 1960) underwent a Redo MVR w/ 27mm ATS Mechanical Prosthesis and Maze on 10/23/2017 . Venkat Galindo is recovering at [facility], and we will keep you updated on his progress. As always, thank you for allowing me to participate in the care of your patient. Please feel free to call me with any questions or concerns. Sincerely,      Chris Love M.D. Cell:  [phone] Email:  [email or web address]    Cc:    Junior Mike MD  Fax: [phone]

## 2017-11-09 NOTE — LETTER
Care you can believe in. ®                    11/09/17    Betty Commander  [address]  [facility] [address]        Dear Mr. Sherwin Mari you will find your surgery identification card, which has a brief description of your recent surgery. Please carry this card with you in your wallet or purse. This card is intended to be used by physicians and you may or may not understand the description. If you choose a different Cardiologist, go to the emergency department, or relocate, please show this card to your physician in order to help them and the healthcare team understand your medical history. If you have sternal wires in your chest from cardiac surgery, it may cause metal detectors to go off (at the airport or security stations). Please show them your card, indicating that you had surgery. Thank you for putting your trust in us.     Wishing you health,      The Physicians and Staff of [facility]

## 2017-11-10 ENCOUNTER — HOSPITAL ENCOUNTER (OUTPATIENT)
Dept: GENERAL RADIOLOGY | Facility: CLINIC | Age: 57
Discharge: HOME | End: 2017-11-10
Payer: COMMERCIAL

## 2017-11-10 ENCOUNTER — HOSPITAL ENCOUNTER (OUTPATIENT)
Age: 57
Discharge: HOME | End: 2017-11-10
Payer: COMMERCIAL

## 2017-11-10 ENCOUNTER — ANTI-COAG VISIT (OUTPATIENT)
Dept: CARDIOTHORACIC SURGERY | Age: 57
End: 2017-11-10

## 2017-11-10 DIAGNOSIS — Z95.2 HISTORY OF MVR WITH CARDIOPULMONARY BYPASS: ICD-10-CM

## 2017-11-10 DIAGNOSIS — Z95.2 H/O MITRAL VALVE REPLACEMENT WITH MECHANICAL VALVE: ICD-10-CM

## 2017-11-10 LAB — INR BLD: 2.9

## 2017-11-10 PROCEDURE — 71020 XR CHEST STANDARD TWO VW: CPT

## 2017-11-17 ENCOUNTER — ANTI-COAG VISIT (OUTPATIENT)
Dept: CARDIOTHORACIC SURGERY | Age: 57
End: 2017-11-17

## 2017-11-17 DIAGNOSIS — Z95.2 H/O MITRAL VALVE REPLACEMENT WITH MECHANICAL VALVE: ICD-10-CM

## 2017-11-17 LAB — INR BLD: 2.6

## 2017-11-28 NOTE — DISCHARGE SUMMARY
[facility]  Discharge Summary    Patient's Name/Date of Birth: Subhash Allred / 1960 (47 y.o.)    Discharge Date: October 30, 2017   Discharge Physician: Jacqueline Mcneil  Discharge Unit: [address] [place] [address]    Reason For Admission:   Chief Complaint   Patient presents with    Shortness of Breath     worsening since wednesday, unlabored breathing, NAD, cardiac hx       HPI: Subhash Allred is a 61 y.o.  male who was admitted 10/14/2017 with symptoms of congestive heart failure-fluid overload and SOB. Had difficulties breathing since above  May 2017. He had underwent mitral valve replacement with bioprosthesis in 2007 for regurgitation. ALEENA on 10/18 showed thickened leaflets of the new valve. Had been on sotalol and atenolol at home for paroxysmal afib    Brief Review of Hospital Course:   During his admission, Mr Thomas Quintanilla was treated with diuresis, afterload reduction for acute diastolic failure. Cardiac cath 10/19/2017 shows clean coronaries with EF 50%. His MVR with mechanical valve, MAZE, and reclosure of ALYCE was 10/23/2017. During his postoperative course, patient was actively diuresed with Lasix. He was administered subcu Lovenox 1 mg/kg to bridge him to a INR goal of 2.5 to 3 with warfarin. Sustained some soft pressures during his postoperative period that resolved on its own. Poor cardiac present postoperative requiring a permanent pacemaker that was placed 10/28/2017. Pacer interrogation passed. Postoperative ef is 50% on echo. Patient discharged with INR 2.4 today.        ROS:   CONSTITUTIONAL:  Denies: fever, chills, weakness, weight gain  Respiratory: negative for cough, dyspnea on exertion, shortness of breath and wheezing  Cardiovascular: negative for chest pain, fatigue, irregular heart beat, lower extremity edema, near-syncope and palpitations  Gastrointestinal: negative for abdominal pain, constipation, diarrhea, nausea and vomiting and melena  Genitourinary:negative for dysuria or hematuria  Hematologic/lymphatic: negative for bleeding  Musculoskeletal:negative for muscle weakness and myalgias  Neurological: negative for coordination problems, dizziness, gait problems, paresthesia and weakness  Endocrine: negative    Physical Exam:  Vitals:    10/30/17 0645   BP: (!) 145/80   Pulse: 79   Resp: 18   Temp: 97.5 °F (36.4 °C)   SpO2: 100%     Weight: Weight: 212 lb 1.6 oz (96.2 kg)    Weight: 110 lb (49.9 kg)    No intake/output data recorded. General: Alert and Oriented x3. Up in chair. No apparent distress. HEENT:  Normocephalic and atraumatic. PERRL. EOMI. Lips and oral mucosa moist and without lesions. Neck:  Supple. Trachea midline. Chest:  No abnormality. Equal and symmetric expansion with respiration. Lungs:  Clear throughout  Cardiac:  Paced via perm pacer  Abdomen:  Soft, non-tender, normoactive bowel sounds. No masses or organomegaly. Extremities:  Trace generalized edema. Intact pulses in all four extremities. Musculoskeletal:  Intact range of motion of peripheral joints. grossly normal  Neurologic:  Non-focal sensory deficits on exam.  Psychiatric: Mood and affect are appropriate. Surgical Wounds: Surgical incisions with clean, dry, intact dressings in place. Pacer site soft      Past Medical History:   Diagnosis Date    Acute diastolic CHF (congestive heart failure) ([facility] [address] 10/17/2017     Past Surgical History:   Procedure Laterality Date    CARDIAC CATHETERIZATION  10/19/2017    right and left heart cath Dr. Jeremy Francis  02/2007    MITRAL VALVE REPLACEMENT N/A 10/23/2017    REDO STERNOTOMY, MITRAL VALVE REPLACEMENT 27MM MEDTRONIC OPEN PIVOT MECHANICAL, ON PUMP, SWAN, ALEENA performed by Kristen Izaguirre MD at [address] [place] [address] TRANSESOPHAGEAL ECHOCARDIOGRAM  10-18-19     No Known Allergies  History reviewed. No pertinent family history.   Social History     Social History    Marital status:      Spouse name: N/A    Number of children: N/A    Years of education: N/A     Occupational History    Not on file. Social History Main Topics    Smoking status: Never Smoker    Smokeless tobacco: Never Used    Alcohol use No    Drug use: No    Sexual activity: Not on file     Other Topics Concern    Not on file     Social History Narrative    No narrative on file          Medication List      START taking these medications    docusate 100 MG Caps  Commonly known as:  COLACE, DULCOLAX  Take 100 mg by mouth 2 times daily     ferrous sulfate 325 (65 Fe) MG EC tablet  Take 1 tablet by mouth 3 times daily (with meals)     furosemide 40 MG tablet  Commonly known as:  LASIX  Take 1 tablet by mouth 2 times daily     simvastatin 20 MG tablet  Commonly known as:  ZOCOR  Take 1 tablet by mouth nightly     warfarin 5 MG tablet  Commonly known as:  COUMADIN  Take one tab daily unless instructed otherwise after INR draws. CONTINUE taking these medications    aspirin 81 MG tablet     CELEXA PO     sotalol 40 MG split tablet  Commonly known as:  BETAPACE        STOP taking these medications    atenolol 25 MG tablet  Commonly known as:  TENORMIN        ASK your doctor about these medications    oxyCODONE-acetaminophen 5-325 MG per tablet  Commonly known as:  PERCOCET  Take 1 tablet by mouth every 4 hours as needed for Pain . Ask about: Should I take this medication? Where to Get Your Medications      You can get these medications from any pharmacy    Bring a paper prescription for each of these medications  · ferrous sulfate 325 (65 Fe) MG EC tablet  · furosemide 40 MG tablet  · oxyCODONE-acetaminophen 5-325 MG per tablet  · simvastatin 20 MG tablet  · warfarin 5 MG tablet  You don't need a prescription for these medications  · docusate 100 MG Caps           Data:    CBC: No results for input(s): WBC, HGB, HCT, MCV, PLT in the last 72 hours.   BMP: No results for input(s): NA, K, CL, CO2, PHOS, BUN, CREATININE, MG in the last 72 hours.    Invalid input(s): CA  Accucheck Glucoses:   No results for input(s): POCGLU in the last 72 hours. Cardiac Enzymes: No results for input(s): CKTOTAL, CKMB, CKMBINDEX, TROPONINI in the last 72 hours. PTT/PT/INR:   No results for input(s): PROTIME, INR in the last 72 hours. No results for input(s): APTT in the last 72 hours. Liver Profile:  Lab Results   Component Value Date    AST 35 10/21/2017    ALT 91 10/21/2017    BILITOT 1.19 10/21/2017    ALKPHOS 77 10/21/2017     Lab Results   Component Value Date    CHOL 193 10/17/2017    HDL 37 10/17/2017    TRIG 131 10/17/2017     TSH:   Lab Results   Component Value Date    TSH 2.00 10/17/2017     UA:   Lab Results   Component Value Date    COLORU YELLOW 10/21/2017    PHUR 7.0 10/21/2017    SPECGRAV 1.008 10/21/2017    LEUKOCYTESUR NEGATIVE 10/21/2017    UROBILINOGEN Normal 10/21/2017    BILIRUBINUR NEGATIVE 10/21/2017    GLUCOSEU NEGATIVE 10/21/2017       Active Hospital Problems    Diagnosis Date Noted    Pacemaker Implant - Dual 10/27/17-Dr. Merna Dennis [Z95.0] 10/27/2017     Priority: High    Acute blood loss as cause of postoperative anemia [D62] 10/30/2017    H/O mitral valve replacement with mechanical valve [Z95.2] 10/28/2017    Postoperative complete heart block ([address] [I44.2, Z98.890] 10/28/2017    Acute diastolic CHF (congestive heart failure) (HCC) [I50.31] 10/17/2017    Moderate mitral regurgitation [I34.0] 10/17/2017    Paroxysmal atrial fibrillation (HCC) [I48.0] 10/17/2017    Prolonged QT interval [R94.31] 10/17/2017       Imaging Studies:  Cardiac Cath: 10/19/2017  Angiographic Findings      Cardiac Arteries and Lesion Findings     LMCA: Normal 0% stenosis. LAD: Mild irregularities 10-20%. LCx: Mild irregularities 10-20%. RCA: Mild irregularities 10-20%. Ramus: Mild irregularities 10-20%.     Coronary Tree      Dominance: Right     Echo: 10/18/2017  Summary  The study was performed by the attending and the sonographer.   The study

## 2017-12-01 ENCOUNTER — ANTI-COAG VISIT (OUTPATIENT)
Dept: CARDIOTHORACIC SURGERY | Age: 57
End: 2017-12-01

## 2017-12-01 DIAGNOSIS — Z95.2 H/O MITRAL VALVE REPLACEMENT WITH MECHANICAL VALVE: Primary | ICD-10-CM

## 2017-12-01 LAB — INR BLD: 2.6

## 2017-12-01 NOTE — PROGRESS NOTES
Fransisco Calderon at [facility] [place] notified of instructions. Patient wishes to go to [facility] for Coumadin monitoring from now on; patient being discharged from [place] [address] services 12/6/17. Referral order entered into this encounter.  Fransisco Calderon will notify patient of instructions and referral.

## 2017-12-05 NOTE — ADDENDUM NOTE
Encounter addended by: Claudia Lopez MA on: 12/5/2017  3:52 PM<BR>    Actions taken: Letter status changed

## 2017-12-13 ENCOUNTER — HOSPITAL ENCOUNTER (INPATIENT)
Dept: CARDIAC CATH/INVASIVE PROCEDURES | Age: 57
LOS: 1 days | Discharge: HOME | DRG: 261 | End: 2017-12-14
Attending: INTERNAL MEDICINE | Admitting: INTERNAL MEDICINE
Payer: COMMERCIAL

## 2017-12-13 DIAGNOSIS — I48.0 PAROXYSMAL ATRIAL FIBRILLATION (HCC): Primary | Chronic | ICD-10-CM

## 2017-12-13 DIAGNOSIS — Z95.0 PACEMAKER: ICD-10-CM

## 2017-12-13 PROBLEM — T82.120A ATRIAL PACEMAKER LEAD DISPLACEMENT: Status: ACTIVE | Noted: 2017-12-13

## 2017-12-13 LAB
ANION GAP SERPL CALCULATED.3IONS-SCNC: 13 MMOL/L (ref 9–17)
BUN BLDV-MCNC: 31 MG/DL (ref 6–20)
BUN/CREAT BLD: ABNORMAL (ref 9–20)
CALCIUM SERPL-MCNC: 9 MG/DL (ref 8.6–10.4)
CHLORIDE BLD-SCNC: 105 MMOL/L (ref 98–107)
CO2: 26 MMOL/L (ref 20–31)
CREAT SERPL-MCNC: 0.78 MG/DL (ref 0.7–1.2)
GFR AFRICAN AMERICAN: >60 ML/MIN
GFR NON-AFRICAN AMERICAN: >60 ML/MIN
GFR SERPL CREATININE-BSD FRML MDRD: ABNORMAL ML/MIN/{1.73_M2}
GFR SERPL CREATININE-BSD FRML MDRD: ABNORMAL ML/MIN/{1.73_M2}
GLUCOSE BLD-MCNC: 100 MG/DL (ref 70–99)
HCT VFR BLD CALC: 40.8 % (ref 40.7–50.3)
HEMOGLOBIN: 12.9 G/DL (ref 13–17)
MCH RBC QN AUTO: 27.6 PG (ref 25.2–33.5)
MCHC RBC AUTO-ENTMCNC: 31.6 G/DL (ref 28.4–34.8)
MCV RBC AUTO: 87.2 FL (ref 82.6–102.9)
PDW BLD-RTO: 13.2 % (ref 11.8–14.4)
PLATELET # BLD: 149 K/UL (ref 138–453)
PMV BLD AUTO: 11.6 FL (ref 8.1–13.5)
POC INR: 1.1
POTASSIUM SERPL-SCNC: 4.4 MMOL/L (ref 3.7–5.3)
PROTHROMBIN TIME, POC: 13.2 SEC (ref 10.4–14.2)
RBC # BLD: 4.68 M/UL (ref 4.21–5.77)
SODIUM BLD-SCNC: 144 MMOL/L (ref 135–144)
WBC # BLD: 5 K/UL (ref 3.5–11.3)

## 2017-12-13 PROCEDURE — 2720000010 HC SURG SUPPLY STERILE

## 2017-12-13 PROCEDURE — 7100000010 HC PHASE II RECOVERY - FIRST 15 MIN

## 2017-12-13 PROCEDURE — 85027 COMPLETE CBC AUTOMATED: CPT

## 2017-12-13 PROCEDURE — 80048 BASIC METABOLIC PNL TOTAL CA: CPT

## 2017-12-13 PROCEDURE — 1200000000 HC SEMI PRIVATE

## 2017-12-13 PROCEDURE — 7100000011 HC PHASE II RECOVERY - ADDTL 15 MIN

## 2017-12-13 PROCEDURE — 02WA3MZ REVISION OF CARDIAC LEAD IN HEART, PERCUTANEOUS APPROACH: ICD-10-PCS | Performed by: INTERNAL MEDICINE

## 2017-12-13 PROCEDURE — 6370000000 HC RX 637 (ALT 250 FOR IP): Performed by: INTERNAL MEDICINE

## 2017-12-13 PROCEDURE — 85610 PROTHROMBIN TIME: CPT

## 2017-12-13 PROCEDURE — 6370000000 HC RX 637 (ALT 250 FOR IP)

## 2017-12-13 PROCEDURE — 2580000003 HC RX 258

## 2017-12-13 PROCEDURE — 6360000002 HC RX W HCPCS

## 2017-12-13 PROCEDURE — 33215 REPOSITION PACING-DEFIB LEAD: CPT | Performed by: INTERNAL MEDICINE

## 2017-12-13 PROCEDURE — 2500000003 HC RX 250 WO HCPCS

## 2017-12-13 PROCEDURE — 2580000003 HC RX 258: Performed by: INTERNAL MEDICINE

## 2017-12-13 PROCEDURE — A4364 ADHESIVE, LIQUID OR EQUAL: HCPCS

## 2017-12-13 RX ORDER — ACETAMINOPHEN 325 MG/1
650 TABLET ORAL EVERY 4 HOURS PRN
Status: DISCONTINUED | OUTPATIENT
Start: 2017-12-13 | End: 2017-12-14 | Stop reason: HOSPADM

## 2017-12-13 RX ORDER — SODIUM CHLORIDE 0.9 % (FLUSH) 0.9 %
10 SYRINGE (ML) INJECTION PRN
Status: DISCONTINUED | OUTPATIENT
Start: 2017-12-13 | End: 2017-12-14 | Stop reason: HOSPADM

## 2017-12-13 RX ORDER — LANOLIN ALCOHOL/MO/W.PET/CERES
325 CREAM (GRAM) TOPICAL
Status: DISCONTINUED | OUTPATIENT
Start: 2017-12-13 | End: 2017-12-14 | Stop reason: HOSPADM

## 2017-12-13 RX ORDER — OXYCODONE HYDROCHLORIDE AND ACETAMINOPHEN 5; 325 MG/1; MG/1
2 TABLET ORAL EVERY 6 HOURS PRN
Status: DISCONTINUED | OUTPATIENT
Start: 2017-12-13 | End: 2017-12-14 | Stop reason: HOSPADM

## 2017-12-13 RX ORDER — SIMVASTATIN 20 MG
20 TABLET ORAL NIGHTLY
Status: DISCONTINUED | OUTPATIENT
Start: 2017-12-13 | End: 2017-12-14 | Stop reason: HOSPADM

## 2017-12-13 RX ORDER — CITALOPRAM 10 MG/1
5 TABLET ORAL DAILY
Status: DISCONTINUED | OUTPATIENT
Start: 2017-12-13 | End: 2017-12-14 | Stop reason: HOSPADM

## 2017-12-13 RX ORDER — FUROSEMIDE 40 MG/1
40 TABLET ORAL 2 TIMES DAILY
Status: DISCONTINUED | OUTPATIENT
Start: 2017-12-13 | End: 2017-12-14 | Stop reason: HOSPADM

## 2017-12-13 RX ORDER — SOTALOL HYDROCHLORIDE 80 MG/1
40 TABLET ORAL 2 TIMES DAILY
Status: DISCONTINUED | OUTPATIENT
Start: 2017-12-13 | End: 2017-12-14 | Stop reason: HOSPADM

## 2017-12-13 RX ORDER — MIDODRINE HYDROCHLORIDE 2.5 MG/1
2.5 TABLET ORAL 3 TIMES DAILY
Status: DISCONTINUED | OUTPATIENT
Start: 2017-12-13 | End: 2017-12-14 | Stop reason: HOSPADM

## 2017-12-13 RX ORDER — WARFARIN SODIUM 5 MG/1
5 TABLET ORAL
Status: COMPLETED | OUTPATIENT
Start: 2017-12-13 | End: 2017-12-13

## 2017-12-13 RX ORDER — ONDANSETRON 2 MG/ML
4 INJECTION INTRAMUSCULAR; INTRAVENOUS EVERY 6 HOURS PRN
Status: DISCONTINUED | OUTPATIENT
Start: 2017-12-13 | End: 2017-12-14 | Stop reason: HOSPADM

## 2017-12-13 RX ORDER — DOCUSATE SODIUM 100 MG/1
100 CAPSULE, LIQUID FILLED ORAL 2 TIMES DAILY
Status: DISCONTINUED | OUTPATIENT
Start: 2017-12-13 | End: 2017-12-14 | Stop reason: HOSPADM

## 2017-12-13 RX ORDER — SODIUM CHLORIDE 9 MG/ML
INJECTION, SOLUTION INTRAVENOUS CONTINUOUS
Status: DISCONTINUED | OUTPATIENT
Start: 2017-12-13 | End: 2017-12-13

## 2017-12-13 RX ORDER — VANCOMYCIN HYDROCHLORIDE 1 G/200ML
1000 INJECTION, SOLUTION INTRAVENOUS ONCE
Status: DISCONTINUED | OUTPATIENT
Start: 2017-12-13 | End: 2017-12-14 | Stop reason: HOSPADM

## 2017-12-13 RX ORDER — SODIUM CHLORIDE 0.9 % (FLUSH) 0.9 %
10 SYRINGE (ML) INJECTION EVERY 12 HOURS SCHEDULED
Status: DISCONTINUED | OUTPATIENT
Start: 2017-12-13 | End: 2017-12-14 | Stop reason: HOSPADM

## 2017-12-13 RX ADMIN — OXYCODONE HYDROCHLORIDE AND ACETAMINOPHEN 2 TABLET: 5; 325 TABLET ORAL at 15:05

## 2017-12-13 RX ADMIN — SIMVASTATIN 20 MG: 20 TABLET, FILM COATED ORAL at 22:24

## 2017-12-13 RX ADMIN — DOCUSATE SODIUM 100 MG: 100 CAPSULE ORAL at 22:24

## 2017-12-13 RX ADMIN — SOTALOL HYDROCHLORIDE 40 MG: 80 TABLET ORAL at 23:37

## 2017-12-13 RX ADMIN — OXYCODONE HYDROCHLORIDE AND ACETAMINOPHEN 1 TABLET: 5; 325 TABLET ORAL at 22:24

## 2017-12-13 RX ADMIN — SODIUM CHLORIDE: 9 INJECTION, SOLUTION INTRAVENOUS at 08:20

## 2017-12-13 RX ADMIN — WARFARIN SODIUM 5 MG: 5 TABLET ORAL at 23:37

## 2017-12-13 RX ADMIN — Medication 10 ML: at 22:25

## 2017-12-13 RX ADMIN — FERROUS SULFATE TAB EC 325 MG (65 MG FE EQUIVALENT) 325 MG: 325 (65 FE) TABLET DELAYED RESPONSE at 22:24

## 2017-12-13 ASSESSMENT — PAIN SCALES - GENERAL
PAINLEVEL_OUTOF10: 5
PAINLEVEL_OUTOF10: 3

## 2017-12-13 NOTE — BRIEF OP NOTE
Brief Postoperative Note  ______________________________________________________________    Patient: Adina Marcos  YOB: 1960  MRN: [id number]  Date of Procedure:     Pre-Op Diagnosis:    1)  Atrial lead dislodgment  2)  Pacemaker in-situ  3)  Sick sinus syndrome     Post-Op Diagnosis: Same                  PROCEDURE:  1)  Repositioning of dislodged atrial lead w/ fluoroscopy  2)  Intraoperative lead testing         Anesthesia:  IV Versed and Fentanyl    Staff:  Dr. Tom Villarreal     Estimated Blood Loss:  Less than 10 ml    Complications: None    Specimens:   None    Implants:  None      Drains:  None    Findings: The atrial lead was fully dislodged at the level of the tricuspid annulus and was easily mobilized with an intact active fixation mechanism. It was repositioned to the anterolateral RA and showed good pacing and sensing thresholds after active fixation. The lead was stable on fluoroscopic view with deep inspiration and coughing.     Kari Anaya MD  Date: 12/13/2017  Time: 12:10 PM

## 2017-12-13 NOTE — H&P
[address] [address]                               HISTORY AND PHYSICAL    PATIENT NAME: Everette Putnam                   :        1960  MED REC NO:   [id number]                             ROOM:  ACCOUNT NO:   [de-identified]                           ADMIT DATE: 2017  PROVIDER:     Rachelle Bernstein    ADMISSION DIAGNOSIS:  Atrial pacemaker lead dislodgement. HISTORY:  The patient is a 40-year-old gentleman, now 6 weeks after [facility] MVR on 10/23/2017 with left atrial MAZE and left atrial appendage  closure procedures. The patient subsequently developed worsening sinus  node function requiring pacemaker implantation which was performed on  10/27/2017 using a [employer] Advisa DR MRI pacemaker along with Medtronic  4076 atrial and 5076 ventricular leads. The patient made a good recovery from surgery; however, prior to discharge,  he was found to have a atrial lead dislodgement. He was otherwise doing  well in sinus rhythm most of the time with rates in the 70s and 80s and  only occasional periods of sinus bradycardia and pauses. As such, his  pacemaker was reprogrammed to VVI mode. The patient now comes for  repositioning of his atrial lead. Recent outpatient chest x-ray has shown  the atrial lead dislodged at the level of the tricuspid annulus with the  right ventricular lead appearing very stable at the right ventricular apex  with excellent pacing and sensing thresholds. PAST MEDICAL HISTORY:  Significant for paroxysmal atrial fibrillation, on  sotalol; sinus node dysfunction with permanent pacemaker implanted,  10/27/2017; history of mitral regurgitation with mitral valve repair in   with 29 mm bioprosthesis and PFO closure with surgical MAZE procedure.   The patient subsequently developed worsening mitral regurgitation requiring  redo surgery on 10/23/2017 with Dr. Yogesh Spann using a [employer] MVR  along with left atrial MAZE procedure and left atrial appendage closure.   _____ history of PVCs with previous ablation attempts at [facility]  many years ago, a history of hypertension, history of vasovagal syndrome,  and positive tilt table testing. No significant CAD on recent cardiac  catheterization earlier this year. SOCIAL HISTORY:  The patient does not smoke and has rare beer or cocktail. He is  and lives in the [facility]. FAMILY HISTORY:  Significant for diabetes in his father. ALLERGIES:  He has no drug allergies. REVIEW OF SYSTEMS:  As per HPI, otherwise 12 out of 12 systems reviewed and  are negative. CURRENT MEDICATIONS:  Include aspirin 81 mg by mouth twice daily; Celexa 5  mg by mouth daily; docusate 100 mg p.o. b.i.d.; ferrous sulfate 325 mg  three times daily by mouth; Lasix 40 mg p.o. b.i.d.; Percocet 5/325 mg p.o.  p.r.n. for pain; Zocor 20 mg p.o. q.p.m.; sotalol 40 mg p.o. b.i.d.;  omega-3 capsules twice daily; warfarin 5 mg daily; and vitamin C daily. PHYSICAL EXAMINATION:  GENERAL:  The patient is comfortable, in no distress, alert and oriented. VITAL SIGNS:  The weight is 91.6 kg with a BSA of 2.11 meter squared and  BMI of 29.8 kg per meter squared. The pulse is 78, blood pressure 134/96  mmHg. Temperature is 97.9 degrees Fahrenheit orally, respiratory rate is  16 with O2 saturation of 98% on 2 liters. HEENT:  Exam shows no trauma about the head. Pupils are equal, round, and  reactive to light and accommodation. Extraocular eye movements are intact. Sclerae anicteric with showing no swelling or discoloration. Oropharynx is  clear. NECK:  The neck is supple. No thyromegaly or lymphadenopathy. No JVD. CHEST:  The chest is clear to auscultation and percussion. CARDIOVASCULAR:  Cardiac exam shows PMI discrete in the fifth intercostal  space, left midclavicular line. S1 of normal intensity, S2 is single.   The  S1 shows a crisp prosthetic click.  There is no murmur, rub, or gallop. ABDOMEN:  Scaphoid, benign. No hepatosplenomegaly, masses, or bruits. Bowel sounds are present. EXTREMITIES:  Showing no cyanosis, clubbing, or edema. Distal pulses 2+  and equal.  Carotid and femoral pulses 2+ and equal.  Normal pulse  contours. No bruits. NEUROLOGIC:  He is alert and oriented x3. Cranial nerves II-XII intact. Motor and sensory exams grossly intact. Reflexes 2+. Toes are downgoing. Gait is normal.  INTEGUMENT:  Shows no ecchymosis, trauma, or rash. Integument shows a  well-healed pacemaker site in the left deltopectoral groove with no  surrounding erythema, swelling, or tenderness. The midline sternotomy site  is well-healed. MUSCULOSKELETAL:  Shows no joint deformity, full range of motion. 12-lead EKG shows sinus rhythm at a rate of 78 beats per minute with  nonspecific ST-T wave abnormality. Review of recent outpatient chest x-ray shows a dislodged atrial lead at  the level of the tricuspid annulus with the right ventricular lead in  stable position at the right ventricular apex. There are no infiltrates,  no congestion or effusion. SUMMARY:  In summary, this is a middle-aged gentleman now 6 weeks after  open heart surgery and subsequent permanent pacemaker implantation has had  a dislodged atrial lead since just after implantation 2 months ago. As  such, we will proceed today with repositioning of his atrial lead under  fluoroscopic view. The patient understands the need for conscious sedation  as well as recovery period overnight to ensure stable lead position. Risks  of the procedure, including bleeding, infection, hematoma, or the rare  occurrence of cardiac tamponade were discussed with the patient and his  family who are agreeable to proceeding. Pepe Logan    D: 12/13/2017 12:30:10       T: 12/13/2017 12:36:58     [email or web address]  Job#: [id number]     Doc#: [id number]    CC:   Adriano Severino

## 2017-12-13 NOTE — PROGRESS NOTES
Remains resting quietly, no change in lt. Chest dressing, vitals remain stable, appetite good, family at bedside.

## 2017-12-14 ENCOUNTER — APPOINTMENT (OUTPATIENT)
Dept: GENERAL RADIOLOGY | Age: 57
DRG: 261 | End: 2017-12-14
Attending: INTERNAL MEDICINE
Payer: COMMERCIAL

## 2017-12-14 VITALS
RESPIRATION RATE: 18 BRPM | DIASTOLIC BLOOD PRESSURE: 86 MMHG | BODY MASS INDEX: 30.1 KG/M2 | HEART RATE: 78 BPM | SYSTOLIC BLOOD PRESSURE: 128 MMHG | TEMPERATURE: 98.1 F | WEIGHT: 203.2 LBS | OXYGEN SATURATION: 95 % | HEIGHT: 69 IN

## 2017-12-14 LAB
ANION GAP SERPL CALCULATED.3IONS-SCNC: 11 MMOL/L (ref 9–17)
BUN BLDV-MCNC: 28 MG/DL (ref 6–20)
BUN/CREAT BLD: ABNORMAL (ref 9–20)
CALCIUM SERPL-MCNC: 8.6 MG/DL (ref 8.6–10.4)
CHLORIDE BLD-SCNC: 103 MMOL/L (ref 98–107)
CO2: 27 MMOL/L (ref 20–31)
CREAT SERPL-MCNC: 0.9 MG/DL (ref 0.7–1.2)
EKG ATRIAL RATE: 74 BPM
EKG P AXIS: 39 DEGREES
EKG P-R INTERVAL: 226 MS
EKG Q-T INTERVAL: 464 MS
EKG QRS DURATION: 164 MS
EKG QTC CALCULATION (BAZETT): 515 MS
EKG R AXIS: -11 DEGREES
EKG T AXIS: 49 DEGREES
EKG VENTRICULAR RATE: 74 BPM
GFR AFRICAN AMERICAN: >60 ML/MIN
GFR NON-AFRICAN AMERICAN: >60 ML/MIN
GFR SERPL CREATININE-BSD FRML MDRD: ABNORMAL ML/MIN/{1.73_M2}
GFR SERPL CREATININE-BSD FRML MDRD: ABNORMAL ML/MIN/{1.73_M2}
GLUCOSE BLD-MCNC: 94 MG/DL (ref 70–99)
HCT VFR BLD CALC: 37.8 % (ref 40.7–50.3)
HEMOGLOBIN: 11.8 G/DL (ref 13–17)
INR BLD: 1.1
MCH RBC QN AUTO: 28.2 PG (ref 25.2–33.5)
MCHC RBC AUTO-ENTMCNC: 31.2 G/DL (ref 28.4–34.8)
MCV RBC AUTO: 90.4 FL (ref 82.6–102.9)
PDW BLD-RTO: 13.4 % (ref 11.8–14.4)
PLATELET # BLD: ABNORMAL K/UL (ref 138–453)
PLATELET, FLUORESCENCE: 134 K/UL (ref 138–453)
PLATELET, IMMATURE FRACTION: 6 % (ref 1.1–10.3)
PMV BLD AUTO: ABNORMAL FL (ref 8.1–13.5)
POTASSIUM SERPL-SCNC: 4.3 MMOL/L (ref 3.7–5.3)
PROTHROMBIN TIME: 11.6 SEC (ref 9.4–12.6)
RBC # BLD: 4.18 M/UL (ref 4.21–5.77)
SODIUM BLD-SCNC: 141 MMOL/L (ref 135–144)
WBC # BLD: 5.5 K/UL (ref 3.5–11.3)

## 2017-12-14 PROCEDURE — 6370000000 HC RX 637 (ALT 250 FOR IP): Performed by: INTERNAL MEDICINE

## 2017-12-14 PROCEDURE — 85027 COMPLETE CBC AUTOMATED: CPT

## 2017-12-14 PROCEDURE — 71010 XR CHEST PORTABLE: CPT

## 2017-12-14 PROCEDURE — G0378 HOSPITAL OBSERVATION PER HR: HCPCS

## 2017-12-14 PROCEDURE — 36415 COLL VENOUS BLD VENIPUNCTURE: CPT

## 2017-12-14 PROCEDURE — 6360000002 HC RX W HCPCS

## 2017-12-14 PROCEDURE — 2580000003 HC RX 258: Performed by: INTERNAL MEDICINE

## 2017-12-14 PROCEDURE — 80048 BASIC METABOLIC PNL TOTAL CA: CPT

## 2017-12-14 PROCEDURE — 93005 ELECTROCARDIOGRAM TRACING: CPT

## 2017-12-14 PROCEDURE — 85610 PROTHROMBIN TIME: CPT

## 2017-12-14 RX ORDER — ZOLPIDEM TARTRATE 5 MG/1
5 TABLET ORAL ONCE
Status: COMPLETED | OUTPATIENT
Start: 2017-12-14 | End: 2017-12-14

## 2017-12-14 RX ADMIN — MIDODRINE HYDROCHLORIDE 2.5 MG: 2.5 TABLET ORAL at 08:42

## 2017-12-14 RX ADMIN — FUROSEMIDE 40 MG: 40 TABLET ORAL at 08:41

## 2017-12-14 RX ADMIN — SOTALOL HYDROCHLORIDE 40 MG: 80 TABLET ORAL at 08:42

## 2017-12-14 RX ADMIN — OXYCODONE HYDROCHLORIDE AND ACETAMINOPHEN 1 TABLET: 5; 325 TABLET ORAL at 10:19

## 2017-12-14 RX ADMIN — DOCUSATE SODIUM 100 MG: 100 CAPSULE ORAL at 08:42

## 2017-12-14 RX ADMIN — OXYCODONE HYDROCHLORIDE AND ACETAMINOPHEN 1 TABLET: 5; 325 TABLET ORAL at 05:02

## 2017-12-14 RX ADMIN — ZOLPIDEM TARTRATE 5 MG: 5 TABLET ORAL at 00:37

## 2017-12-14 RX ADMIN — Medication 10 ML: at 08:48

## 2017-12-14 RX ADMIN — FERROUS SULFATE TAB EC 325 MG (65 MG FE EQUIVALENT) 325 MG: 325 (65 FE) TABLET DELAYED RESPONSE at 08:42

## 2017-12-14 RX ADMIN — CITALOPRAM 5 MG: 10 TABLET, FILM COATED ORAL at 08:42

## 2017-12-14 ASSESSMENT — PAIN SCALES - GENERAL
PAINLEVEL_OUTOF10: 5
PAINLEVEL_OUTOF10: 3

## 2017-12-14 NOTE — PROGRESS NOTES
Patient admitted to room 2001. Scooted over to bed from stretcher. C/o minimal pain to surgical site. Assessment, VS, Admission completed. Will continue to monitor.

## 2017-12-14 NOTE — PLAN OF CARE
Problem: Pain:  Goal: Control of acute pain  Control of acute pain  Outcome: Ongoing  Patient satisfied with pain management. Denies need for ice pack.  Sling applied

## 2017-12-14 NOTE — DISCHARGE SUMMARY
[facility]  Discharge Note                 Name:  Gabriel Hsu  YOB: 1960  Social Security Number:  [id number]  Medical Record Number:  [id number]    Date of Admission:  12/13/2017  Date of Discharge:  12/16/2017    Admitting physician: Keri Morales MD    Discharge Attending: Angie Haro CNP  Primary Care Physician: Foye Harada, MD  Consultants: Cardiology  Discharge to [address] [place] [address] LIST:  Patient Active Problem List   Diagnosis    Chronic diastolic CHF (congestive heart failure) (HCC)    Moderate mitral regurgitation    Paroxysmal atrial fibrillation (HCC)    Prolonged QT interval    Acute congestive heart failure ([address]    Pacemaker Implant - Dual 10/27/17-Dr. Tayo Rizo    H/O mitral valve replacement with mechanical valve    Postoperative complete heart block (HCC)    Acute blood loss as cause of postoperative anemia    Atrial pacemaker lead displacement    S/P placement of cardiac pacemaker         Procedures: Pacemaker [facility] :           The patient was admitted for: Lead dislodgement  Hospital Procedures if any: Lead Revision  Medications changes recommendation: See List  Follow Up Plan: 1 week for wound check an 1 month for visit      Discharge exam:   Vitals:    12/14/17 0015   BP: 128/86   Pulse: 78   Resp: 18   Temp: 98.1 °F (36.7 °C)   SpO2: 95%     Neuro: normal  Chest: Clear to ausculation. No wheezing. Cardiac: Regular rate. s1 and s2 auscultated. No murmur noted. Abdomen/groin: soft, non-tender, without masses or organomegaly  Lower extremity edema: none  Left chest s/p PPM site CDI. Dressing intact. No drainage. Mild hematoma without swelling. Left radial pulse palpable x3. Follow up with primary care provider 1 week  Follow up with cardiology 4 weeks  Follow up with other consultant physicians at their directions.     Discharge Medications:   Gearldean Carmenza   Home Medication 9:19 AM  [facility]      [phone]

## 2017-12-16 PROBLEM — I50.32 CHRONIC DIASTOLIC CHF (CONGESTIVE HEART FAILURE) (HCC): Chronic | Status: ACTIVE | Noted: 2017-10-17

## 2017-12-18 ENCOUNTER — HOSPITAL ENCOUNTER (OUTPATIENT)
Dept: PHARMACY | Age: 57
Setting detail: THERAPIES SERIES
Discharge: HOME | End: 2017-12-18
Payer: COMMERCIAL

## 2017-12-18 DIAGNOSIS — Z95.2 H/O MITRAL VALVE REPLACEMENT WITH MECHANICAL VALVE: ICD-10-CM

## 2017-12-18 LAB
INR BLD: 2.2
PROTIME: 26.2 SECONDS

## 2017-12-18 PROCEDURE — 99211 OFF/OP EST MAY X REQ PHY/QHP: CPT

## 2017-12-18 PROCEDURE — 85610 PROTHROMBIN TIME: CPT

## 2017-12-18 NOTE — PROGRESS NOTES
First visit to ACS Office. Patient has been previously managed by his physician office and through home care following cardiac procedure. Education provided on indication and mechanism of warfarin, compliance, appropriate follow-up & monitoring, dietary and medication interactions, potential thromboembolic & bleeding complications, when to seek medical care, and office policy. Patient acknowledges working in consult agreement with pharmacist as referred by his/her physician. Patient states he was alternating 5mg and 2.5mg until just recently he was off therapy 3-4 days. He had pacemaker procedure 12/13 and was restarted on warfarin the next day. No bleeding or thromboembolic side effects noted. No significant med or dietary changes. No significant recent illness or disease state changes. PT/INR done in office per protocol. INR is 2.2 which is just below goal (2.5-3.5) as INR is likely still climbing from restart. Warfarin regimen will be continued with 2.5mg MWF and 5mg AOD. Will retest in 9 days. Patient understands dosing directions and information discussed. Dosing schedule and follow up appointment given to patient. Progress note routed to referring physicians office.

## 2017-12-27 ENCOUNTER — HOSPITAL ENCOUNTER (OUTPATIENT)
Dept: PHARMACY | Age: 57
Setting detail: THERAPIES SERIES
Discharge: HOME | End: 2017-12-27
Payer: COMMERCIAL

## 2017-12-27 DIAGNOSIS — Z95.2 H/O MITRAL VALVE REPLACEMENT WITH MECHANICAL VALVE: ICD-10-CM

## 2017-12-27 LAB
INR BLD: 2.3
PROTIME: 27.3 SECONDS

## 2017-12-27 PROCEDURE — 85610 PROTHROMBIN TIME: CPT

## 2017-12-27 PROCEDURE — 99211 OFF/OP EST MAY X REQ PHY/QHP: CPT

## 2018-01-02 NOTE — PROCEDURES
[address] [facility]                              CARDIAC CATHETERIZATION    PATIENT NAME: Sis Paredes                   :        1960  MED REC NO:   [id number]                             ROOM:  ACCOUNT NO:   [de-identified]                           ADMIT DATE: 2017  PROVIDER:     Gonsalo Hazel    DATE OF PROCEDURE:  2017    PROCEDURES:  1. Repositioning of atrial pacemaker lead. 2.  Intraoperative lead testing. 3.  Conscious sedation. PREOPERATIVE DIAGNOSES:  1. Atrial lead dislodgement. 2.  Sick sinus syndrome with pacemaker in situ. POSTOPERATIVE DIAGNOSES:  1. Atrial lead dislodgement. 2.  Sick sinus syndrome with pacemaker in situ. PULSE GENERATOR:  Medtronic Advisa DR AMOR, model [id number]. IMPLANTS:  1. The atrial lead is Medtronic [id number], serial [id number], implanted  10/27/2017.  2.  The right ventricular lead is Medtronic [id number], serial #[id number],  implanted 10/27/2017. ACCESS:  Left subclavian vein. COMPLICATIONS:  None. ESTIMATED BLOOD LOSS:  Minimal (10 mL). DESCRIPTION OF PROCEDURE:  After informed consent was obtained, the patient  was brought to the electrophysiology laboratory in the fasting, on sedated  state. He was placed on the fluoroscopy table, prepped and draped in  sterile fashion for left pectoral device lead revision. Conscious sedation  was then administered in the form of IV Versed and fentanyl and he remained  stable during the case with O2 saturations never fallen below 90%. A total of 40 mL of 1% aqueous lidocaine with epinephrine was used to  infiltrate subcutaneous tissues surrounding the pacemaker site in the left  infraclavicular fossa.   Using a standard #15 scalpel blade, a 2-inch  incision was carefully made immediately over the old scar line of the  pacemaker site running parallel to the left clavicle and approximately 70 with  pacing output set at 3.5 volts at 0.4 milliseconds.         Katie Box    D: 01/02/2018 7:15:37       T: 01/02/2018 7:18:06     [email or web address]  Job#: [id number]     Doc#: [id number]    CC:  Sabino Valdovinos

## 2018-01-09 ENCOUNTER — HOSPITAL ENCOUNTER (OUTPATIENT)
Dept: PHARMACY | Age: 58
Setting detail: THERAPIES SERIES
Discharge: HOME | End: 2018-01-09
Payer: COMMERCIAL

## 2018-01-09 DIAGNOSIS — Z95.2 H/O MITRAL VALVE REPLACEMENT WITH MECHANICAL VALVE: ICD-10-CM

## 2018-01-09 LAB
INR BLD: 2.6
PROTIME: 31.8 SECONDS

## 2018-01-09 PROCEDURE — 99211 OFF/OP EST MAY X REQ PHY/QHP: CPT

## 2018-01-09 PROCEDURE — 85610 PROTHROMBIN TIME: CPT

## 2018-01-09 NOTE — PROGRESS NOTES
Patient states compliant all of the time with regimen. No bleeding or thromboembolic side effects noted. No significant med or dietary changes. No significant recent illness or disease state changes. PT/INR done in office per protocol. INR is 2.6 which is therapeutic. Warfarin regimen will be continued at current dose of 2.5mg MWF, 5mg all other days. Will retest in 3 weeks. Patient understands dosing directions and information discussed. Dosing schedule and follow up appointment given to patient. Progress note routed to referring physicians office. Patient acknowledges working in consult agreement with pharmacist as referred by his/her physician.

## 2018-01-24 ENCOUNTER — OFFICE VISIT (OUTPATIENT)
Dept: CARDIOTHORACIC SURGERY | Age: 58
End: 2018-01-24
Payer: COMMERCIAL

## 2018-01-24 VITALS
RESPIRATION RATE: 18 BRPM | BODY MASS INDEX: 31.7 KG/M2 | SYSTOLIC BLOOD PRESSURE: 104 MMHG | WEIGHT: 214 LBS | HEIGHT: 69 IN | DIASTOLIC BLOOD PRESSURE: 75 MMHG | TEMPERATURE: 97 F | OXYGEN SATURATION: 97 % | HEART RATE: 87 BPM

## 2018-01-24 DIAGNOSIS — Z95.2 S/P MVR (MITRAL VALVE REPLACEMENT): ICD-10-CM

## 2018-01-24 DIAGNOSIS — Z09 FOLLOW UP: Primary | ICD-10-CM

## 2018-01-24 PROCEDURE — G8417 CALC BMI ABV UP PARAM F/U: HCPCS | Performed by: THORACIC SURGERY (CARDIOTHORACIC VASCULAR SURGERY)

## 2018-01-24 PROCEDURE — 1036F TOBACCO NON-USER: CPT | Performed by: THORACIC SURGERY (CARDIOTHORACIC VASCULAR SURGERY)

## 2018-01-24 PROCEDURE — G8427 DOCREV CUR MEDS BY ELIG CLIN: HCPCS | Performed by: THORACIC SURGERY (CARDIOTHORACIC VASCULAR SURGERY)

## 2018-01-24 PROCEDURE — G8484 FLU IMMUNIZE NO ADMIN: HCPCS | Performed by: THORACIC SURGERY (CARDIOTHORACIC VASCULAR SURGERY)

## 2018-01-24 PROCEDURE — 3017F COLORECTAL CA SCREEN DOC REV: CPT | Performed by: THORACIC SURGERY (CARDIOTHORACIC VASCULAR SURGERY)

## 2018-01-24 PROCEDURE — 99213 OFFICE O/P EST LOW 20 MIN: CPT | Performed by: THORACIC SURGERY (CARDIOTHORACIC VASCULAR SURGERY)

## 2018-01-24 RX ORDER — TESTOSTERONE 12.5 MG/1.25G
30 GEL TOPICAL DAILY
COMMUNITY
End: 2018-01-30

## 2018-01-24 RX ORDER — TESTOSTERONE GEL, 1% 10 MG/G
GEL TRANSDERMAL DAILY
COMMUNITY
End: 2018-01-30

## 2018-01-30 ENCOUNTER — HOSPITAL ENCOUNTER (OUTPATIENT)
Dept: PHARMACY | Age: 58
Setting detail: THERAPIES SERIES
Discharge: HOME | End: 2018-01-30
Payer: COMMERCIAL

## 2018-01-30 DIAGNOSIS — Z95.2 H/O MITRAL VALVE REPLACEMENT WITH MECHANICAL VALVE: ICD-10-CM

## 2018-01-30 LAB
INR BLD: 2.2
PROTIME: 26 SECONDS

## 2018-01-30 PROCEDURE — 99211 OFF/OP EST MAY X REQ PHY/QHP: CPT

## 2018-01-30 PROCEDURE — 85610 PROTHROMBIN TIME: CPT

## 2018-01-30 RX ORDER — TESTOSTERONE 30 MG/1.5ML
60 SOLUTION TOPICAL DAILY
COMMUNITY

## 2018-01-30 RX ORDER — SOTALOL HYDROCHLORIDE 80 MG/1
40 TABLET ORAL NIGHTLY
COMMUNITY

## 2018-01-30 RX ORDER — SIMVASTATIN 20 MG
10 TABLET ORAL NIGHTLY
COMMUNITY
End: 2019-07-09 | Stop reason: ALTCHOICE

## 2018-01-30 RX ORDER — GUAIFENESIN 1200 MG/1
1200 TABLET, EXTENDED RELEASE ORAL 2 TIMES DAILY PRN
COMMUNITY
End: 2019-07-09 | Stop reason: ALTCHOICE

## 2018-01-30 RX ORDER — FUROSEMIDE 40 MG/1
40 TABLET ORAL DAILY PRN
COMMUNITY

## 2018-01-30 RX ORDER — WARFARIN SODIUM 5 MG/1
TABLET ORAL SEE ADMIN INSTRUCTIONS
COMMUNITY
End: 2019-03-05 | Stop reason: SDUPTHER

## 2018-01-30 RX ORDER — MIDODRINE HYDROCHLORIDE 2.5 MG/1
2.5 TABLET ORAL 3 TIMES DAILY PRN
COMMUNITY

## 2018-01-30 RX ORDER — CITALOPRAM 10 MG/1
5 TABLET ORAL DAILY
COMMUNITY

## 2018-01-30 RX ORDER — M-VIT,TX,IRON,MINS/CALC/FOLIC 27MG-0.4MG
1 TABLET ORAL DAILY
COMMUNITY

## 2018-01-30 RX ORDER — SOTALOL HYDROCHLORIDE 80 MG/1
80 TABLET ORAL EVERY MORNING
COMMUNITY

## 2018-01-30 NOTE — PROGRESS NOTES
Patient states compliant all of the time with regimen. No bleeding or thromboembolic side effects noted. No significant med or dietary changes. No significant recent illness or disease state changes. PT/INR done in office per protocol. INR is 2.2 which is subtherapeutic (goal 2.5-3.5). Warfarin regimen will be increased to 10mg x 1 then increased from 2.5mg MWF and 5mg all other days to 2.5mg on Mon and Fri and 5mg all other days since INR has been running low at last few INR visits. Will retest in 3 weeks. Patient understands dosing directions and information discussed. Dosing schedule and follow up appointment given to patient. Progress note routed to referring physicians office. Patient acknowledges working in consult agreement with pharmacist as referred by his/her physician.

## 2018-02-13 ENCOUNTER — HOSPITAL ENCOUNTER (OUTPATIENT)
Dept: PHARMACY | Age: 58
Setting detail: THERAPIES SERIES
Discharge: HOME | End: 2018-02-13
Payer: COMMERCIAL

## 2018-02-13 DIAGNOSIS — Z95.2 H/O MITRAL VALVE REPLACEMENT WITH MECHANICAL VALVE: ICD-10-CM

## 2018-02-13 LAB
INR BLD: 2
PROTIME: 24.1 SECONDS

## 2018-02-13 PROCEDURE — 99211 OFF/OP EST MAY X REQ PHY/QHP: CPT

## 2018-02-13 PROCEDURE — 85610 PROTHROMBIN TIME: CPT

## 2018-02-13 NOTE — PROGRESS NOTES
Patient states compliant all of the time with regimen. No bleeding or thromboembolic side effects noted. No significant med changes. No significant recent illness or disease state changes. Since INR is dropping, we had an extensive conversation about reasons his INR could be subtherapeutic. Patient used to drink a kid-sized cup of cranberry juice nightly until about 2-3 weeks ago when he stopped as he thought it would lower his INR (when in fact it can raise INR). The lack of cranberry juice in his diet can cause a decrease in INR. Also, patient states he has been more active at work lately and this can speed up metabolism of coumadin leading to a low INR. PT/INR done in office per protocol. INR is 2.0 which is subtherapeutic (goal 2.5-3.5). Warfarin regimen will be increased to 10mg x 1 then dose will be increased to 5mg daily (was 2.5mg Mon/Fri and 5mg all other days). Will retest in 6 days. Patient understands dosing directions and information discussed. Dosing schedule and follow up appointment given to patient. Progress note routed to referring physicians office. Patient acknowledges working in consult agreement with pharmacist as referred by his/her physician.

## 2018-02-19 ENCOUNTER — HOSPITAL ENCOUNTER (OUTPATIENT)
Dept: PHARMACY | Age: 58
Setting detail: THERAPIES SERIES
Discharge: HOME | End: 2018-02-19
Payer: COMMERCIAL

## 2018-02-19 DIAGNOSIS — Z95.2 H/O MITRAL VALVE REPLACEMENT WITH MECHANICAL VALVE: ICD-10-CM

## 2018-02-19 LAB
INR BLD: 3.5
PROTIME: 41.5 SECONDS

## 2018-02-19 PROCEDURE — 85610 PROTHROMBIN TIME: CPT

## 2018-02-19 PROCEDURE — 99211 OFF/OP EST MAY X REQ PHY/QHP: CPT

## 2018-02-19 NOTE — PROGRESS NOTES
Patient states compliant all of the time with regimen. No bleeding or thromboembolic side effects noted. No significant med changes. No significant recent illness or disease state changes. Patient does note an increase in activity since starting therapy which could be why his dose requirement has increased. Due to previously low INR patient states he has avoided foods with vitamin K and has not resumed cranberry juice - he plans to resume normal habits now until our next test.    PT/INR done in office per protocol. INR is 3.5 which is therapeutic. Warfarin regimen will be continued with 5mg daily. Will retest in 2 weeks. Patient understands dosing directions and information discussed. Dosing schedule and follow up appointment given to patient. Progress note routed to referring physicians office. Patient acknowledges working in consult agreement with pharmacist as referred by his/her physician.

## 2018-02-20 ENCOUNTER — APPOINTMENT (OUTPATIENT)
Dept: PHARMACY | Age: 58
End: 2018-02-20
Payer: COMMERCIAL

## 2018-03-06 ENCOUNTER — HOSPITAL ENCOUNTER (OUTPATIENT)
Dept: PHARMACY | Age: 58
Setting detail: THERAPIES SERIES
Discharge: HOME | End: 2018-03-06
Payer: COMMERCIAL

## 2018-03-06 DIAGNOSIS — Z95.2 H/O MITRAL VALVE REPLACEMENT WITH MECHANICAL VALVE: ICD-10-CM

## 2018-03-06 LAB
INR BLD: 2.9
PROTIME: 34.4 SECONDS

## 2018-03-06 PROCEDURE — 85610 PROTHROMBIN TIME: CPT

## 2018-03-06 PROCEDURE — 99211 OFF/OP EST MAY X REQ PHY/QHP: CPT

## 2018-03-27 ENCOUNTER — HOSPITAL ENCOUNTER (OUTPATIENT)
Dept: PHARMACY | Age: 58
Setting detail: THERAPIES SERIES
Discharge: HOME | End: 2018-03-27
Payer: COMMERCIAL

## 2018-03-27 DIAGNOSIS — Z95.2 H/O MITRAL VALVE REPLACEMENT WITH MECHANICAL VALVE: ICD-10-CM

## 2018-03-27 LAB
INR BLD: 1.7
PROTIME: 20.1 SECONDS

## 2018-03-27 PROCEDURE — 99211 OFF/OP EST MAY X REQ PHY/QHP: CPT

## 2018-03-27 PROCEDURE — 85610 PROTHROMBIN TIME: CPT

## 2018-04-17 ENCOUNTER — HOSPITAL ENCOUNTER (OUTPATIENT)
Dept: PHARMACY | Age: 58
Setting detail: THERAPIES SERIES
Discharge: HOME | End: 2018-04-17
Payer: COMMERCIAL

## 2018-04-17 DIAGNOSIS — Z95.2 H/O MITRAL VALVE REPLACEMENT WITH MECHANICAL VALVE: ICD-10-CM

## 2018-04-17 LAB
INR BLD: 2.6
PROTIME: 30.7 SECONDS

## 2018-04-17 PROCEDURE — 85610 PROTHROMBIN TIME: CPT

## 2018-04-17 PROCEDURE — 99211 OFF/OP EST MAY X REQ PHY/QHP: CPT

## 2018-04-24 ENCOUNTER — TELEPHONE (OUTPATIENT)
Dept: PHARMACY | Age: 58
End: 2018-04-24

## 2018-05-03 ENCOUNTER — TELEPHONE (OUTPATIENT)
Dept: PHARMACY | Age: 58
End: 2018-05-03

## 2018-05-15 ENCOUNTER — HOSPITAL ENCOUNTER (OUTPATIENT)
Dept: PHARMACY | Age: 58
Setting detail: THERAPIES SERIES
Discharge: HOME | End: 2018-05-15
Payer: COMMERCIAL

## 2018-05-15 DIAGNOSIS — Z95.2 H/O MITRAL VALVE REPLACEMENT WITH MECHANICAL VALVE: ICD-10-CM

## 2018-05-15 LAB
INR BLD: 2.3
PROTIME: 27.1 SECONDS

## 2018-05-15 PROCEDURE — 99211 OFF/OP EST MAY X REQ PHY/QHP: CPT

## 2018-05-15 PROCEDURE — 85610 PROTHROMBIN TIME: CPT

## 2018-06-12 ENCOUNTER — HOSPITAL ENCOUNTER (OUTPATIENT)
Dept: PHARMACY | Age: 58
Setting detail: THERAPIES SERIES
Discharge: HOME | End: 2018-06-12
Payer: COMMERCIAL

## 2018-06-12 DIAGNOSIS — Z95.2 H/O MITRAL VALVE REPLACEMENT WITH MECHANICAL VALVE: ICD-10-CM

## 2018-06-12 LAB
INR BLD: 2.5
PROTIME: 30.3 SECONDS

## 2018-06-12 PROCEDURE — 85610 PROTHROMBIN TIME: CPT

## 2018-06-12 PROCEDURE — 99211 OFF/OP EST MAY X REQ PHY/QHP: CPT

## 2018-07-17 ENCOUNTER — HOSPITAL ENCOUNTER (OUTPATIENT)
Dept: PHARMACY | Age: 58
Setting detail: THERAPIES SERIES
Discharge: HOME | End: 2018-07-17
Payer: COMMERCIAL

## 2018-07-17 ENCOUNTER — APPOINTMENT (OUTPATIENT)
Dept: PHARMACY | Age: 58
End: 2018-07-17
Payer: COMMERCIAL

## 2018-07-17 DIAGNOSIS — Z95.2 H/O MITRAL VALVE REPLACEMENT WITH MECHANICAL VALVE: ICD-10-CM

## 2018-07-17 LAB
INR BLD: 3.4
PROTIME: 40.4 SECONDS

## 2018-07-17 PROCEDURE — 99211 OFF/OP EST MAY X REQ PHY/QHP: CPT

## 2018-07-17 PROCEDURE — 85610 PROTHROMBIN TIME: CPT

## 2018-07-17 NOTE — PROGRESS NOTES
Patient states compliant all of the time with regimen. No bleeding or thromboembolic side effects noted. No significant med or dietary changes. No significant recent illness or disease state changes. PT/INR done in office per protocol. INR is 3.4 which is therapeutic (goal 2.5-3.5). Warfarin regimen will be continued at current dose of 5mg daily. Will retest in 5 weeks. Patient understands dosing directions and information discussed. Dosing schedule and follow up appointment given to patient. Progress note routed to referring physicians office. Patient acknowledges working in consult agreement with pharmacist as referred by his/her physician.

## 2018-08-21 ENCOUNTER — HOSPITAL ENCOUNTER (OUTPATIENT)
Dept: PHARMACY | Age: 58
Setting detail: THERAPIES SERIES
Discharge: HOME | End: 2018-08-21
Payer: COMMERCIAL

## 2018-08-21 DIAGNOSIS — Z95.2 H/O MITRAL VALVE REPLACEMENT WITH MECHANICAL VALVE: ICD-10-CM

## 2018-08-21 LAB
INR BLD: 4
PROTIME: 47.4 SECONDS

## 2018-08-21 PROCEDURE — 85610 PROTHROMBIN TIME: CPT

## 2018-08-21 PROCEDURE — 99212 OFFICE O/P EST SF 10 MIN: CPT

## 2018-09-10 ENCOUNTER — HOSPITAL ENCOUNTER (OUTPATIENT)
Dept: PHARMACY | Age: 58
Setting detail: THERAPIES SERIES
Discharge: HOME | End: 2018-09-10
Payer: COMMERCIAL

## 2018-09-10 DIAGNOSIS — Z95.2 H/O MITRAL VALVE REPLACEMENT WITH MECHANICAL VALVE: ICD-10-CM

## 2018-09-10 LAB
INR BLD: 5.1
PROTIME: 60.8 SECONDS

## 2018-09-10 PROCEDURE — 99212 OFFICE O/P EST SF 10 MIN: CPT

## 2018-09-10 PROCEDURE — 85610 PROTHROMBIN TIME: CPT

## 2018-09-11 ENCOUNTER — APPOINTMENT (OUTPATIENT)
Dept: PHARMACY | Age: 58
End: 2018-09-11
Payer: COMMERCIAL

## 2018-09-18 ENCOUNTER — HOSPITAL ENCOUNTER (OUTPATIENT)
Dept: PHARMACY | Age: 58
Setting detail: THERAPIES SERIES
Discharge: HOME | End: 2018-09-18
Payer: COMMERCIAL

## 2018-09-18 DIAGNOSIS — Z95.2 H/O MITRAL VALVE REPLACEMENT WITH MECHANICAL VALVE: ICD-10-CM

## 2018-09-18 LAB
INR BLD: 2.5
PROTIME: 29.8 SECONDS

## 2018-09-18 PROCEDURE — 85610 PROTHROMBIN TIME: CPT

## 2018-09-18 PROCEDURE — 99211 OFF/OP EST MAY X REQ PHY/QHP: CPT

## 2018-09-26 ENCOUNTER — HOSPITAL ENCOUNTER (OUTPATIENT)
Dept: PHARMACY | Age: 58
Setting detail: THERAPIES SERIES
Discharge: HOME | End: 2018-09-26
Payer: COMMERCIAL

## 2018-09-26 DIAGNOSIS — Z95.2 H/O MITRAL VALVE REPLACEMENT WITH MECHANICAL VALVE: ICD-10-CM

## 2018-09-26 LAB
INR BLD: 3.2
PROTIME: 37.9 SECONDS

## 2018-09-26 PROCEDURE — 85610 PROTHROMBIN TIME: CPT

## 2018-09-26 PROCEDURE — 99211 OFF/OP EST MAY X REQ PHY/QHP: CPT

## 2018-09-26 RX ORDER — CHLORAL HYDRATE 500 MG
1000 CAPSULE ORAL DAILY
COMMUNITY

## 2018-10-09 ENCOUNTER — HOSPITAL ENCOUNTER (OUTPATIENT)
Dept: PHARMACY | Age: 58
Setting detail: THERAPIES SERIES
Discharge: HOME | End: 2018-10-09
Payer: COMMERCIAL

## 2018-10-09 DIAGNOSIS — Z95.2 H/O MITRAL VALVE REPLACEMENT WITH MECHANICAL VALVE: ICD-10-CM

## 2018-10-09 LAB
INR BLD: 2.5
PROTIME: 29.6 SECONDS

## 2018-10-09 PROCEDURE — 99211 OFF/OP EST MAY X REQ PHY/QHP: CPT

## 2018-10-09 PROCEDURE — 85610 PROTHROMBIN TIME: CPT

## 2018-10-09 NOTE — PROGRESS NOTES
Patient states compliant all of the time with regimen. No bleeding or thromboembolic side effects noted. No significant med or dietary changes. No significant recent illness or disease state changes. PT/INR done in office per protocol. INR is 2.5 which is therapeutic. Warfarin regimen will be continued at current dose of 2.5mg T/R and 5mg AOD. Will retest in 4 weeks. Patient understands dosing directions and information discussed. Dosing schedule and follow up appointment given to patient. Progress note routed to referring physicians office. Patient acknowledges working in consult agreement with pharmacist as referred by his/her physician.

## 2018-11-05 ENCOUNTER — TELEPHONE (OUTPATIENT)
Dept: PHARMACY | Age: 58
End: 2018-11-05

## 2018-11-05 DIAGNOSIS — Z95.2 H/O MITRAL VALVE REPLACEMENT WITH MECHANICAL VALVE: ICD-10-CM

## 2018-11-06 ENCOUNTER — APPOINTMENT (OUTPATIENT)
Dept: PHARMACY | Age: 58
End: 2018-11-06
Payer: COMMERCIAL

## 2018-11-07 ENCOUNTER — APPOINTMENT (OUTPATIENT)
Dept: PHARMACY | Age: 58
End: 2018-11-07
Payer: COMMERCIAL

## 2018-11-12 ENCOUNTER — HOSPITAL ENCOUNTER (OUTPATIENT)
Dept: PHARMACY | Age: 58
Setting detail: THERAPIES SERIES
Discharge: HOME | End: 2018-11-12
Payer: COMMERCIAL

## 2018-11-12 DIAGNOSIS — Z95.2 H/O MITRAL VALVE REPLACEMENT WITH MECHANICAL VALVE: ICD-10-CM

## 2018-11-12 LAB
INR BLD: 2.6
PROTIME: 31.7 SECONDS

## 2018-11-12 PROCEDURE — 99211 OFF/OP EST MAY X REQ PHY/QHP: CPT

## 2018-11-12 PROCEDURE — 85610 PROTHROMBIN TIME: CPT

## 2018-12-11 ENCOUNTER — HOSPITAL ENCOUNTER (OUTPATIENT)
Dept: PHARMACY | Age: 58
Setting detail: THERAPIES SERIES
Discharge: HOME | End: 2018-12-11
Payer: COMMERCIAL

## 2018-12-11 DIAGNOSIS — Z95.2 H/O MITRAL VALVE REPLACEMENT WITH MECHANICAL VALVE: ICD-10-CM

## 2018-12-11 LAB
INR BLD: 3.7
PROTIME: 44.6 SECONDS

## 2018-12-11 PROCEDURE — 85610 PROTHROMBIN TIME: CPT

## 2018-12-11 PROCEDURE — 99211 OFF/OP EST MAY X REQ PHY/QHP: CPT

## 2019-01-08 ENCOUNTER — HOSPITAL ENCOUNTER (OUTPATIENT)
Dept: PHARMACY | Age: 59
Setting detail: THERAPIES SERIES
Discharge: HOME | End: 2019-01-08
Payer: COMMERCIAL

## 2019-01-08 DIAGNOSIS — Z95.2 H/O MITRAL VALVE REPLACEMENT WITH MECHANICAL VALVE: ICD-10-CM

## 2019-01-08 LAB
INR BLD: 2.8
PROTIME: 33.7 SECONDS

## 2019-01-08 PROCEDURE — 99211 OFF/OP EST MAY X REQ PHY/QHP: CPT

## 2019-01-08 PROCEDURE — 85610 PROTHROMBIN TIME: CPT

## 2019-02-05 ENCOUNTER — HOSPITAL ENCOUNTER (OUTPATIENT)
Dept: PHARMACY | Age: 59
Setting detail: THERAPIES SERIES
Discharge: HOME | End: 2019-02-05
Payer: COMMERCIAL

## 2019-02-05 DIAGNOSIS — Z95.2 H/O MITRAL VALVE REPLACEMENT WITH MECHANICAL VALVE: ICD-10-CM

## 2019-02-05 LAB
INR BLD: 2
PROTIME: 24.1 SECONDS

## 2019-02-05 PROCEDURE — 85610 PROTHROMBIN TIME: CPT

## 2019-02-05 PROCEDURE — 99212 OFFICE O/P EST SF 10 MIN: CPT

## 2019-02-06 ENCOUNTER — TELEPHONE (OUTPATIENT)
Dept: PHARMACY | Age: 59
End: 2019-02-06

## 2019-02-06 DIAGNOSIS — Z95.2 H/O MITRAL VALVE REPLACEMENT WITH MECHANICAL VALVE: ICD-10-CM

## 2019-02-26 ENCOUNTER — HOSPITAL ENCOUNTER (OUTPATIENT)
Dept: PHARMACY | Age: 59
Setting detail: THERAPIES SERIES
Discharge: HOME | End: 2019-02-26
Payer: COMMERCIAL

## 2019-02-26 DIAGNOSIS — Z95.2 H/O MITRAL VALVE REPLACEMENT WITH MECHANICAL VALVE: ICD-10-CM

## 2019-02-26 LAB
INR BLD: 2.6
PROTIME: 31.7 SECONDS

## 2019-02-26 PROCEDURE — 85610 PROTHROMBIN TIME: CPT

## 2019-02-26 PROCEDURE — 99211 OFF/OP EST MAY X REQ PHY/QHP: CPT

## 2019-03-05 DIAGNOSIS — Z95.2 H/O MITRAL VALVE REPLACEMENT WITH MECHANICAL VALVE: ICD-10-CM

## 2019-03-05 RX ORDER — WARFARIN SODIUM 5 MG/1
2.5-5 TABLET ORAL DAILY
Qty: 90 TABLET | Refills: 1 | Status: SHIPPED | OUTPATIENT
Start: 2019-03-05 | End: 2019-09-10 | Stop reason: SDUPTHER

## 2019-03-26 ENCOUNTER — HOSPITAL ENCOUNTER (OUTPATIENT)
Dept: PHARMACY | Age: 59
Setting detail: THERAPIES SERIES
Discharge: HOME | End: 2019-03-26
Payer: COMMERCIAL

## 2019-03-26 DIAGNOSIS — Z95.2 H/O MITRAL VALVE REPLACEMENT WITH MECHANICAL VALVE: ICD-10-CM

## 2019-03-26 LAB
INR BLD: 3.1
PROTIME: 37.1 SECONDS

## 2019-03-26 PROCEDURE — 99211 OFF/OP EST MAY X REQ PHY/QHP: CPT

## 2019-03-26 PROCEDURE — 85610 PROTHROMBIN TIME: CPT

## 2019-04-23 ENCOUNTER — HOSPITAL ENCOUNTER (OUTPATIENT)
Dept: PHARMACY | Age: 59
Setting detail: THERAPIES SERIES
Discharge: HOME | End: 2019-04-23
Payer: COMMERCIAL

## 2019-04-23 DIAGNOSIS — Z95.2 H/O MITRAL VALVE REPLACEMENT WITH MECHANICAL VALVE: ICD-10-CM

## 2019-04-23 LAB
INR BLD: 2.6
PROTIME: 31.6 SECONDS

## 2019-04-23 PROCEDURE — 99211 OFF/OP EST MAY X REQ PHY/QHP: CPT

## 2019-04-23 PROCEDURE — 85610 PROTHROMBIN TIME: CPT

## 2019-04-23 NOTE — PROGRESS NOTES
Patient states compliant all of the time with regimen. No bleeding or thromboembolic side effects noted. No significant med or dietary changes. No significant recent illness or disease state changes. PT/INR done in office per protocol. INR is 2.6 which is therapeutic. Warfarin regimen will be continued at current dose 2.5mg Tues/Thurs, 5mg all other days. Will retest in 5 weeks. Patient understands dosing directions and information discussed. Dosing schedule and follow up appointment given to patient. Progress note routed to referring physicians office. Patient acknowledges working in consult agreement with pharmacist as referred by his/her physician.       [address], 4/23/2019 2:27 PM

## 2019-05-28 ENCOUNTER — HOSPITAL ENCOUNTER (OUTPATIENT)
Dept: PHARMACY | Age: 59
Setting detail: THERAPIES SERIES
Discharge: HOME | End: 2019-05-28
Payer: COMMERCIAL

## 2019-05-28 DIAGNOSIS — Z95.2 H/O MITRAL VALVE REPLACEMENT WITH MECHANICAL VALVE: ICD-10-CM

## 2019-05-28 LAB
INR BLD: 3
PROTIME: 35.7 SECONDS

## 2019-05-28 PROCEDURE — 99211 OFF/OP EST MAY X REQ PHY/QHP: CPT

## 2019-05-28 PROCEDURE — 85610 PROTHROMBIN TIME: CPT

## 2019-05-28 NOTE — PROGRESS NOTES
Patient states compliant all of the time with regimen. No bleeding or thromboembolic side effects noted. No significant med or dietary changes. No significant recent illness or disease state changes. PT/INR done in office per protocol. INR is 3.0 which is therapeutic. Warfarin regimen will be continued at current dose of 2.5mg TR and 5mg AOD. Will retest in 6 weeks. Patient understands dosing directions and information discussed. Dosing schedule and follow up appointment given to patient. Progress note routed to referring physicians office. Patient acknowledges working in consult agreement with pharmacist as referred by his/her physician.       Ivonne Alatorre, 5/28/2019 2:00 PM

## 2019-07-09 ENCOUNTER — HOSPITAL ENCOUNTER (OUTPATIENT)
Dept: PHARMACY | Age: 59
Setting detail: THERAPIES SERIES
Discharge: HOME | End: 2019-07-09
Payer: COMMERCIAL

## 2019-07-09 DIAGNOSIS — Z95.2 H/O MITRAL VALVE REPLACEMENT WITH MECHANICAL VALVE: ICD-10-CM

## 2019-07-09 LAB
INR BLD: 2.5
PROTIME: 29.6 SECONDS

## 2019-07-09 PROCEDURE — 99212 OFFICE O/P EST SF 10 MIN: CPT

## 2019-07-09 PROCEDURE — 85610 PROTHROMBIN TIME: CPT

## 2019-08-13 ENCOUNTER — HOSPITAL ENCOUNTER (OUTPATIENT)
Dept: PHARMACY | Age: 59
Setting detail: THERAPIES SERIES
Discharge: HOME | End: 2019-08-13
Payer: COMMERCIAL

## 2019-08-13 DIAGNOSIS — Z95.2 H/O MITRAL VALVE REPLACEMENT WITH MECHANICAL VALVE: ICD-10-CM

## 2019-08-13 LAB
INR BLD: 3.1
PROTIME: 37.2 SECONDS

## 2019-08-13 PROCEDURE — 85610 PROTHROMBIN TIME: CPT

## 2019-08-13 PROCEDURE — 99211 OFF/OP EST MAY X REQ PHY/QHP: CPT

## 2019-09-10 ENCOUNTER — TELEPHONE (OUTPATIENT)
Dept: PHARMACY | Age: 59
End: 2019-09-10

## 2019-09-10 DIAGNOSIS — Z95.2 H/O MITRAL VALVE REPLACEMENT WITH MECHANICAL VALVE: ICD-10-CM

## 2019-09-10 RX ORDER — WARFARIN SODIUM 5 MG/1
2.5-5 TABLET ORAL DAILY
Qty: 90 TABLET | Refills: 2 | Status: SHIPPED | OUTPATIENT
Start: 2019-09-10 | End: 2020-05-14 | Stop reason: SDUPTHER

## 2019-09-17 ENCOUNTER — HOSPITAL ENCOUNTER (OUTPATIENT)
Dept: PHARMACY | Age: 59
Setting detail: THERAPIES SERIES
Discharge: HOME | End: 2019-09-17
Payer: COMMERCIAL

## 2019-09-17 DIAGNOSIS — Z95.2 H/O MITRAL VALVE REPLACEMENT WITH MECHANICAL VALVE: ICD-10-CM

## 2019-09-17 LAB
INR BLD: 2.6
PROTIME: 30.6 SECONDS

## 2019-09-17 PROCEDURE — 99211 OFF/OP EST MAY X REQ PHY/QHP: CPT

## 2019-09-17 PROCEDURE — 85610 PROTHROMBIN TIME: CPT

## 2019-10-22 ENCOUNTER — HOSPITAL ENCOUNTER (OUTPATIENT)
Dept: PHARMACY | Age: 59
Setting detail: THERAPIES SERIES
Discharge: HOME | End: 2019-10-22
Payer: COMMERCIAL

## 2019-10-22 DIAGNOSIS — Z95.2 H/O MITRAL VALVE REPLACEMENT WITH MECHANICAL VALVE: ICD-10-CM

## 2019-10-22 LAB
INR BLD: 2.7
PROTIME: 32 SECONDS

## 2019-10-28 ENCOUNTER — TELEPHONE (OUTPATIENT)
Dept: PHARMACY | Age: 59
End: 2019-10-28

## 2019-11-19 ENCOUNTER — HOSPITAL ENCOUNTER (OUTPATIENT)
Dept: PHARMACY | Age: 59
Setting detail: THERAPIES SERIES
Discharge: HOME | End: 2019-11-19
Payer: COMMERCIAL

## 2019-11-19 DIAGNOSIS — Z95.2 H/O MITRAL VALVE REPLACEMENT WITH MECHANICAL VALVE: ICD-10-CM

## 2019-11-19 LAB
INR BLD: 2.6
PROTIME: 31.3 SECONDS

## 2019-11-19 PROCEDURE — 85610 PROTHROMBIN TIME: CPT

## 2019-11-19 PROCEDURE — 99211 OFF/OP EST MAY X REQ PHY/QHP: CPT

## 2019-11-19 RX ORDER — TAMSULOSIN HYDROCHLORIDE 0.4 MG/1
0.4 CAPSULE ORAL DAILY
COMMUNITY

## 2019-12-17 ENCOUNTER — HOSPITAL ENCOUNTER (OUTPATIENT)
Dept: PHARMACY | Age: 59
Setting detail: THERAPIES SERIES
Discharge: HOME | End: 2019-12-17
Payer: COMMERCIAL

## 2019-12-17 DIAGNOSIS — Z95.2 H/O MITRAL VALVE REPLACEMENT WITH MECHANICAL VALVE: ICD-10-CM

## 2019-12-17 LAB
INR BLD: 2.5
PROTIME: 30.5 SECONDS

## 2019-12-17 PROCEDURE — 99211 OFF/OP EST MAY X REQ PHY/QHP: CPT

## 2019-12-17 PROCEDURE — 85610 PROTHROMBIN TIME: CPT

## 2020-01-14 ENCOUNTER — HOSPITAL ENCOUNTER (OUTPATIENT)
Dept: PHARMACY | Age: 60
Setting detail: THERAPIES SERIES
Discharge: HOME | End: 2020-01-14
Payer: COMMERCIAL

## 2020-01-14 LAB
INR BLD: 2.4
PROTIME: 29.3 SECONDS

## 2020-01-14 PROCEDURE — 99212 OFFICE O/P EST SF 10 MIN: CPT

## 2020-01-14 PROCEDURE — 85610 PROTHROMBIN TIME: CPT

## 2020-01-14 NOTE — PROGRESS NOTES
Patient states compliant all of the time with regimen. No bleeding or thromboembolic side effects noted. No significant med or dietary changes. No significant recent illness or disease state changes. Patient states he had a beer the other day for his anniversary, has been eating a bit less vit K, takes Aleve occasionally, and has been a bit more active. Pain and stress levels are stable    PT/INR done in office per protocol. INR is 2.4 which is just below goal of 2.5-3.5. Warfarin regimen will be increased to 2.5mg thurs and 5mg AOD as INR has been trending down over the last few months. Will retest in 2 weeks to ensure stable. If INR is eventually more midrange, it is my hope that patient will not need to \"avoid\" vegetables as he has been lately due to INR being on the low end of therapeutic. Patient understands dosing directions and information discussed. Dosing schedule and follow up appointment given to patient. Progress note routed to referring physicians office. Patient acknowledges working in consult agreement with pharmacist as referred by his/her physician.       Padilla Zambrano, 1/14/2020 2:12 PM

## 2020-01-28 ENCOUNTER — HOSPITAL ENCOUNTER (OUTPATIENT)
Dept: PHARMACY | Age: 60
Setting detail: THERAPIES SERIES
Discharge: HOME | End: 2020-01-28
Payer: COMMERCIAL

## 2020-01-28 LAB
INR BLD: 2.5
PROTIME: 30.3 SECONDS

## 2020-01-28 PROCEDURE — 99211 OFF/OP EST MAY X REQ PHY/QHP: CPT

## 2020-01-28 PROCEDURE — 85610 PROTHROMBIN TIME: CPT

## 2020-02-11 ENCOUNTER — HOSPITAL ENCOUNTER (OUTPATIENT)
Dept: PHARMACY | Age: 60
Setting detail: THERAPIES SERIES
Discharge: HOME | End: 2020-02-11
Payer: COMMERCIAL

## 2020-02-11 LAB
INR BLD: 3
PROTIME: 36.2 SECONDS

## 2020-02-11 PROCEDURE — 99211 OFF/OP EST MAY X REQ PHY/QHP: CPT

## 2020-02-11 PROCEDURE — 85610 PROTHROMBIN TIME: CPT

## 2020-03-10 ENCOUNTER — HOSPITAL ENCOUNTER (OUTPATIENT)
Dept: PHARMACY | Age: 60
Setting detail: THERAPIES SERIES
Discharge: HOME | End: 2020-03-10
Payer: COMMERCIAL

## 2020-03-10 LAB
INR BLD: 2.7
PROTIME: 32.1 SECONDS

## 2020-03-10 PROCEDURE — 99211 OFF/OP EST MAY X REQ PHY/QHP: CPT

## 2020-03-10 PROCEDURE — 85610 PROTHROMBIN TIME: CPT

## 2020-03-10 NOTE — PROGRESS NOTES
Patient states compliant all of the time with regimen. No bleeding or thromboembolic side effects noted. No significant med or dietary changes. No significant recent illness or disease state changes. Patient reports taking 1/2 pill Aleve once daily about 4 times weekly regularly for neck/shoulder pain. Patient also reports one time having broccoli with dinner Sunday. PT/INR done in office per protocol. INR is 2.7 which is therapeutic. (goal 2.5-3.5)    Warfarin regimen will be continued at current dose 5mg daily. Will retest in 4 weeks. Patient understands dosing directions and information discussed. Dosing schedule and follow up appointment given to patient. Progress note routed to referring physicians office. Patient acknowledges working in consult agreement with pharmacist as referred by his/her physician.       Fanny Larson, 3/10/2020 2:09 PM

## 2020-05-14 RX ORDER — WARFARIN SODIUM 5 MG/1
TABLET ORAL
Qty: 90 TABLET | Refills: 3 | Status: SHIPPED | OUTPATIENT
Start: 2020-05-14 | End: 2021-05-04 | Stop reason: SDUPTHER

## 2020-05-19 ENCOUNTER — HOSPITAL ENCOUNTER (OUTPATIENT)
Dept: PHARMACY | Age: 60
Setting detail: THERAPIES SERIES
Discharge: HOME | End: 2020-05-19
Payer: COMMERCIAL

## 2020-05-19 LAB
INR BLD: 3.6
PROTIME: 43.1 SECONDS

## 2020-05-19 PROCEDURE — 99211 OFF/OP EST MAY X REQ PHY/QHP: CPT

## 2020-05-19 PROCEDURE — 85610 PROTHROMBIN TIME: CPT

## 2020-05-19 NOTE — PROGRESS NOTES
Spoke with patient via phone. Patient compliant all of the time with regimen. No bleeding or thromboembolic side effects noted. No significant dietary changes. Patient has been taking more Aleve than usual.  He used to take only half tablet but has recently had a few times he took a full. Educated patient on trying APAP or keeping dose of Aleve minimal and potentially increasing the vitamin K in his diet. No significant recent illness or disease state changes. PT/INR done in [facility] outpatient lab. INR is 3.6 which is just above goal.     Warfarin regimen will be continued at current dose of 5mg daily. Will have next INR drawn in 5 weeks. Patient given dosing directions and follow up appointment. Progress note routed to referring physicians office.     CLINICAL PHARMACY CONSULT: MED RECONCILIATION/REVIEW ADDENDUM    For Pharmacy Admin Tracking Only    PHSO: No  Total # of Interventions Recommended: 0  - Maintenance Safety Lab Monitoring #: 1  Total Interventions Accepted: 0  Time Spent (min): [address] PharmD

## 2020-06-23 ENCOUNTER — HOSPITAL ENCOUNTER (OUTPATIENT)
Dept: PHARMACY | Age: 60
Setting detail: THERAPIES SERIES
Discharge: HOME | End: 2020-06-23
Payer: COMMERCIAL

## 2020-06-23 ENCOUNTER — HOSPITAL ENCOUNTER (OUTPATIENT)
Age: 60
Setting detail: SPECIMEN
Discharge: HOME | End: 2020-06-23
Payer: COMMERCIAL

## 2020-06-23 VITALS — TEMPERATURE: 97.2 F

## 2020-06-23 LAB
ALBUMIN SERPL-MCNC: 4.1 G/DL (ref 3.5–5.2)
ALBUMIN/GLOBULIN RATIO: 1.6 (ref 1–2.5)
ALP BLD-CCNC: 69 U/L (ref 40–129)
ALT SERPL-CCNC: 33 U/L (ref 5–41)
AST SERPL-CCNC: 28 U/L
BILIRUB SERPL-MCNC: 0.82 MG/DL (ref 0.3–1.2)
BILIRUBIN DIRECT: 0.14 MG/DL
BILIRUBIN, INDIRECT: 0.68 MG/DL (ref 0–1)
GLOBULIN: NORMAL G/DL (ref 1.5–3.8)
HCT VFR BLD CALC: 48 % (ref 40.7–50.3)
HCT VFR BLD CALC: 48 % (ref 40.7–50.3)
HEMOGLOBIN: 15.6 G/DL (ref 13–17)
HEMOGLOBIN: 15.6 G/DL (ref 13–17)
INR BLD: 2.8
MCH RBC QN AUTO: 28.9 PG (ref 25.2–33.5)
MCH RBC QN AUTO: 28.9 PG (ref 25.2–33.5)
MCHC RBC AUTO-ENTMCNC: 32.5 G/DL (ref 28.4–34.8)
MCHC RBC AUTO-ENTMCNC: 32.5 G/DL (ref 28.4–34.8)
MCV RBC AUTO: 89.1 FL (ref 82.6–102.9)
MCV RBC AUTO: 89.1 FL (ref 82.6–102.9)
NRBC AUTOMATED: 0 PER 100 WBC
NRBC AUTOMATED: 0 PER 100 WBC
PDW BLD-RTO: 13.2 % (ref 11.8–14.4)
PDW BLD-RTO: 13.2 % (ref 11.8–14.4)
PLATELET # BLD: 124 K/UL (ref 138–453)
PLATELET # BLD: 124 K/UL (ref 138–453)
PMV BLD AUTO: 12.9 FL (ref 8.1–13.5)
PMV BLD AUTO: 12.9 FL (ref 8.1–13.5)
PROSTATE SPECIFIC ANTIGEN: 1.67 UG/L
PROSTATE SPECIFIC ANTIGEN: 1.67 UG/L
PROTIME: 33.8 SECONDS
RBC # BLD: 5.39 M/UL (ref 4.21–5.77)
RBC # BLD: 5.39 M/UL (ref 4.21–5.77)
SEX HORMONE BINDING GLOBULIN: 43 NMOL/L (ref 11–80)
TESTOSTERONE FREE-NONMALE: 185.7 PG/ML (ref 47–244)
TESTOSTERONE TOTAL: 921 NG/DL (ref 220–1000)
TESTOSTERONE, BIOAVAILABLE: 435.2 NG/DL (ref 130–680)
TOTAL PROTEIN: 6.6 G/DL (ref 6.4–8.3)
WBC # BLD: 5.9 K/UL (ref 3.5–11.3)
WBC # BLD: 5.9 K/UL (ref 3.5–11.3)

## 2020-06-23 PROCEDURE — 99211 OFF/OP EST MAY X REQ PHY/QHP: CPT

## 2020-06-23 PROCEDURE — G0103 PSA SCREENING: HCPCS

## 2020-06-23 PROCEDURE — 80076 HEPATIC FUNCTION PANEL: CPT

## 2020-06-23 PROCEDURE — 84403 ASSAY OF TOTAL TESTOSTERONE: CPT

## 2020-06-23 PROCEDURE — 84270 ASSAY OF SEX HORMONE GLOBUL: CPT

## 2020-06-23 PROCEDURE — 36415 COLL VENOUS BLD VENIPUNCTURE: CPT

## 2020-06-23 PROCEDURE — 85610 PROTHROMBIN TIME: CPT

## 2020-06-23 PROCEDURE — 85027 COMPLETE CBC AUTOMATED: CPT

## 2020-07-28 ENCOUNTER — APPOINTMENT (OUTPATIENT)
Dept: PHARMACY | Age: 60
End: 2020-07-28
Payer: COMMERCIAL

## 2020-08-04 ENCOUNTER — HOSPITAL ENCOUNTER (OUTPATIENT)
Dept: PHARMACY | Age: 60
Setting detail: THERAPIES SERIES
Discharge: HOME | End: 2020-08-04
Payer: COMMERCIAL

## 2020-08-04 VITALS — TEMPERATURE: 97.8 F

## 2020-08-04 LAB
INR BLD: 2.5
PROTIME: 29.4 SECONDS

## 2020-08-04 PROCEDURE — 99211 OFF/OP EST MAY X REQ PHY/QHP: CPT

## 2020-08-04 PROCEDURE — 85610 PROTHROMBIN TIME: CPT

## 2020-08-04 NOTE — PROGRESS NOTES
Patient states compliant all of the time with regimen. No bleeding or thromboembolic side effects noted. No significant med or dietary changes. No significant recent illness or disease state changes. PT/INR done in office per protocol. INR is 2.5 which is therapeutic. Warfarin regimen will be continued at current dose of 5mg daily. Will retest in 6 weeks. Patient understands dosing directions and information discussed. Dosing schedule and follow up appointment given to patient. Progress note routed to referring physicians office. Discussed with patient the Pharmacist Collaborative Practice Agreement. Patient provided verbal and/or electronic (ex. [employer]) consent to participate in the collaborative practice agreement between the pharmacist and referred patient. This is in lieu of paper consent due to COVID-19 precautions and the use of remote/virtual visits.        CLINICAL PHARMACY CONSULT: MED RECONCILIATION/REVIEW ADDENDUM    For Pharmacy Admin Tracking Only    PHSO: No  Total # of Interventions Recommended: 0  - Maintenance Safety Lab Monitoring #: 1  Total Interventions Accepted: 0  Time Spent (min): [address] PharmD

## 2020-09-15 ENCOUNTER — HOSPITAL ENCOUNTER (OUTPATIENT)
Dept: PHARMACY | Age: 60
Setting detail: THERAPIES SERIES
Discharge: HOME | End: 2020-09-15
Payer: COMMERCIAL

## 2020-09-15 VITALS — TEMPERATURE: 97.7 F

## 2020-09-15 LAB
INR BLD: 3.1
PROTIME: 36.6 SECONDS

## 2020-09-15 PROCEDURE — 85610 PROTHROMBIN TIME: CPT

## 2020-09-15 PROCEDURE — 99211 OFF/OP EST MAY X REQ PHY/QHP: CPT

## 2020-09-15 NOTE — PROGRESS NOTES
Patient states compliant all of the time with regimen. No bleeding or thromboembolic side effects noted. No significant med or dietary changes. No significant recent illness or disease state changes. PT/INR done in office per protocol. INR is 3.1 which is therapeutic. Warfarin regimen will be continued at current dose of 5mg daily. Will retest in 6 weeks. Patient understands dosing directions and information discussed. Dosing schedule and follow up appointment given to patient. Progress note routed to referring physicians office. Discussed with patient the Pharmacist Collaborative Practice Agreement. Patient provided verbal and/or electronic (ex. [employer]) consent to participate in the collaborative practice agreement between the pharmacist and referred patient. This is in lieu of paper consent due to COVID-19 precautions and the use of remote/virtual visits.        CLINICAL PHARMACY CONSULT: MED RECONCILIATION/REVIEW ADDENDUM    For Pharmacy Admin Tracking Only    PHSO: No  Total # of Interventions Recommended: 0  - Maintenance Safety Lab Monitoring #: 1  Total Interventions Accepted: 0  Time Spent (min): [address] PharmD

## 2020-10-27 ENCOUNTER — HOSPITAL ENCOUNTER (OUTPATIENT)
Dept: PHARMACY | Age: 60
Setting detail: THERAPIES SERIES
Discharge: HOME | End: 2020-10-27
Payer: COMMERCIAL

## 2020-10-27 LAB
INR BLD: 3.8
PROTIME: 45.4 SECONDS

## 2020-10-27 PROCEDURE — 85610 PROTHROMBIN TIME: CPT

## 2020-10-27 PROCEDURE — 99212 OFFICE O/P EST SF 10 MIN: CPT

## 2020-10-27 NOTE — PROGRESS NOTES
Patient seen in clinic for warfarin management due to atrial fibrillation and mitral valve replacement with an INR goal of 2.5-3.5. Estimated duration of therapy is indefinite. Patient states compliant all of the time with regimen. No bleeding or thromboembolic side effects noted. Patient states he is taking Aleve more regularly - 1/2 tablet daily for his foot pain. He has not been eating as many brussel sprouts as they are hard to find in the store. PT/INR done in office per protocol. INR is 3.8 which is supratherapeutic. Warfarin regimen will be decreased to 2.5mg tues and 5mg all other days. Will retest in 2 weeks. Patient understands dosing directions and information discussed. Dosing schedule and follow up appointment given to patient. Progress note routed to referring physicians office. Discussed with patient the Pharmacist Collaborative Practice Agreement. Patient provided verbal and/or electronic (ex. mychart) consent to participate in the collaborative practice agreement between the pharmacist and referred patient. This is in lieu of paper consent due to COVID-19 precautions and the use of remote/virtual visits.        CLINICAL PHARMACY CONSULT: MED RECONCILIATION/REVIEW ADDENDUM    For Pharmacy Admin Tracking Only    PHSO: No  Total # of Interventions Recommended: 1  - Decreased Dose #: 1  - Maintenance Safety Lab Monitoring #: 1  Total Interventions Accepted: 1  Time Spent (min): [address], [address] [place] [address]

## 2020-11-10 ENCOUNTER — HOSPITAL ENCOUNTER (OUTPATIENT)
Dept: PHARMACY | Age: 60
Setting detail: THERAPIES SERIES
Discharge: HOME | End: 2020-11-10
Payer: COMMERCIAL

## 2020-11-10 VITALS — TEMPERATURE: 96.8 F

## 2020-11-10 LAB
INR BLD: 3.5
PROTIME: 42.3 SECONDS

## 2020-11-10 PROCEDURE — 85610 PROTHROMBIN TIME: CPT

## 2020-11-10 PROCEDURE — 99211 OFF/OP EST MAY X REQ PHY/QHP: CPT

## 2020-12-08 ENCOUNTER — HOSPITAL ENCOUNTER (OUTPATIENT)
Dept: PHARMACY | Age: 60
Setting detail: THERAPIES SERIES
Discharge: HOME | End: 2020-12-08
Payer: COMMERCIAL

## 2020-12-08 LAB
INR BLD: 3.4
PROTIME: 41.2 SECONDS

## 2020-12-08 PROCEDURE — 85610 PROTHROMBIN TIME: CPT

## 2020-12-08 PROCEDURE — 99211 OFF/OP EST MAY X REQ PHY/QHP: CPT

## 2020-12-08 NOTE — PROGRESS NOTES
Patient seen in clinic for warfarin management due to mechanical mitral valve with an INR goal of 2.5-3.5. Estimated duration of therapy is indefinite. Patient states compliant all of the time with regimen. No bleeding or thromboembolic side effects noted. No significant med or dietary changes. No significant recent illness or disease state changes. Patient consistently uses 1/2 tablet of Aleve daily and eats brussel sprouts every 3-4 days to counteract the INR elevating effects of Aleve. PT/INR done in office per protocol. INR is 3.4 which is therapeutic. Warfarin regimen will be continued at current dose of 2.5mg tues and 5mg all other days. Will retest in 4 weeks. Patient understands dosing directions and information discussed. Dosing schedule and follow up appointment given to patient. Progress note routed to referring physicians office. Discussed with patient the Pharmacist Collaborative Practice Agreement. Patient provided verbal and/or electronic (ex. [employer]) consent to participate in the collaborative practice agreement between the pharmacist and referred patient. This is in lieu of paper consent due to COVID-19 precautions and the use of remote/virtual visits.        CLINICAL PHARMACY CONSULT: MED RECONCILIATION/REVIEW ADDENDUM    For Pharmacy Admin Tracking Only    PHSO: No  Total # of Interventions Recommended: 0  - Maintenance Safety Lab Monitoring #: 1  Total Interventions Accepted: 0  Time Spent (min): [address], [address] [place] [address]

## 2020-12-30 ENCOUNTER — TELEPHONE (OUTPATIENT)
Dept: PHARMACY | Age: 60
End: 2020-12-30

## 2020-12-30 NOTE — TELEPHONE ENCOUNTER
Pt notified the clinic that he will be starting Zetia for cholesterol. Advised that this med may potentially increase INR but to keep his warfarin dose the same for now and we will watch his INR trends; he is already scheduled for an INR appt next week on 1/5/21.

## 2021-01-05 ENCOUNTER — HOSPITAL ENCOUNTER (OUTPATIENT)
Age: 61
Discharge: HOME | End: 2021-01-05
Payer: COMMERCIAL

## 2021-01-05 ENCOUNTER — HOSPITAL ENCOUNTER (OUTPATIENT)
Dept: PHARMACY | Age: 61
Setting detail: THERAPIES SERIES
Discharge: HOME | End: 2021-01-05
Payer: COMMERCIAL

## 2021-01-05 VITALS — TEMPERATURE: 97.7 F

## 2021-01-05 DIAGNOSIS — Z95.2 H/O MITRAL VALVE REPLACEMENT WITH MECHANICAL VALVE: ICD-10-CM

## 2021-01-05 LAB
ALBUMIN SERPL-MCNC: 4.5 G/DL (ref 3.5–5.2)
ALBUMIN/GLOBULIN RATIO: 1.8 (ref 1–2.5)
ALP BLD-CCNC: 79 U/L (ref 40–129)
ALT SERPL-CCNC: 46 U/L (ref 5–41)
AST SERPL-CCNC: 33 U/L
BILIRUB SERPL-MCNC: 0.87 MG/DL (ref 0.3–1.2)
BILIRUBIN DIRECT: 0.17 MG/DL
BILIRUBIN, INDIRECT: 0.7 MG/DL (ref 0–1)
GLOBULIN: ABNORMAL G/DL (ref 1.5–3.8)
HCT VFR BLD CALC: 47 % (ref 40.7–50.3)
HEMOGLOBIN: 15.2 G/DL (ref 13–17)
INR BLD: 2.7
MCH RBC QN AUTO: 28.4 PG (ref 25.2–33.5)
MCHC RBC AUTO-ENTMCNC: 32.3 G/DL (ref 28.4–34.8)
MCV RBC AUTO: 87.7 FL (ref 82.6–102.9)
NRBC AUTOMATED: 0 PER 100 WBC
PDW BLD-RTO: 13.2 % (ref 11.8–14.4)
PLATELET # BLD: 131 K/UL (ref 138–453)
PMV BLD AUTO: 12.6 FL (ref 8.1–13.5)
PROSTATE SPECIFIC ANTIGEN: 1.74 UG/L
PROTIME: 32.1 SECONDS
RBC # BLD: 5.36 M/UL (ref 4.21–5.77)
TOTAL PROTEIN: 7 G/DL (ref 6.4–8.3)
WBC # BLD: 5.5 K/UL (ref 3.5–11.3)

## 2021-01-05 PROCEDURE — 84153 ASSAY OF PSA TOTAL: CPT

## 2021-01-05 PROCEDURE — 36415 COLL VENOUS BLD VENIPUNCTURE: CPT

## 2021-01-05 PROCEDURE — 99211 OFF/OP EST MAY X REQ PHY/QHP: CPT

## 2021-01-05 PROCEDURE — 85610 PROTHROMBIN TIME: CPT

## 2021-01-05 PROCEDURE — 85027 COMPLETE CBC AUTOMATED: CPT

## 2021-01-05 PROCEDURE — 80076 HEPATIC FUNCTION PANEL: CPT

## 2021-01-05 RX ORDER — EZETIMIBE 10 MG/1
10 TABLET ORAL DAILY
COMMUNITY
End: 2022-04-12 | Stop reason: ALTCHOICE

## 2021-01-05 NOTE — PROGRESS NOTES
Patient seen in clinic for warfarin management due to mechanical mitral valve with an INR goal of 2.5-3.5. Estimated duration of therapy is indefinite. Patient states compliant all of the time with regimen. No bleeding or thromboembolic side effects noted. No significant med or dietary changes. No significant recent illness or disease state changes. PT/INR done in office per protocol. INR is 2.7 which is therapeutic. Warfarin regimen will be continued at current dose of 2.5mg Tuesday and 5mg all other days. Will retest in 4 weeks. Patient understands dosing directions and information discussed. Dosing schedule and follow up appointment given to patient. Progress note routed to referring physicians office. Discussed with patient the Pharmacist Collaborative Practice Agreement. Patient provided verbal and/or electronic (ex. [employer]) consent to participate in the collaborative practice agreement between the pharmacist and referred patient. This is in lieu of paper consent due to COVID-19 precautions and the use of remote/virtual visits.        CLINICAL PHARMACY CONSULT: MED RECONCILIATION/REVIEW ADDENDUM    For Pharmacy Admin Tracking Only    PHSO: No  Total # of Interventions Recommended: 0  - Maintenance Safety Lab Monitoring #: 1  Total Interventions Accepted: 0  Time Spent (min): [address] PharmD

## 2021-02-02 ENCOUNTER — HOSPITAL ENCOUNTER (OUTPATIENT)
Dept: PHARMACY | Age: 61
Setting detail: THERAPIES SERIES
Discharge: HOME | End: 2021-02-02
Payer: COMMERCIAL

## 2021-02-02 VITALS — TEMPERATURE: 95.9 F

## 2021-02-02 DIAGNOSIS — Z95.2 H/O MITRAL VALVE REPLACEMENT WITH MECHANICAL VALVE: ICD-10-CM

## 2021-02-02 LAB
INR BLD: 3.6
PROTIME: 43 SECONDS

## 2021-02-02 PROCEDURE — 99211 OFF/OP EST MAY X REQ PHY/QHP: CPT

## 2021-02-02 PROCEDURE — 85610 PROTHROMBIN TIME: CPT

## 2021-02-02 NOTE — PROGRESS NOTES
Patient seen in clinic for warfarin management due to mechanical mitral valve with an INR goal of 2.5-3.5. Estimated duration of therapy is indefinite. Patient states compliant all of the time with regimen. No bleeding or thromboembolic side effects noted. Patient states he did not eat as many plates of green vegetables as normal over the last week. Normally has a small plate every 2-3 nights. No significant recent illness or disease state changes. PT/INR done in office per protocol. INR is 3.6 which is just above goal.     Warfarin regimen will be continued at current dose 2.5 mg every Tuesday, 5mg all other days. Will retest in 4 weeks. Patient understands dosing directions and information discussed. Dosing schedule and follow up appointment given to patient. Progress note routed to referring physicians office. Discussed with patient the Pharmacist Collaborative Practice Agreement. Patient provided verbal and/or electronic (ex. [employer]) consent to participate in the collaborative practice agreement between the pharmacist and referred patient. This is in lieu of paper consent due to COVID-19 precautions and the use of remote/virtual visits.      CLINICAL PHARMACY CONSULT: MED RECONCILIATION/REVIEW ADDENDUM    For Pharmacy Admin Tracking Only    PHSO: No  Total # of Interventions Recommended: 0  - Maintenance Safety Lab Monitoring #: 1  Total Interventions Accepted: 0  Time Spent (min): [address], [address] [place] [address]

## 2021-02-15 ENCOUNTER — HOSPITAL ENCOUNTER (OUTPATIENT)
Age: 61
Discharge: HOME | End: 2021-02-15
Payer: COMMERCIAL

## 2021-02-15 LAB
ABSOLUTE EOS #: 0.15 K/UL (ref 0–0.44)
ABSOLUTE IMMATURE GRANULOCYTE: 0.03 K/UL (ref 0–0.3)
ABSOLUTE LYMPH #: 1.57 K/UL (ref 1.1–3.7)
ABSOLUTE MONO #: 0.59 K/UL (ref 0.1–1.2)
BASOPHILS # BLD: 1 % (ref 0–2)
BASOPHILS ABSOLUTE: 0.04 K/UL (ref 0–0.2)
DIFFERENTIAL TYPE: ABNORMAL
EOSINOPHILS RELATIVE PERCENT: 2 % (ref 1–4)
HCT VFR BLD CALC: 48 % (ref 40.7–50.3)
HEMOGLOBIN: 15.5 G/DL (ref 13–17)
IMMATURE GRANULOCYTES: 0 %
LYMPHOCYTES # BLD: 23 % (ref 24–43)
MCH RBC QN AUTO: 28.8 PG (ref 25.2–33.5)
MCHC RBC AUTO-ENTMCNC: 32.3 G/DL (ref 28.4–34.8)
MCV RBC AUTO: 89.2 FL (ref 82.6–102.9)
MONOCYTES # BLD: 9 % (ref 3–12)
NRBC AUTOMATED: 0 PER 100 WBC
PDW BLD-RTO: 13.1 % (ref 11.8–14.4)
PLATELET # BLD: ABNORMAL K/UL (ref 138–453)
PLATELET ESTIMATE: ABNORMAL
PLATELET, FLUORESCENCE: 129 K/UL (ref 138–453)
PLATELET, IMMATURE FRACTION: 11.9 % (ref 1.1–10.3)
PMV BLD AUTO: ABNORMAL FL (ref 8.1–13.5)
RBC # BLD: 5.38 M/UL (ref 4.21–5.77)
RBC # BLD: ABNORMAL 10*6/UL
SEG NEUTROPHILS: 65 % (ref 36–65)
SEGMENTED NEUTROPHILS ABSOLUTE COUNT: 4.41 K/UL (ref 1.5–8.1)
SEX HORMONE BINDING GLOBULIN: 39 NMOL/L (ref 11–80)
TESTOSTERONE FREE-NONMALE: 170.9 PG/ML (ref 47–244)
TESTOSTERONE TOTAL: 819 NG/DL (ref 220–1000)
TESTOSTERONE, BIOAVAILABLE: 400.5 NG/DL (ref 130–680)
WBC # BLD: 6.8 K/UL (ref 3.5–11.3)
WBC # BLD: ABNORMAL 10*3/UL

## 2021-02-15 PROCEDURE — 36415 COLL VENOUS BLD VENIPUNCTURE: CPT

## 2021-02-15 PROCEDURE — 84403 ASSAY OF TOTAL TESTOSTERONE: CPT

## 2021-02-15 PROCEDURE — 84270 ASSAY OF SEX HORMONE GLOBUL: CPT

## 2021-02-15 PROCEDURE — 85055 RETICULATED PLATELET ASSAY: CPT

## 2021-02-15 PROCEDURE — 85025 COMPLETE CBC W/AUTO DIFF WBC: CPT

## 2021-02-20 ENCOUNTER — APPOINTMENT (OUTPATIENT)
Dept: CT IMAGING | Age: 61
End: 2021-02-20
Payer: COMMERCIAL

## 2021-02-20 ENCOUNTER — HOSPITAL ENCOUNTER (OUTPATIENT)
Age: 61
Setting detail: OBSERVATION
Discharge: HOME | End: 2021-02-20
Attending: EMERGENCY MEDICINE | Admitting: STUDENT IN AN ORGANIZED HEALTH CARE EDUCATION/TRAINING PROGRAM
Payer: COMMERCIAL

## 2021-02-20 ENCOUNTER — APPOINTMENT (OUTPATIENT)
Dept: GENERAL RADIOLOGY | Age: 61
End: 2021-02-20
Payer: COMMERCIAL

## 2021-02-20 VITALS
HEART RATE: 61 BPM | DIASTOLIC BLOOD PRESSURE: 98 MMHG | BODY MASS INDEX: 35.25 KG/M2 | RESPIRATION RATE: 16 BRPM | WEIGHT: 238 LBS | OXYGEN SATURATION: 98 % | HEIGHT: 69 IN | TEMPERATURE: 98.6 F | SYSTOLIC BLOOD PRESSURE: 150 MMHG
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
## 2021-02-20 DIAGNOSIS — R90.89 ABNORMAL CT OF BRAIN: ICD-10-CM

## 2021-02-20 PROBLEM — I48.91 ATRIAL FIBRILLATION (HCC): Status: ACTIVE | Noted: 2017-10-17

## 2021-02-20 LAB
ABSOLUTE EOS #: 0.18 K/UL (ref 0–0.44)
ABSOLUTE IMMATURE GRANULOCYTE: 0.03 K/UL (ref 0–0.3)
ABSOLUTE LYMPH #: 1.76 K/UL (ref 1.1–3.7)
ABSOLUTE MONO #: 0.54 K/UL (ref 0.1–1.2)
ANION GAP SERPL CALCULATED.3IONS-SCNC: 8 MMOL/L (ref 9–17)
BASOPHILS # BLD: 1 % (ref 0–2)
BASOPHILS ABSOLUTE: 0.03 K/UL (ref 0–0.2)
BUN BLDV-MCNC: 22 MG/DL (ref 8–23)
BUN/CREAT BLD: ABNORMAL (ref 9–20)
CALCIUM SERPL-MCNC: 9.1 MG/DL (ref 8.6–10.4)
CHLORIDE BLD-SCNC: 103 MMOL/L (ref 98–107)
CO2: 26 MMOL/L (ref 20–31)
CREAT SERPL-MCNC: 0.82 MG/DL (ref 0.7–1.2)
DIFFERENTIAL TYPE: ABNORMAL
EOSINOPHILS RELATIVE PERCENT: 3 % (ref 1–4)
GFR AFRICAN AMERICAN: >60 ML/MIN
GFR NON-AFRICAN AMERICAN: >60 ML/MIN
GFR SERPL CREATININE-BSD FRML MDRD: ABNORMAL ML/MIN/{1.73_M2}
GFR SERPL CREATININE-BSD FRML MDRD: ABNORMAL ML/MIN/{1.73_M2}
GLUCOSE BLD-MCNC: 100 MG/DL (ref 70–99)
HCT VFR BLD CALC: 46.8 % (ref 40.7–50.3)
HEMOGLOBIN: 15.2 G/DL (ref 13–17)
IMMATURE GRANULOCYTES: 1 %
INR BLD: 3
LYMPHOCYTES # BLD: 30 % (ref 24–43)
MAGNESIUM: 1.9 MG/DL (ref 1.6–2.6)
MCH RBC QN AUTO: 28.6 PG (ref 25.2–33.5)
MCHC RBC AUTO-ENTMCNC: 32.5 G/DL (ref 28.4–34.8)
MCV RBC AUTO: 88.1 FL (ref 82.6–102.9)
MONOCYTES # BLD: 9 % (ref 3–12)
NRBC AUTOMATED: 0 PER 100 WBC
PARTIAL THROMBOPLASTIN TIME: 38.2 SEC (ref 20.5–30.5)
PDW BLD-RTO: 13.1 % (ref 11.8–14.4)
PLATELET # BLD: 136 K/UL (ref 138–453)
PLATELET ESTIMATE: ABNORMAL
PMV BLD AUTO: 12.4 FL (ref 8.1–13.5)
POTASSIUM SERPL-SCNC: 3.8 MMOL/L (ref 3.7–5.3)
PROTHROMBIN TIME: 29.1 SEC (ref 9.1–12.3)
RBC # BLD: 5.31 M/UL (ref 4.21–5.77)
RBC # BLD: ABNORMAL 10*6/UL
SEG NEUTROPHILS: 56 % (ref 36–65)
SEGMENTED NEUTROPHILS ABSOLUTE COUNT: 3.29 K/UL (ref 1.5–8.1)
SODIUM BLD-SCNC: 137 MMOL/L (ref 135–144)
TROPONIN INTERP: NORMAL
TROPONIN T: NORMAL NG/ML
TROPONIN, HIGH SENSITIVITY: 10 NG/L (ref 0–22)
WBC # BLD: 5.8 K/UL (ref 3.5–11.3)
WBC # BLD: ABNORMAL 10*3/UL

## 2021-02-20 PROCEDURE — 80048 BASIC METABOLIC PNL TOTAL CA: CPT

## 2021-02-20 PROCEDURE — 99219 PR INITIAL OBSERVATION CARE/DAY 50 MINUTES: CPT | Performed by: PSYCHIATRY & NEUROLOGY

## 2021-02-20 PROCEDURE — 85025 COMPLETE CBC W/AUTO DIFF WBC: CPT

## 2021-02-20 PROCEDURE — 71045 X-RAY EXAM CHEST 1 VIEW: CPT

## 2021-02-20 PROCEDURE — 96372 THER/PROPH/DIAG INJ SC/IM: CPT

## 2021-02-20 PROCEDURE — 83735 ASSAY OF MAGNESIUM: CPT

## 2021-02-20 PROCEDURE — G0378 HOSPITAL OBSERVATION PER HR: HCPCS

## 2021-02-20 PROCEDURE — 70450 CT HEAD/BRAIN W/O DYE: CPT

## 2021-02-20 PROCEDURE — 85610 PROTHROMBIN TIME: CPT

## 2021-02-20 PROCEDURE — 6360000002 HC RX W HCPCS: Performed by: STUDENT IN AN ORGANIZED HEALTH CARE EDUCATION/TRAINING PROGRAM

## 2021-02-20 PROCEDURE — 93005 ELECTROCARDIOGRAM TRACING: CPT | Performed by: STUDENT IN AN ORGANIZED HEALTH CARE EDUCATION/TRAINING PROGRAM

## 2021-02-20 PROCEDURE — 99284 EMERGENCY DEPT VISIT MOD MDM: CPT

## 2021-02-20 PROCEDURE — 72125 CT NECK SPINE W/O DYE: CPT

## 2021-02-20 PROCEDURE — 85730 THROMBOPLASTIN TIME PARTIAL: CPT

## 2021-02-20 PROCEDURE — 84484 ASSAY OF TROPONIN QUANT: CPT

## 2021-02-20 RX ORDER — ORPHENADRINE CITRATE 30 MG/ML
60 INJECTION INTRAMUSCULAR; INTRAVENOUS ONCE
Status: COMPLETED | OUTPATIENT
Start: 2021-02-20 | End: 2021-02-20

## 2021-02-20 RX ORDER — SODIUM CHLORIDE 0.9 % (FLUSH) 0.9 %
10 SYRINGE (ML) INJECTION EVERY 12 HOURS SCHEDULED
Status: DISCONTINUED | OUTPATIENT
Start: 2021-02-20 | End: 2021-02-20 | Stop reason: HOSPADM

## 2021-02-20 RX ORDER — SODIUM CHLORIDE 0.9 % (FLUSH) 0.9 %
10 SYRINGE (ML) INJECTION PRN
Status: DISCONTINUED | OUTPATIENT
Start: 2021-02-20 | End: 2021-02-20 | Stop reason: HOSPADM

## 2021-02-20 RX ORDER — ACETAMINOPHEN 325 MG/1
650 TABLET ORAL EVERY 4 HOURS PRN
Status: DISCONTINUED | OUTPATIENT
Start: 2021-02-20 | End: 2021-02-20 | Stop reason: HOSPADM

## 2021-02-20 RX ORDER — KETOROLAC TROMETHAMINE 15 MG/ML
15 INJECTION, SOLUTION INTRAMUSCULAR; INTRAVENOUS ONCE
Status: DISCONTINUED | OUTPATIENT
Start: 2021-02-20 | End: 2021-02-20 | Stop reason: HOSPADM

## 2021-02-20 RX ADMIN — ORPHENADRINE CITRATE 60 MG: 60 INJECTION INTRAMUSCULAR; INTRAVENOUS at 01:39

## 2021-02-20 ASSESSMENT — ENCOUNTER SYMPTOMS
COUGH: 0
SHORTNESS OF BREATH: 0
ABDOMINAL PAIN: 0
SORE THROAT: 0
PHOTOPHOBIA: 0
VOMITING: 0
NAUSEA: 0

## 2021-02-20 ASSESSMENT — PAIN DESCRIPTION - LOCATION: LOCATION: HEAD

## 2021-02-20 NOTE — ED PROVIDER NOTES
[facility]     Emergency Department     Faculty Attestation    I performed a history and physical examination of the patient and discussed management with the resident. I have reviewed and agree with the residents findings including all diagnostic interpretations, and treatment plans as written. Any areas of disagreement are noted on the chart. I was personally present for the key portions of any procedures. I have documented in the chart those procedures where I was not present during the key portions. I have reviewed the emergency nurses triage note. I agree with the chief complaint, past medical history, past surgical history, allergies, medications, social and family history as documented unless otherwise noted below. Documentation of the HPI, Physical Exam and Medical Decision Making performed by scribmichelle is based on my personal performance of the HPI, PE and MDM. For Physician Assistant/ Nurse Practitioner cases/documentation I have personally evaluated this patient and have completed at least one if not all key elements of the E/M (history, physical exam, and MDM). Additional findings are as noted. 64 yo M s/p fall, hitting head, on coumadin, c/o ha & neck pain, no vomit,   pe 178/106, delma, collar in place, no cervical deformity or crepitus, chest non tender, abdomen non tender, no distension, no rigidity, moving extremities x 4,     Ct head hypodensity L frontal lobe,   Ct neck no acute  neuro consulted > obs admit for mri      EKG Interpretation    Interpreted by me  Sinus rhythm, heart rate 65, interventricular conduction delay, PVCs noted QT corrected 505 (similar to ekg 12-)    CRITICAL CARE: There was a high probability of clinically significant/life threatening deterioration in this patient's condition which required my urgent intervention. Total critical care time was 10 minutes.   This excludes any time for separately reportable procedures.        [facility] 24, DO  02/20/21 0132       Tamara Bee, DO  02/20/21 Marika 2, DO  02/20/21 Jacques Ureña, DO  02/20/21 [id number]

## 2021-02-20 NOTE — CARE COORDINATION
Case Management Initial Discharge Plan  Heriberto Hoang,             Met with:patient to discuss discharge plans. Information verified: address, contacts, phone number, , insurance Yes    Emergency Contact/Next of Kin name & number: Adrian Ferro [phone]    PCP: Shahriar Godfrey MD  Date of last visit: 7 weeks ago     Insurance Provider: [employer] [facility]     Discharge Planning    Living Arrangements:  Spouse/Significant Other   Support Systems:  Spouse/Significant Other, Family Members, Friends/Neighbors    Home has 2 stories  4 stairs to climb to get into front door, 1 flight stairs to climb to reach second floor  Location of bedroom/bathroom in home second floor     Patient able to perform ADL's:Independent    Current Services (outpatient & in home) none   DME equipment: none   DME provider: na     Receiving oral anticoagulation therapy? Yes    If indicated:   Physician managing anticoagulation treatment: Yael Welsh   Where does patient obtain lab work for ATC treatment? [facility]      Potential Assistance Needed:  N/A    Patient agreeable to home care: No  [place] of choice provided:  n/a    Prior SNF/Rehab Placement and Facility: none   Agreeable to SNF/Rehab: No  [place] of choice provided: n/a     Evaluation: no    Expected Discharge date:       Patient expects to be discharged to:  Home  Follow Up Appointment: Best Day/ Time:      Transportation provider: Self   Transportation arrangements needed for discharge: No    Readmission Risk              Risk of Unplanned Readmission:        0             Does patient have a readmission risk score greater than 14?: No  If yes, follow-up appointment must be made within 7 days of discharge. Goals of Care:  To get my head checked out and go home       Discharge Plan: home independently           Electronically signed by Olya Marroquin RN on 21 at 10:48 AM EST

## 2021-02-20 NOTE — ED NOTES
[facility]. All questions answered.       Edyta Ge RN  02/20/21 4573

## 2021-02-20 NOTE — ED PROVIDER NOTES
[facility]  ED  Emergency Department Encounter  EmergencyMedicine Resident     Pt Name:Denis Copeland  MRN: [id number]  Birthdate 1960  Date of evaluation: 2/20/21  PCP:  Chitra White MD    CHIEF COMPLAINT       Chief Complaint   Patient presents with    Fall    Head Injury       HISTORY OF PRESENT ILLNESS  (Location/Symptom, Timing/Onset, Context/Setting, Quality, Duration, Modifying Factors, Severity.)      Keren Tavares is a 63 y.o. male who presents with fall on Coumadin. Patient notes that he is on Coumadin due to history of proximal A. fib and mechanical mitral valve requiring an INR of 2.5-3.5. Patient notes that today at work he slipped on a patch of ice and hit the back of his head. Patient denies having any kind of presyncopal symptoms and just lost his balance to the ice. He denies any loss of consciousness and was able to ambulate at the scene. Patient is known posterior throbbing headache that is nonradiating associated with no photophobia or change in vision. Patient at this time is noting some pain in his neck though cannot tell me that midline versus to the side. Patient is denying any, fevers, chills, shortness of breath, abdominal pain, chest pain, nausea/vomiting, or dysuria. PAST MEDICAL / SURGICAL / SOCIAL / FAMILY HISTORY      has a past medical history of Acute diastolic CHF (congestive heart failure) ([facility] [address] has a past surgical history that includes Mitral valve replacement (02/2007); transesophageal echocardiogram (10-18-19); Cardiac catheterization (10/19/2017); and Mitral valve replacement (N/A, 10/23/2017).       Social History     Socioeconomic History    Marital status:      Spouse name: Not on file    Number of children: Not on file    Years of education: Not on file    Highest education level: Not on file   Occupational History    Not on file   Social Needs    Financial resource strain: Not on file    Food insecurity Worry: Not on file     Inability: Not on file    Transportation needs     Medical: Not on file     Non-medical: Not on file   Tobacco Use    Smoking status: Never Smoker    Smokeless tobacco: Never Used   Substance and Sexual Activity    Alcohol use: No    Drug use: No    Sexual activity: Not on file   Lifestyle    Physical activity     Days per week: Not on file     Minutes per session: Not on file    Stress: Not on file   Relationships    Social connections     Talks on phone: Not on file     Gets together: Not on file     Attends Mu-ism service: Not on file     Active member of club or organization: Not on file     Attends meetings of clubs or organizations: Not on file     Relationship status: Not on file    Intimate partner violence     Fear of current or ex partner: Not on file     Emotionally abused: Not on file     Physically abused: Not on file     Forced sexual activity: Not on file   Other Topics Concern    Not on file   Social History Narrative    Not on file       History reviewed. No pertinent family history. Allergies:  Patient has no known allergies. Home Medications:  Prior to Admission medications    Medication Sig Start Date End Date Taking? Authorizing Provider   ezetimibe (ZETIA) 10 MG tablet Take 10 mg by mouth daily   Yes Historical Provider, MD   warfarin (COUMADIN) 5 MG tablet Take one tablet by mouth once daily as directed by [facility]. 5/14/20  Yes Estelita Torres MD   tamsulosin (FLOMAX) 0.4 MG capsule Take 0.4 mg by mouth daily   Yes Historical Provider, MD   furosemide (LASIX) 40 MG tablet Take 40 mg by mouth daily as needed Indications: swelling in legs, at least once weekly    Yes Historical Provider, MD   Testosterone 30 MG/ACT SOLN Place 60 mg onto the skin daily.  Indications: 2 pumps daily = 60mg Patient alternating 30mg and 60mg every other day   Yes Historical Provider, MD   midodrine (PROAMATINE) 2.5 MG tablet Take 2.5 mg by mouth 3 times daily as needed (SBP < 110)    Yes Historical Provider, MD   citalopram (CELEXA) 10 MG tablet Take 5 mg by mouth daily   Yes Historical Provider, MD   sotalol (BETAPACE) 80 MG tablet Take 80 mg by mouth every morning   Yes Historical Provider, MD   sotalol (BETAPACE) 80 MG tablet Take 40 mg by mouth nightly   Yes Historical Provider, MD   aspirin 81 MG tablet Take 1 tablet by mouth daily 12/18/17  Yes PIETOR Welch - CNP   Omega-3 Fatty Acids (FISH OIL) 1000 MG CAPS Take 1,000 mg by mouth daily    Historical Provider, MD   Multiple Vitamins-Minerals (THERAPEUTIC MULTIVITAMIN-MINERALS) tablet Take 1 tablet by mouth daily    Historical Provider, MD       REVIEW OF SYSTEMS    (2-9 systems for level 4, 10 or more for level 5)      Review of Systems   Constitutional: Negative for chills, fatigue and fever. HENT: Negative for congestion and sore throat. Eyes: Negative for photophobia and visual disturbance. Respiratory: Negative for cough and shortness of breath. Cardiovascular: Negative for chest pain and palpitations. Gastrointestinal: Negative for abdominal pain, nausea and vomiting. Genitourinary: Negative for dysuria and hematuria. Musculoskeletal: Positive for neck pain. Negative for arthralgias, myalgias and neck stiffness. Skin: Negative for rash and wound. Neurological: Positive for headaches. Negative for weakness and numbness. PHYSICAL EXAM   (up to 7 for level 4, 8 or more for level 5)      INITIAL VITALS:   BP (!) 178/106   Pulse 74   Temp 98.6 °F (37 °C) (Temporal)   Resp 20   Ht 5' 9\" (1.753 m)   Wt 238 lb (108 kg)   SpO2 96%   BMI 35.15 kg/m²     Physical Exam  Vitals signs and nursing note reviewed. Constitutional:       Appearance: Normal appearance. He is normal weight. HENT:      Head: Normocephalic.       Right Ear: External ear normal.      Left Ear: External ear normal.      Ears:      Comments: B/l TM negative for hemotympanum     Nose: Nose normal.      Comments: B/l negative nasal hematomas     Mouth/Throat:      Mouth: Mucous membranes are moist.   Eyes:      General: No scleral icterus. Extraocular Movements: Extraocular movements intact. Conjunctiva/sclera: Conjunctivae normal.      Pupils: Pupils are equal, round, and reactive to light. Neck:      Musculoskeletal: Normal range of motion. No neck rigidity. Comments: C-Collar in place  Cardiovascular:      Rate and Rhythm: Normal rate. Pulses: Normal pulses. Heart sounds: No murmur. No friction rub. No gallop. Pulmonary:      Effort: Pulmonary effort is normal. No respiratory distress. Breath sounds: Normal breath sounds. Abdominal:      General: Abdomen is flat. There is no distension. Tenderness: There is no abdominal tenderness. There is no guarding or rebound. Musculoskeletal: Normal range of motion. General: No swelling or tenderness. Skin:     General: Skin is warm and dry. Capillary Refill: Capillary refill takes less than 2 seconds. Neurological:      General: No focal deficit present. Mental Status: He is alert and oriented to person, place, and time. Mental status is at baseline. Comments: 5/5 strength in all extremities. Sensation equal/light touch and pain all extremities. Cranial nerves 2 through 12 examination.          DIFFERENTIAL  DIAGNOSIS     PLAN (LABS / IMAGING / EKG):  Orders Placed This Encounter   Procedures    XR CHEST PORTABLE    CT HEAD WO CONTRAST    CT CERVICAL SPINE WO CONTRAST    Protime-INR    APTT    CBC WITH AUTO DIFFERENTIAL    BASIC METABOLIC PANEL    Troponin    MAGNESIUM    EKG 12 Lead       MEDICATIONS ORDERED:  Orders Placed This Encounter   Medications    orphenadrine (NORFLEX) injection 60 mg       DDX: Intracranial hemorrhage, arrhythmia, super therapeutic INR, electrolyte abnormality    DIAGNOSTIC RESULTS / EMERGENCY DEPARTMENT COURSE / MDM   LAB RESULTS:  No results found for this visit on 02/20/21. IMPRESSION: 14-year-old male presents for fall on Coumadin. Patient appears to be no acute distress and nontoxic-appearing. Patient's initial vitals are stable not concerning. Patient is clear lung sounds bilaterally. RRR with normal S1-S2 heart sounds. Abdomen soft nontender with no guarding/rigidity/rebound tenderness. 5/5 strength in bilateral extremities with no focal neuro deficits. Questionable midline tenderness of the cervical spine and currently in c-collar. Concern for above differential diagnosis. Will order CT head, cervical spine, CBC, BMP, troponin, EKG, and chest x-ray. Possible discharge home. RADIOLOGY:  Ct Head Wo Contrast    Result Date: 2/20/2021  EXAMINATION: CT OF THE HEAD WITHOUT CONTRAST  2/20/2021 3:01 am TECHNIQUE: CT of the head was performed without the administration of intravenous contrast. Dose modulation, iterative reconstruction, and/or weight based adjustment of the mA/kV was utilized to reduce the radiation dose to as low as reasonably achievable. COMPARISON: None. HISTORY: ORDERING SYSTEM PROVIDED HISTORY: fall on coumadin TECHNOLOGIST PROVIDED HISTORY: fall on coumadin Decision Support Exception->Emergency Medical Condition (MA) Reason for Exam: pain, Acuity: Unknown Type of Exam: Unknown Mechanism of Injury: fall FINDINGS: BRAIN/VENTRICLES: There is a small area of hypodensity in the posterior lateral left frontal lobe suggestive of an age-indeterminate insult and could represent a focus of recent ischemia. Recommend further evaluation with MRI of the brain. There is no acute intracranial hemorrhage, mass effect or midline shift. No abnormal extra-axial fluid collection. The gray-white differentiation is maintained without evidence of an acute infarct. There is no evidence of hydrocephalus. ORBITS: The visualized portion of the orbits demonstrate no acute abnormality.  SINUSES: The visualized paranasal sinuses and mastoid air cells demonstrate no acute abnormality. SOFT TISSUES/SKULL:  No acute abnormality of the visualized skull or soft tissues. Small area of hypodensity in the posterior lateral left frontal lobe suggestive of an age-indeterminate insult and could represent a focus of recent ischemia. Recommend further evaluation with MRI of the brain. Ct Cervical Spine Wo Contrast    Result Date: 2/20/2021  EXAMINATION: CT OF THE CERVICAL SPINE WITHOUT CONTRAST 2/20/2021 3:01 am TECHNIQUE: CT of the cervical spine was performed without the administration of intravenous contrast. Multiplanar reformatted images are provided for review. Dose modulation, iterative reconstruction, and/or weight based adjustment of the mA/kV was utilized to reduce the radiation dose to as low as reasonably achievable. COMPARISON: None. HISTORY: ORDERING SYSTEM PROVIDED HISTORY: fall on coumadin, midline cervical pain TECHNOLOGIST PROVIDED HISTORY: fall on coumadin, midline cervical pain Decision Support Exception->Emergency Medical Condition (MA) Reason for Exam: midline cerv pain Acuity: Unknown Type of Exam: Unknown Mechanism of Injury: fall FINDINGS: BONES/ALIGNMENT: There is no acute fracture or traumatic malalignment. DEGENERATIVE CHANGES: Multilevel mild-to-moderate spondylosis is noted without associated central canal stenosis/compromise identified. C5/C6 mild bilateral, C6/C7 moderate left neural foraminal stenosis secondary to encroachment by disc osteophyte complex is visualized. SOFT TISSUES: There is no prevertebral soft tissue swelling. 1. No evidence of acute abnormality of the cervical spine. 2. Multilevel mild-to-moderate cervical spine spondylosis without associated central canal stenosis/compromise. 3. C5/C6 mild bilateral, C6/C7 moderate left neural foraminal stenosis secondary to encroachment by disc osteophyte complex. Xr Chest Portable    1. Worsening mild pulmonary vascular congestion.        EKG  EKG Interpretation    Interpreted by emergency department physician    Rhythm: paced  Rate: normal  Axis: normal  Ectopy: none  Conduction: nonspecific interventricular conduction block  ST Segments: no acute change  T Waves: normal  Q Waves: nonspecific    Clinical Impression: no acute changes    Casandra Powers      All EKG's are interpreted by the Emergency Department Physician who either signs or Co-signs this chart in the absence of a cardiologist.    EMERGENCY DEPARTMENT COURSE:  ED Course as of Feb 20 0712   Sat Feb 20, 2021   0140 CBC is non-concerning    [CS]   0157 Within therapeutic levels for mechanical mitral valve   INR: 3.0 [CS]   0158 Troponin, High Sensitivity: 10 [CS]   0207 Magnesium is unremarkable. BMP is nonconcerning.    [CS]   9549 CT cervical spine reveals degeneration including multilevel spondylosis along with C5/C6 mild bilateral narrowing the neural foramina along with C6-C7 moderate stenosis. CT head reveals a low-density left lateral frontal hypodensity is age-indeterminate. Will consult neurology. [CS]   0794 Talk to neurology who recommends at this time the patient not having an active stroke but does recommend an MRI brain before discharge. They recommend observation if needed. [CS]   0532 Patient was agreeable with admission to the observation unit to have an MRI. Will admit to the observation unit for monitoring and obtaining MRI. In addition clear patient C-spine as he was having no neurological symptoms, patient had no midline cervical tenderness on palpation examination. Patient was able to take his neck through full range of motion without reproducing any midline pain or numbness tingling. Based on degenerative changes on CT cervical spine no acute trauma. [CS]      ED Course User Index  [CS] Casandra Powers, DO         PROCEDURES:  none    CONSULTS:  None    CRITICAL CARE:  Please see attending note    FINAL IMPRESSION      1. Fall, initial encounter    2.  Abnormal CT of brain          DISPOSITION / PLAN     DISPOSITION        PATIENT REFERRED TO:  No follow-up provider specified.     DISCHARGE MEDICATIONS:  New Prescriptions    No medications on file       Priti Steward DO  Emergency Medicine Resident    (Please note that portions of thisnote were completed with a voice recognition program.  Efforts were made to edit the dictations but occasionally words are mis-transcribed.)       Priti Steward DO  Resident  02/20/21 14 6Th Lexii Menendez DO  Resident  02/20/21 0105

## 2021-02-20 NOTE — ED NOTES
ED to inpatient nurses report    Chief Complaint   Patient presents with   [address] Head Injury      Present to ED from work  LOC: alert and orientated to name, place, date  Vital signs   Vitals:    02/20/21 0401 02/20/21 0431 02/20/21 0501 02/20/21 0522   BP: (!) 134/93 (!) 147/92 (!) 150/98    Pulse: 60 66 60 61   Resp: 12 17 14 16   Temp:       TempSrc:       SpO2: 94% 96% 94% 98%   Weight:       Height:          Oxygen Baseline Room air     Current needs required - MRI   LDAs:   Peripheral IV 02/20/21 Left Wrist (Active)   Site Assessment Clean;Dry; Intact 02/20/21 0120     Mobility: Independent  Pending ED orders: MRI  Present condition: A&Ox4, respirations even and unlabored, ambulatory, independent  Code Status: FULL  Consults:  [x]  Hospitalist  Completed  [] yes [] no  []  Medicine  Completed  [] yes [] No  []  Cardiology  Completed  [] yes [] No  []  GI   Completed  [] yes [] No  [x]  Neurology  Completed  [] yes [] No  []  Nephrology Completed  [] yes [] No  []  Vascular  Completed  [] yes [] No   []  Surgery  Completed  [] yes [] No   []  Urology  Completed  [] yes [] No   []  Plastics  Completed  [] yes [] No   []  ENT  Completed  [] yes [] No   []  Other Completed  [] yes [] No  Pertinent event(s) Claufaustina Gaitan outside of work, has paperwork for [employer] comp with him  Pertinent event(s)   Electronically signed by Blue Bautista RN on 2/20/2021 at 7:13 AM       Blue Bautista RN  02/20/21 3315

## 2021-02-20 NOTE — ED NOTES
Dr. Ivonne Vee @ bedside to update patient on imaging and plan of care. Pt does not wish to take Ketoralac @ this time. Pt denies any other complaints. Will continue to monitor.      Blue Bautista RN  02/20/21 9782

## 2021-02-20 NOTE — ED NOTES
Report called to Jeremiah Zambrano RN. All questions answered.       Jameson Delgadillo RN  02/20/21 3912

## 2021-02-20 NOTE — PROGRESS NOTES
CDU Daily Progress Note  Attending Physician       Pt Name: Arlene Garcia  MRN: [id number]  [place] 1960  Date of evaluation: 2/20/21    I performed a history and physical examination of the patient and discussed management with the resident. I reviewed the residents note and agree with the documented findings and plan of care. Any areas of disagreement are noted on the chart. I was personally present for the key portions of any procedures. I have documented in the chart those procedures where I was not present during the key portions. I have reviewed the emergency nurses triage note. I agree with the chief complaint, past medical history, past surgical history, allergies, medications, social and family history as documented unless otherwise noted below. Documentation of the HPI, Physical Exam and Medical Decision Making performed by medical students or scribes is based on my personal performance of the HPI, PE and MDM. For Physician Assistant/ Nurse Practitioner cases/documentation I have personally evaluated this patient and have completed at least one if not all key elements of the E/M (history, physical exam, and MDM). Additional findings are as noted. The Family History, Social History and Review of Systems are unchanged from the previous day. No significant events overnight. Slipped on the ice and fell backwards hitting back of head hard on the ice. Pretty sure he did not loose consciousness. Had headache yesterday after the fall about a 4/10, now headache is 2/10 in posterior head. Denies any N/V, visual or speech disturbances, denies paresthesias or weakness or incontinence. PMHx: mechanical mitral valve w INR 3.0. CT head hypodensity L frontal posterior lateral lobe. Neurology consulted.  Will get MRI brain today    Chavo Alvarado MD  Attending Physician  Critical Decision Unit

## 2021-02-20 NOTE — ED NOTES
Pt ambulates to the bathroom with a steady gait, no apparent distress.       Dori Shah RN  02/20/21 1018

## 2021-02-20 NOTE — ED NOTES
Dr Cota Sick @ bedside to clear C-Spine and remove C-collar. Pt given lunch box and something to drink. No other complaints at this time.      Aman Hough RN  02/20/21 0856

## 2021-02-20 NOTE — CONSULTS
[facility] Neurology   IN-PATIENT SERVICE      NEUROLOGY CONSULT  NOTE            Date:   2/20/2021  Patient name:  Wong Ny  Date of admission:  2/20/2021  YOB: 1960      Chief Complaint:     Chief Complaint   Patient presents with    Fall    Head Injury     Reason for Consult:      Age indeterminate hypodensity in posterior lobe, fell today on coumadin    History of Present Illness: The patient is a 63 y.o. male with history of paroxysmal A. Fib, sinus node dysfunction with PPM, MVR status post Maze procedure and left atrial appendage closure on Coumadin who presents to the ED after sustaining a fall around 1 AM.  Patient states that he was leaving work and walking to his car and he slipped and hit the back of his head. He denies LOC. Patient got himself up and made his way over to the ER. Patient denies any nausea, vomiting, or fatigue. Does endorse mild headache, on a scale of 0-10 (rates the pain at a 4). Patient reports concerned that due to being on Coumadin he wanted to be evaluated therefore presented to the ED. INR is 3.0 (goal of 2.5-3.5 due to mechanical mitral valve). Follows with Coumadin clinic. CT head -did not show a bleed however small area of hypodensity noted in the posterior lateral left frontal lobe suggestive of an age-indeterminate insult and could represent a focus of recent ischemia.   Recommend further evaluation with MRI of the brain as per radiologist.    In my evaluation in the ED, patient is AAO x3 and has a nonfocal exam.  NIHSS 0    Past Medical History:     Past Medical History:   Diagnosis Date    Acute diastolic CHF (congestive heart failure) ([facility] [address] 10/17/2017        Past Surgical History:     Past Surgical History:   Procedure Laterality Date    CARDIAC CATHETERIZATION  10/19/2017    right and left heart cath Dr. Thomas Rosado  02/2007    MITRAL VALVE REPLACEMENT N/A 10/23/2017    REDO STERNOTOMY, MITRAL VALVE REPLACEMENT 27MM MEDTRONIC OPEN PIVOT MECHANICAL, ON PUMP, SWAN, ALEENA performed by Miguel Angel Ferraro MD at [address] [place] [address] TRANSESOPHAGEAL ECHOCARDIOGRAM  10-18-19        Medications Prior to Admission:     Prior to Admission medications    Medication Sig Start Date End Date Taking? Authorizing Provider   ezetimibe (ZETIA) 10 MG tablet Take 10 mg by mouth daily   Yes Historical Provider, MD   warfarin (COUMADIN) 5 MG tablet Take one tablet by mouth once daily as directed by [facility]. 5/14/20  Yes Yisel Chu MD   tamsulosin (FLOMAX) 0.4 MG capsule Take 0.4 mg by mouth daily   Yes Historical Provider, MD   furosemide (LASIX) 40 MG tablet Take 40 mg by mouth daily as needed Indications: swelling in legs, at least once weekly    Yes Historical Provider, MD   Testosterone 30 MG/ACT SOLN Place 60 mg onto the skin daily. Indications: 2 pumps daily = 60mg Patient alternating 30mg and 60mg every other day   Yes Historical Provider, MD   midodrine (PROAMATINE) 2.5 MG tablet Take 2.5 mg by mouth 3 times daily as needed (SBP < 110)    Yes Historical Provider, MD   citalopram (CELEXA) 10 MG tablet Take 5 mg by mouth daily   Yes Historical Provider, MD   sotalol (BETAPACE) 80 MG tablet Take 80 mg by mouth every morning   Yes Historical Provider, MD   sotalol (BETAPACE) 80 MG tablet Take 40 mg by mouth nightly   Yes Historical Provider, MD   aspirin 81 MG tablet Take 1 tablet by mouth daily 12/18/17  Yes PIETRO Adam - CNP   Omega-3 Fatty Acids (FISH OIL) 1000 MG CAPS Take 1,000 mg by mouth daily    Historical Provider, MD   Multiple Vitamins-Minerals (THERAPEUTIC MULTIVITAMIN-MINERALS) tablet Take 1 tablet by mouth daily    Historical Provider, MD        Allergies:     Patient has no known allergies. Social History:     Tobacco:    reports that he has never smoked. He has never used smokeless tobacco.  Alcohol:      reports no history of alcohol use.   Drug Use:  reports no history of drug use. Family History:     History reviewed. No pertinent family history. Review of Systems:     As per HPI    Constitutional Negative for fever and chills   HEENT Negative for ear discharge, ear pain, nosebleed   Eyes Negative for photophobia, pain and discharge   Respiratory Negative for hemoptysis and sputum   Cardiovascular Negative for orthopnea, claudication and PND   Gastrointestinal Negative for abdominal pain, diarrhea, blood in stool   Musculoskeletal Negative for joint pain, negative for myalgia   Skin Negative for rash or itching   hematology Negative for ecchymosis, anemia   Psychiatric Negative for suicidal ideation, anxiety, depression, hallucinations       Physical Exam:   BP (!) 178/106   Pulse 60   Temp 98.6 °F (37 °C) (Temporal)   Resp 12   Ht 5' 9\" (1.753 m)   Wt 238 lb (108 kg)   SpO2 95%   BMI 35.15 kg/m²   Temp (24hrs), Av.6 °F (37 °C), Min:98.6 °F (37 °C), Max:98.6 °F (37 °C)        General examination:      General Appearance:  alert, well appearing, and in no acute distress  HEENT: Normocephalic, atraumatic, moist mucus membranes  Neck: supple, no carotid bruits, (-) nuchal rigidity  Lungs:  Respirations unlabored, chest wall no deformity, BS normal  Cardiovascular: normal rate, regular rhythm  Abdomen: Soft, nontender, nondistended, normal bowel sounds  Skin: No gross lesions, rashes, bruising or bleeding on exposed skin area  Extremities:  peripheral pulses palpable, no cyanosis, clubbing or edema  Psych: normal affect      Neurological examination:      Mental status   Alert and oriented x 3; following all commands;   speech is fluent, no dysarthria, aphasia.       Cranial nerves   II - visual fields intact to confrontation; pupils reactive  III, IV, VI - extraocular muscles intact; no MARIO; no nystagmus; no ptosis   V - normal facial sensation                                                               VII - normal facial symmetry VIII - intact hearing                                                                             IX, X - symmetrical palate elevation                                               XI - symmetrical shoulder shrug                                                       XII - midline tongue without atrophy or fasciculation     Motor function  Strength:   5/5 RUE, 5/5 RLE  5/5 LUE, 5/5  LLE  Normal bulk and tone. Sensory function Intact to touch, pin, vibration, proprioception throughout     Cerebellar Intact finger-nose-finger testing. No dysdiadochokinesia present. No tremors                        Reflex function 2/4 symmetric throughout . Downgoing plantar response bilaterally. (-)Barreto's sign bilaterally      Gait                  Not tested       NIH Stroke Scale Total (if not done complete detailed one below):    1a.  Level of consciousness:  0 - alert; keenly responsive  1b. Level of consciousness questions:  0 - answers both questions correctly  1c. Level of consciousness questions:  0 - performs both tasks correctly  2. Best Gaze:  0 - normal  3. Visual:  0 - no visual loss  4. Facial Palsy:  0 - normal symmetric movement  5a. Motor left arm:  0 - no drift, limb holds 90 (or 45) degrees for full 10 seconds  5b. Motor right arm:  0 - no drift, limb holds 90 (or 45) degrees for full 10 seconds  6a. Motor left le - no drift; leg holds 30 degree position for full 5 seconds  6b. Motor right le - no drift; leg holds 30 degree position for full 5 seconds  7. Limb Ataxia:  0 - absent  8. Sensory:  0 - normal; no sensory loss  9. Best Language:  0 - no aphasia, normal  10. Dysarthria:  0 - normal  11.   Extinction and Inattention:  0 - no abnormality    NIHSS: 0    Diagnostics:      Laboratory Testing:  CBC:   Recent Labs     21  013   WBC 5.8   HGB 15.2   *     BMP:    Recent Labs     21      K 3.8      CO2 26 BUN 22   CREATININE 0.82   GLUCOSE 100*         Lab Results   Component Value Date    CHOL 193 10/17/2017    LDLCHOLESTEROL 130 10/17/2017    HDL 37 (L) 10/17/2017    TRIG 131 10/17/2017    ALT 46 (H) 01/05/2021    AST 33 01/05/2021    TSH 2.00 10/17/2017    INR 3.0 02/20/2021       No results found for: PHENYTOIN, PHENYTOIN, VALPROATE, CBMZ      Imaging/Diagnostics:  Ct Head Wo Contrast    Result Date: 2/20/2021  EXAMINATION: CT OF THE HEAD WITHOUT CONTRAST  2/20/2021 3:01 am TECHNIQUE: CT of the head was performed without the administration of intravenous contrast. Dose modulation, iterative reconstruction, and/or weight based adjustment of the mA/kV was utilized to reduce the radiation dose to as low as reasonably achievable. COMPARISON: None. HISTORY: ORDERING SYSTEM PROVIDED HISTORY: fall on coumadin TECHNOLOGIST PROVIDED HISTORY: fall on coumadin Decision Support Exception->Emergency Medical Condition (MA) Reason for Exam: pain, Acuity: Unknown Type of Exam: Unknown Mechanism of Injury: fall FINDINGS: BRAIN/VENTRICLES: There is a small area of hypodensity in the posterior lateral left frontal lobe suggestive of an age-indeterminate insult and could represent a focus of recent ischemia. Recommend further evaluation with MRI of the brain. There is no acute intracranial hemorrhage, mass effect or midline shift. No abnormal extra-axial fluid collection. The gray-white differentiation is maintained without evidence of an acute infarct. There is no evidence of hydrocephalus. ORBITS: The visualized portion of the orbits demonstrate no acute abnormality. SINUSES: The visualized paranasal sinuses and mastoid air cells demonstrate no acute abnormality. SOFT TISSUES/SKULL:  No acute abnormality of the visualized skull or soft tissues. Small area of hypodensity in the posterior lateral left frontal lobe suggestive of an age-indeterminate insult and could represent a focus of recent ischemia.  Recommend further

## 2021-02-20 NOTE — DISCHARGE SUMMARY
directed by [address] [place] [address].                  Diet:  DIET GENERAL; , Advance as tolerated     Activity:  As tolerated    Consultants: IP CONSULT TO NEUROLOGY    Procedures:  Not indicated     Diagnostic Test:   Results for orders placed or performed during the hospital encounter of 02/20/21   Protime-INR   Result Value Ref Range    Protime 29.1 (H) 9.1 - 12.3 sec    INR 3.0    APTT   Result Value Ref Range    PTT 38.2 (H) 20.5 - 30.5 sec   CBC WITH AUTO DIFFERENTIAL   Result Value Ref Range    WBC 5.8 3.5 - 11.3 k/uL    RBC 5.31 4.21 - 5.77 m/uL    Hemoglobin 15.2 13.0 - 17.0 g/dL    Hematocrit 46.8 40.7 - 50.3 %    MCV 88.1 82.6 - 102.9 fL    MCH 28.6 25.2 - 33.5 pg    MCHC 32.5 28.4 - 34.8 g/dL    RDW 13.1 11.8 - 14.4 %    Platelets 230 (L) 296 - 453 k/uL    MPV 12.4 8.1 - 13.5 fL    NRBC Automated 0.0 0.0 per 100 WBC    Differential Type NOT REPORTED     Seg Neutrophils 56 36 - 65 %    Lymphocytes 30 24 - 43 %    Monocytes 9 3 - 12 %    Eosinophils % 3 1 - 4 %    Basophils 1 0 - 2 %    Immature Granulocytes 1 (H) 0 %    Segs Absolute 3.29 1.50 - 8.10 k/uL    Absolute Lymph # 1.76 1.10 - 3.70 k/uL    Absolute Mono # 0.54 0.10 - 1.20 k/uL    Absolute Eos # 0.18 0.00 - 0.44 k/uL    Basophils Absolute 0.03 0.00 - 0.20 k/uL    Absolute Immature Granulocyte 0.03 0.00 - 0.30 k/uL    WBC Morphology NOT REPORTED     RBC Morphology NOT REPORTED     Platelet Estimate NOT REPORTED    BASIC METABOLIC PANEL   Result Value Ref Range    Glucose 100 (H) 70 - 99 mg/dL    BUN 22 8 - 23 mg/dL    CREATININE 0.82 0.70 - 1.20 mg/dL    Bun/Cre Ratio NOT REPORTED 9 - 20    Calcium 9.1 8.6 - 10.4 mg/dL    Sodium 137 135 - 144 mmol/L    Potassium 3.8 3.7 - 5.3 mmol/L    Chloride 103 98 - 107 mmol/L    CO2 26 20 - 31 mmol/L    Anion Gap 8 (L) 9 - 17 mmol/L    GFR Non-African American >60 >60 mL/min    GFR African American >60 >60 mL/min    GFR Comment          GFR Staging NOT REPORTED    Troponin   Result Value Ref Range Troponin, High Sensitivity 10 0 - 22 ng/L    Troponin T NOT REPORTED <0.03 ng/mL    Troponin Interp NOT REPORTED    MAGNESIUM   Result Value Ref Range    Magnesium 1.9 1.6 - 2.6 mg/dL   EKG 12 Lead   Result Value Ref Range    Ventricular Rate 65 BPM    Atrial Rate 65 BPM    P-R Interval 234 ms    QRS Duration 176 ms    Q-T Interval 486 ms    QTc Calculation (Bazett) 505 ms    P Axis 21 degrees    R Axis 43 degrees    T Axis 42 degrees   EKG 12 Lead   Result Value Ref Range    Ventricular Rate 67 BPM    Atrial Rate 67 BPM    P-R Interval 224 ms    QRS Duration 172 ms    Q-T Interval 476 ms    QTc Calculation (Bazett) 502 ms    P Axis 38 degrees    R Axis 24 degrees    T Axis -34 degrees     Ct Head Wo Contrast    Result Date: 2/20/2021  EXAMINATION: CT OF THE HEAD WITHOUT CONTRAST  2/20/2021 3:01 am TECHNIQUE: CT of the head was performed without the administration of intravenous contrast. Dose modulation, iterative reconstruction, and/or weight based adjustment of the mA/kV was utilized to reduce the radiation dose to as low as reasonably achievable. COMPARISON: None. HISTORY: ORDERING SYSTEM PROVIDED HISTORY: fall on coumadin TECHNOLOGIST PROVIDED HISTORY: fall on coumadin Decision Support Exception->Emergency Medical Condition (MA) Reason for Exam: pain, Acuity: Unknown Type of Exam: Unknown Mechanism of Injury: fall FINDINGS: BRAIN/VENTRICLES: There is a small area of hypodensity in the posterior lateral left frontal lobe suggestive of an age-indeterminate insult and could represent a focus of recent ischemia. Recommend further evaluation with MRI of the brain. There is no acute intracranial hemorrhage, mass effect or midline shift. No abnormal extra-axial fluid collection. The gray-white differentiation is maintained without evidence of an acute infarct. There is no evidence of hydrocephalus. ORBITS: The visualized portion of the orbits demonstrate no acute abnormality.  SINUSES: The visualized paranasal CHEST 2/20/2021 1:57 am COMPARISON: 12/14/2017 HISTORY: ORDERING SYSTEM PROVIDED HISTORY: fall on coumadin TECHNOLOGIST PROVIDED HISTORY: fall on coumadin Reason for Exam: fall on coumadin Acuity: Unknown Type of Exam: Unknown FINDINGS: The cardiac silhouette is enlarged. Median sternotomy wires are noted with a metallic heart valve and left-sided cardiac device. Mild pulmonary vascular congestion with low lung volumes. No focal lung infiltrate. Visualized osseous structures are unremarkable. 1. Worsening mild pulmonary vascular congestion. Physical Exam:    General appearance - NAD, AOx 3   Lungs -CTAB, no R/R/R  Heart - RRR, no M/R/G  Abdomen - Soft, NT/ND  Neurological: Patient is alert and oriented to person, place, time. GCS 15.  5/5 strength bilateral upper and lower extremities. No sensory deficit. Cranial nerves II-XII grossly intact. No visual field deficits. Intact finger-nose testing. Intact heel-to-shin testing. Patient is able to ambulate with normal gait. Extremities - Cap refil <2 sec in all ext., no edema  Skin -warm, dry      Hospital Course:  Clinical course has improved, labs and imaging reviewed. Maria Elena Clark originally presented to the hospital on 2/20/2021  1:07 AM.  With complaint of headache after a fall from standing height while on Coumadin. Patient had CT head and CT cervical spine performed which was negative for acute intracranial traumatic abnormality. However there was an area concerning for recent ischemia seen on head CT and patient was admitted to observation department for neurology consultation as well as follow-up brain MRI. He was admitted and labs and imaging were followed daily. Imaging results as above. She does have a pacemaker in place and information was faxed to cardiologist for clearance for patient to undergo brain MRI.   Throughout the day MRI technicians have been unable to reach the cardiologist.  Patient was told he may have to wait another day in the hospital to undergo brain MRI. Patient was requesting discharge and outpatient follow-up and outpatient MRI. Patient again had benign neurological exam, I paged neurology to ask if they were agreeable with the plan of discharge and outpatient brain MRI. They stated patient was okay for discharge and outpatient MRI. He is medically stable to be discharged. Patient was instructed to return to hospital if he should display worsening neurological symptoms such as slurring of speech, weakness, numbness/tingling, abnormal gait, or other worrisome signs or symptoms. Patient verbalized understanding and was discharged home. Disposition: Home    Patient stated that they will not drive themselves home from the hospital if they have gotten pain killers/ narcotics earlier that day and that they will arrange for transportation on their own or work with the  for a ride. Patient counseled NOT to drive while under the influence of narcotics/ pain killers. Condition: Good    Patient stable and ready for discharge home. I have discussed plan of care with patient and they are in understanding. They were instructed to read discharge paperwork. All of their questions and concerns were addressed. Time Spent: 0 day      --  Lorie Dubon DO  Emergency Medicine Resident Physician    This dictation was generated by voice recognition computer software. Although all attempts are made to edit the dictation for accuracy, there may be errors in the transcription that are not intended.

## 2021-02-20 NOTE — PROGRESS NOTES
Patient's pacemaker information was faxed this morning to by MRI to cardiologist for clearance for brain MRI to further characterize finding seen on CT head. As of 2:30 PM we had still not received cardiac clearance for MRI. Patient asked if he could an MRI performed as an outpatient and was requesting discharge. Patient's neurology exam was again benign with no focal deficits, and had 5/5 strength bilateral upper and lower extremity, no cranial nerve deficits, intact finger-nose testing, intact heel-to-shin testing, ambulating without difficulty. I paged neurology to ask if they were agreeable to discharge and outpatient brain MRI neurology was agreeable to discharge and outpatient brain MRI. I instructed the patient to return to the hospital if he should develop new neurological symptoms such as numbness/weakness/tingling, difficulty with speech, or other symptoms that should worry him. Patient verbalized understanding. Will discharge patient home with order for outpatient brain MRI.     Electronically signed by Lorie Dubon DO on 2/20/2021 at 2:51 PM

## 2021-02-20 NOTE — H&P
[facility]  CDU / OBSERVATION eNCOUnter  Resident Note     Pt Name: Gabriela Ortez  MRN: [id number]  [place] 1960  Date of evaluation: 2/20/21  Patient's PCP is :  Gavino Muro MD    CHIEF COMPLAINT       Chief Complaint   Patient presents with    Fall    Head Injury         HISTORY OF PRESENT ILLNESS    Gabriela Ortez is a 63 y.o. male who presented to the emergency department after a fall from standing height. Patient states he was walking and especially on some ice when he fell backwards and struck the back of his head. He does not believe he lost consciousness. Patient does take Coumadin for mechanical valve. INR was found to be 3.0 in the emergency department. CT head and CT cervical spine were negative for acute traumatic injury however the CT head did reveal area of possible recent ischemia and recommended MRI follow-up. Neurology was consulted and patient was admitted to the observation department to undergo MRI brain. Patient does admit to a headache this morning however rates a 2/10, and states it is improved from yesterday. Denies numbness/weakness/tingling.     Location/Symptom: Headache  Timing/Onset: 1 day  Provocation: Brought on by fall  Quality: Dull  Radiation: None  Severity: 2/10  Timing/Duration: Improving  Modifying Factors: None    REVIEW OF SYSTEMS       General ROS - No fevers, No malaise   Ophthalmic ROS - No discharge, No changes in vision  ENT ROS -  No sore throat, No rhinorrhea,   Respiratory ROS - no shortness of breath, no cough, no  wheezing  Cardiovascular ROS - No chest pain, no dyspnea on exertion  Gastrointestinal ROS - No abdominal pain, no nausea or vomiting, no change in bowel habits, no black or bloody stools  Genito-Urinary ROS - No dysuria, trouble voiding, or hematuria  Musculoskeletal ROS - No myalgias, No arthalgias  Neurological ROS -positive for headache, no dizziness/lightheadedness, No focal weakness, no loss of ENT: moist mucous membranes, uvula midline   NECK: supple, symmetric   BACK: symmetric   LUNGS: clear to auscultation bilaterally   CARDIOVASCULAR: regular rate and rhythm, no murmurs, rubs or gallops   ABDOMEN: soft, non-tender, non-distended with normal active bowel sounds   NEUROLOGIC:   Alert and oriented to person, place, time. GCS 15.  5/5 strength bilateral upper and lower extremity. Cranial nerves II-XII intact. Finger-nose testing intact. Patient able to ambulate normally. MUSCULOSKELETAL: no clubbing, cyanosis or edema   SKIN: no rash or wounds         DIAGNOSTIC RESULTS     EKG: All EKG's are interpreted by the Observation Physician who either signs or Co-signs this chart in the absence of a cardiologist.    EKG Interpretation    Interpreted by observation physician    Rhythm: 1 degree AV block, paced  Rate: normal  Axis: normal  Ectopy: premature ventricular contractions (unifocal) and premature ventricular contractions (infrequent)  Conduction: 1st degree AV block, atrially paced  ST Segments: no acute change  T Waves: no acute change  Q Waves: none    Clinical Impression: Atrial paced rhythm with occasional PVC    Destiny Bone, DO        RADIOLOGY:   I directly visualized the following  images and reviewed the radiologist interpretations:    Ct Head Wo Contrast    Result Date: 2/20/2021  EXAMINATION: CT OF THE HEAD WITHOUT CONTRAST  2/20/2021 3:01 am TECHNIQUE: CT of the head was performed without the administration of intravenous contrast. Dose modulation, iterative reconstruction, and/or weight based adjustment of the mA/kV was utilized to reduce the radiation dose to as low as reasonably achievable. COMPARISON: None.  HISTORY: ORDERING SYSTEM PROVIDED HISTORY: fall on coumadin TECHNOLOGIST PROVIDED HISTORY: fall on coumadin Decision Support Exception->Emergency Medical Condition (MA) Reason for Exam: pain, Acuity: Unknown Type of Exam: Unknown Mechanism of Injury: fall FINDINGS: BRAIN/VENTRICLES: There is a small area of hypodensity in the posterior lateral left frontal lobe suggestive of an age-indeterminate insult and could represent a focus of recent ischemia. Recommend further evaluation with MRI of the brain. There is no acute intracranial hemorrhage, mass effect or midline shift. No abnormal extra-axial fluid collection. The gray-white differentiation is maintained without evidence of an acute infarct. There is no evidence of hydrocephalus. ORBITS: The visualized portion of the orbits demonstrate no acute abnormality. SINUSES: The visualized paranasal sinuses and mastoid air cells demonstrate no acute abnormality. SOFT TISSUES/SKULL:  No acute abnormality of the visualized skull or soft tissues. Small area of hypodensity in the posterior lateral left frontal lobe suggestive of an age-indeterminate insult and could represent a focus of recent ischemia. Recommend further evaluation with MRI of the brain. Ct Cervical Spine Wo Contrast    Result Date: 2/20/2021  EXAMINATION: CT OF THE CERVICAL SPINE WITHOUT CONTRAST 2/20/2021 3:01 am TECHNIQUE: CT of the cervical spine was performed without the administration of intravenous contrast. Multiplanar reformatted images are provided for review. Dose modulation, iterative reconstruction, and/or weight based adjustment of the mA/kV was utilized to reduce the radiation dose to as low as reasonably achievable. COMPARISON: None. HISTORY: ORDERING SYSTEM PROVIDED HISTORY: fall on coumadin, midline cervical pain TECHNOLOGIST PROVIDED HISTORY: fall on coumadin, midline cervical pain Decision Support Exception->Emergency Medical Condition (MA) Reason for Exam: midline cerv pain Acuity: Unknown Type of Exam: Unknown Mechanism of Injury: fall FINDINGS: BONES/ALIGNMENT: There is no acute fracture or traumatic malalignment.  DEGENERATIVE CHANGES: Multilevel mild-to-moderate spondylosis is noted without associated central canal stenosis/compromise identified. C5/C6 mild bilateral, C6/C7 moderate left neural foraminal stenosis secondary to encroachment by disc osteophyte complex is visualized. SOFT TISSUES: There is no prevertebral soft tissue swelling. 1. No evidence of acute abnormality of the cervical spine. 2. Multilevel mild-to-moderate cervical spine spondylosis without associated central canal stenosis/compromise. 3. C5/C6 mild bilateral, C6/C7 moderate left neural foraminal stenosis secondary to encroachment by disc osteophyte complex. Xr Chest Portable    Result Date: 2/20/2021  EXAMINATION: ONE XRAY VIEW OF THE CHEST 2/20/2021 1:57 am COMPARISON: 12/14/2017 HISTORY: ORDERING SYSTEM PROVIDED HISTORY: fall on coumadin TECHNOLOGIST PROVIDED HISTORY: fall on coumadin Reason for Exam: fall on coumadin Acuity: Unknown Type of Exam: Unknown FINDINGS: The cardiac silhouette is enlarged. Median sternotomy wires are noted with a metallic heart valve and left-sided cardiac device. Mild pulmonary vascular congestion with low lung volumes. No focal lung infiltrate. Visualized osseous structures are unremarkable. 1. Worsening mild pulmonary vascular congestion. LABS:  I have reviewed and interpreted all available lab results.   Labs Reviewed   PROTIME-INR - Abnormal; Notable for the following components:       Result Value    Protime 29.1 (*)     All other components within normal limits   APTT - Abnormal; Notable for the following components:    PTT 38.2 (*)     All other components within normal limits   CBC WITH AUTO DIFFERENTIAL - Abnormal; Notable for the following components:    Platelets 028 (*)     Immature Granulocytes 1 (*)     All other components within normal limits   BASIC METABOLIC PANEL - Abnormal; Notable for the following components:    Glucose 100 (*)     Anion Gap 8 (*)     All other components within normal limits   TROPONIN   MAGNESIUM     CDU IMPRESSION / PLAN      Keren Tavares is a 63 y.o. male who originally presented due to fall from standing height while on Coumadin. CT head and cervical spine were negative for acute traumatic injury however CT head did reveal possible area of recent ischemia and recommended MRI. Patient was admitted to observation unit for neurology consult and MRI brain. Patient reports improvement in his headache. Neurological exam benign this morning. · Awaiting brain MRI  · Continue home medications and pain control  · Monitor vitals, labs, and imaging  · DISPO: pending consults and clinical improvement    CONSULTS:    IP CONSULT TO NEUROLOGY    PROCEDURES:  Not indicated       PATIENT REFERRED TO:    No follow-up provider specified. --  Debra Powers,    Emergency Medicine Resident     This dictation was generated by voice recognition computer software. Although all attempts are made to edit the dictation for accuracy, there may be errors in the transcription that are not intended.

## 2021-02-20 NOTE — ED NOTES
Pt resting comfortably on stretcher, no apparent distress. Will continue to monitor.       Rickie Romero RN  02/20/21 [id number]

## 2021-02-23 ENCOUNTER — TELEPHONE (OUTPATIENT)
Dept: PHARMACY | Age: 61
End: 2021-02-23

## 2021-02-23 DIAGNOSIS — Z95.2 H/O MITRAL VALVE REPLACEMENT WITH MECHANICAL VALVE: Primary | ICD-10-CM

## 2021-02-23 NOTE — TELEPHONE ENCOUNTER
Patient called to let us know he was seen at [facility] in ED on 2/20 after a fall where he hit his head. INR was 3.0 which is therapeutic (goal 2.5-3.5) per Epic. Patient work up was negative for bleed. Patient requesting to be rescheduled for next INR. Set for 3/23/21 and patient is to call to be seen sooner if needed.

## 2021-03-02 ENCOUNTER — APPOINTMENT (OUTPATIENT)
Dept: PHARMACY | Age: 61
End: 2021-03-02
Payer: COMMERCIAL

## 2021-03-02 ENCOUNTER — HOSPITAL ENCOUNTER (OUTPATIENT)
Age: 61
Discharge: HOME | End: 2021-03-02
Payer: COMMERCIAL

## 2021-03-02 LAB
ABSOLUTE EOS #: 0.15 K/UL (ref 0–0.44)
ABSOLUTE IMMATURE GRANULOCYTE: <0.03 K/UL (ref 0–0.3)
ABSOLUTE LYMPH #: 1.11 K/UL (ref 1.1–3.7)
ABSOLUTE MONO #: 0.41 K/UL (ref 0.1–1.2)
ALBUMIN SERPL-MCNC: 4.4 G/DL (ref 3.5–5.2)
ALBUMIN/GLOBULIN RATIO: 1.8 (ref 1–2.5)
ALP BLD-CCNC: 77 U/L (ref 40–129)
ALT SERPL-CCNC: 41 U/L (ref 5–41)
ANION GAP SERPL CALCULATED.3IONS-SCNC: 4 MMOL/L (ref 9–17)
AST SERPL-CCNC: 28 U/L
BASOPHILS # BLD: 1 % (ref 0–2)
BASOPHILS ABSOLUTE: 0.03 K/UL (ref 0–0.2)
BILIRUB SERPL-MCNC: 1.2 MG/DL (ref 0.3–1.2)
BUN BLDV-MCNC: 24 MG/DL (ref 8–23)
BUN/CREAT BLD: ABNORMAL (ref 9–20)
CALCIUM SERPL-MCNC: 9 MG/DL (ref 8.6–10.4)
CHLORIDE BLD-SCNC: 101 MMOL/L (ref 98–107)
CHOLESTEROL/HDL RATIO: 4.5
CHOLESTEROL: 181 MG/DL
CO2: 29 MMOL/L (ref 20–31)
CREAT SERPL-MCNC: 0.92 MG/DL (ref 0.7–1.2)
DIFFERENTIAL TYPE: ABNORMAL
EOSINOPHILS RELATIVE PERCENT: 3 % (ref 1–4)
GFR AFRICAN AMERICAN: >60 ML/MIN
GFR NON-AFRICAN AMERICAN: >60 ML/MIN
GFR SERPL CREATININE-BSD FRML MDRD: ABNORMAL ML/MIN/{1.73_M2}
GFR SERPL CREATININE-BSD FRML MDRD: ABNORMAL ML/MIN/{1.73_M2}
GLUCOSE BLD-MCNC: 103 MG/DL (ref 70–99)
HCT VFR BLD CALC: 48.3 % (ref 40.7–50.3)
HDLC SERPL-MCNC: 40 MG/DL
HEMOGLOBIN: 15.8 G/DL (ref 13–17)
IMMATURE GRANULOCYTES: 0 %
LDL CHOLESTEROL: 117 MG/DL (ref 0–130)
LYMPHOCYTES # BLD: 23 % (ref 24–43)
MAGNESIUM: 2.1 MG/DL (ref 1.6–2.6)
MCH RBC QN AUTO: 28.7 PG (ref 25.2–33.5)
MCHC RBC AUTO-ENTMCNC: 32.7 G/DL (ref 28.4–34.8)
MCV RBC AUTO: 87.8 FL (ref 82.6–102.9)
MONOCYTES # BLD: 9 % (ref 3–12)
NRBC AUTOMATED: 0 PER 100 WBC
PDW BLD-RTO: 13 % (ref 11.8–14.4)
PLATELET # BLD: 127 K/UL (ref 138–453)
PLATELET ESTIMATE: ABNORMAL
PMV BLD AUTO: 12.8 FL (ref 8.1–13.5)
POTASSIUM SERPL-SCNC: 4 MMOL/L (ref 3.7–5.3)
RBC # BLD: 5.5 M/UL (ref 4.21–5.77)
RBC # BLD: ABNORMAL 10*6/UL
SEG NEUTROPHILS: 64 % (ref 36–65)
SEGMENTED NEUTROPHILS ABSOLUTE COUNT: 3.09 K/UL (ref 1.5–8.1)
SODIUM BLD-SCNC: 134 MMOL/L (ref 135–144)
TOTAL PROTEIN: 6.9 G/DL (ref 6.4–8.3)
TRIGL SERPL-MCNC: 118 MG/DL
VLDLC SERPL CALC-MCNC: ABNORMAL MG/DL (ref 1–30)
WBC # BLD: 4.8 K/UL (ref 3.5–11.3)
WBC # BLD: ABNORMAL 10*3/UL

## 2021-03-02 PROCEDURE — 80061 LIPID PANEL: CPT

## 2021-03-02 PROCEDURE — 80053 COMPREHEN METABOLIC PANEL: CPT

## 2021-03-02 PROCEDURE — 85025 COMPLETE CBC W/AUTO DIFF WBC: CPT

## 2021-03-02 PROCEDURE — 36415 COLL VENOUS BLD VENIPUNCTURE: CPT

## 2021-03-02 PROCEDURE — 83735 ASSAY OF MAGNESIUM: CPT

## 2021-03-08 LAB
EKG ATRIAL RATE: 65 BPM
EKG ATRIAL RATE: 65 BPM
EKG ATRIAL RATE: 67 BPM
EKG P AXIS: 21 DEGREES
EKG P AXIS: 30 DEGREES
EKG P AXIS: 38 DEGREES
EKG P-R INTERVAL: 224 MS
EKG P-R INTERVAL: 228 MS
EKG P-R INTERVAL: 234 MS
EKG Q-T INTERVAL: 476 MS
EKG Q-T INTERVAL: 486 MS
EKG Q-T INTERVAL: 490 MS
EKG QRS DURATION: 172 MS
EKG QRS DURATION: 176 MS
EKG QRS DURATION: 176 MS
EKG QTC CALCULATION (BAZETT): 502 MS
EKG QTC CALCULATION (BAZETT): 505 MS
EKG QTC CALCULATION (BAZETT): 509 MS
EKG R AXIS: -17 DEGREES
EKG R AXIS: 24 DEGREES
EKG R AXIS: 43 DEGREES
EKG T AXIS: -34 DEGREES
EKG T AXIS: 22 DEGREES
EKG T AXIS: 42 DEGREES
EKG VENTRICULAR RATE: 65 BPM
EKG VENTRICULAR RATE: 65 BPM
EKG VENTRICULAR RATE: 67 BPM
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
## 2021-03-23 ENCOUNTER — HOSPITAL ENCOUNTER (OUTPATIENT)
Dept: PHARMACY | Age: 61
Setting detail: THERAPIES SERIES
Discharge: HOME | End: 2021-03-23
Payer: COMMERCIAL

## 2021-03-23 DIAGNOSIS — Z95.2 H/O MITRAL VALVE REPLACEMENT WITH MECHANICAL VALVE: ICD-10-CM

## 2021-03-23 LAB
INR BLD: 4.1
PROTIME: 48.9 SECONDS

## 2021-03-23 PROCEDURE — 85610 PROTHROMBIN TIME: CPT

## 2021-03-23 PROCEDURE — 99212 OFFICE O/P EST SF 10 MIN: CPT

## 2021-03-23 RX ORDER — ACETAMINOPHEN 500 MG
500 TABLET ORAL EVERY 6 HOURS PRN
COMMUNITY

## 2021-03-23 NOTE — PROGRESS NOTES
Patient seen in clinic for warfarin management due to mechanical mitral valve with an INR goal of 2.5-3.5. Estimated duration of therapy is indefinite. Patient states compliant all of the time with regimen. No bleeding or thromboembolic side effects noted. . Patient states he has hardly had any veggies over the last couple weeks. He normally has them regularly. He has switched from Aleve to tylenol to manage pain; however his pain level has been higher lately. He had a fall on 2/20 and went to ED. He had a CT scan that showed evidence of a possible stroke and has an appt with neurology coming up to discuss further. PT/INR done in office per protocol. INR is 4.1 which is supratherapeutic. Warfarin regimen will be continued at current dose of 2.5mg tues and 5mg all other days as patient would like to eat more veggies. Will retest in 2 weeks to ensure INR returns to goal range. Patient understands dosing directions and information discussed. Dosing schedule and follow up appointment given to patient. Progress note routed to referring physicians office. Discussed with patient the Pharmacist Collaborative Practice Agreement. Patient provided verbal and/or electronic (ex. [employer]) consent to participate in the collaborative practice agreement between the pharmacist and referred patient. This is in lieu of paper consent due to COVID-19 precautions and the use of remote/virtual visits.        CLINICAL PHARMACY CONSULT: MED RECONCILIATION/REVIEW ADDENDUM    For Pharmacy Admin Tracking Only    PHSO: No  Total # of Interventions Recommended: 0  - Maintenance Safety Lab Monitoring #: 1  Total Interventions Accepted: 0  Time Spent (min): [address], [address] [place] [address] c/o mild pain this am, but exam benign  +bm yesterday am  cont bowel regimen  bacid one tab po tid  levsin 0.125 po tid for spasms  serial abd exams  pain control  monitor for bms c/o mild pain this am, but exam benign, afebrile overnight, no leukocytosis  +bm yesterday am  cont bowel regimen  bacid one tab po tid  levsin 0.125 po tid for spasms  serial abd exams  pain control  monitor for bms

## 2021-04-01 ENCOUNTER — OFFICE VISIT (OUTPATIENT)
Dept: NEUROLOGY | Age: 61
End: 2021-04-01
Payer: COMMERCIAL

## 2021-04-01 VITALS
DIASTOLIC BLOOD PRESSURE: 93 MMHG | SYSTOLIC BLOOD PRESSURE: 134 MMHG | BODY MASS INDEX: 34.96 KG/M2 | TEMPERATURE: 96.8 F | HEART RATE: 65 BPM | HEIGHT: 69 IN | WEIGHT: 236 LBS

## 2021-04-01 DIAGNOSIS — R20.8 DECREASED SENSATION OF FOOT: ICD-10-CM

## 2021-04-01 DIAGNOSIS — R90.89 ABNORMAL CT OF BRAIN: Primary | ICD-10-CM

## 2021-04-01 PROCEDURE — G8427 DOCREV CUR MEDS BY ELIG CLIN: HCPCS | Performed by: NURSE PRACTITIONER

## 2021-04-01 PROCEDURE — 99214 OFFICE O/P EST MOD 30 MIN: CPT | Performed by: NURSE PRACTITIONER

## 2021-04-01 PROCEDURE — 3017F COLORECTAL CA SCREEN DOC REV: CPT | Performed by: NURSE PRACTITIONER

## 2021-04-01 PROCEDURE — G8417 CALC BMI ABV UP PARAM F/U: HCPCS | Performed by: NURSE PRACTITIONER

## 2021-04-01 PROCEDURE — 1036F TOBACCO NON-USER: CPT | Performed by: NURSE PRACTITIONER

## 2021-04-01 SDOH — HEALTH STABILITY: MENTAL HEALTH: HOW OFTEN DO YOU HAVE A DRINK CONTAINING ALCOHOL?: NOT ASKED

## 2021-04-01 SDOH — HEALTH STABILITY: MENTAL HEALTH: HOW MANY STANDARD DRINKS CONTAINING ALCOHOL DO YOU HAVE ON A TYPICAL DAY?: NOT ASKED

## 2021-04-01 NOTE — PROGRESS NOTES
[facility]            [address]          [place], [address] [facility]          Dept: [phone]          Dept Fax: [phone]        MD Kayley Loera MD Ahmed B. Harrel Liverpool, MD Arvilla Lang, MD Melissa Lei, MD Larayne Reynolds, CNP            4/1/2021      HISTORY OF PRESENT ILLNESS:       I had the pleasure of seeing Rachell Allen, who returns for continuing neurologic care. The patient is a 63year old man who was seen last by Dr. Concha Beaver at [facility]'s ED on February 20, 2021 for treatment of an acute headache due to a fall from standing height after slipping on ice. There was no loss of consciousness or lightheadedness. Patient had CT head and CT cervical spine performed which were negative for acute intracranial traumatic abnormality. However, there was an area concerning for recent ischemia seen on the head CT. The patient consulted neurology, who agreed an MRI of the brain was necessary. This has not been completed by the patient. The patient is currently treated with aspirin 81 mg daily, Coumadin 5 mg daily, and Zetia 10 mg daily. He reports that he has previously been on Lipitor, which caused negative side effects. Today, the patient reports that because of his pacemaker and metal mitral valve, he has had difficulty completing the MRI. He underwent heart surgery in 2007 which preceded many neurologic problems. He had difficulty focusing his eyes and would see flashing lights and white dots. Additionally, he began to lose hearing at that time. These symptoms lasted for a few years. He did see Dr. Scotty Austin and completed an MRI of the brain and pituitary gland at [facility] in 2008. He also attended vestibular rehabilitation. The patient reports that the results were unremarkable at the time.  The patient does admit to intermittent dizziness since the surgery, but is otherwise back to normal. He denies any diplopia. Additionally, the patient has had surgical release of plantar fasciitis in 2010 and 2011 and a pinched nerve, which caused numbness of three toes. He did have an EMG later which confirmed the presence of a pinched nerve, but not neuropathy, as reported by the patient. He denies diabetes, but admits to a family history. The patient does see Dr. Gumaro Prasad in endocrinology, who reportedly completes annual blood work. He denies tingling in his feet, but does admit to occasional numbness. He states that he does have difficulty with balance in the dark and while switching from the gas to brake while driving. Testing reviewed:    CT head wo contrast 2/20/21 - Small area of hypodensity in the posterior lateral left frontal lobe suggestive of an age-indeterminate insult and could represent a focus of recent ischemia. Recommend further evaluation with MRI of the brain. CT cervical spine wo contrast 2/20/21 - 1. No evidence of acute abnormality of the cervical spine. 2. Multilevel mild-to-moderate cervical spine spondylosis without associated central canal stenosis/compromise. 3. C5/C6 mild bilateral, C6/C7 moderate left neural foraminal stenosis secondary to encroachment by disc osteophyte complex.       PAST MEDICAL HISTORY:         Diagnosis Date    Acute diastolic CHF (congestive heart failure) ([address] 10/17/2017    Atrial fibrillation ([address]     Neurocardiogenic syncope         PAST SURGICAL HISTORY:         Procedure Laterality Date    CARDIAC CATHETERIZATION  10/19/2017    right and left heart cath Dr. Jaya Li  02/2007    MITRAL VALVE REPLACEMENT N/A 10/23/2017    REDO STERNOTOMY, MITRAL VALVE REPLACEMENT 27MM MEDTRONIC OPEN PIVOT MECHANICAL, ON PUMP, SWAN, ALEENA performed by Bubba Chairez MD at [address] [place] [address] TRANSESOPHAGEAL ECHOCARDIOGRAM  10-18-19        SOCIAL HISTORY:     Social History Socioeconomic History    Marital status:      Spouse name: Not on file    Number of children: Not on file    Years of education: Not on file    Highest education level: Not on file   Occupational History    Not on file   Social Needs    Financial resource strain: Not on file    Food insecurity     Worry: Not on file     Inability: Not on file    Transportation needs     Medical: Not on file     Non-medical: Not on file   Tobacco Use    Smoking status: Never Smoker    Smokeless tobacco: Never Used   Substance and Sexual Activity    Alcohol use: Yes     Comment: very occasionally    Drug use: No    Sexual activity: Not on file   Lifestyle    Physical activity     Days per week: Not on file     Minutes per session: Not on file    Stress: Not on file   Relationships    Social connections     Talks on phone: Not on file     Gets together: Not on file     Attends Mu-ism service: Not on file     Active member of club or organization: Not on file     Attends meetings of clubs or organizations: Not on file     Relationship status: Not on file    Intimate partner violence     Fear of current or ex partner: Not on file     Emotionally abused: Not on file     Physically abused: Not on file     Forced sexual activity: Not on file   Other Topics Concern    Not on file   Social History Narrative    Not on file       CURRENT MEDICATIONS:     Current Outpatient Medications   Medication Sig Dispense Refill    acetaminophen (TYLENOL) 500 MG tablet Take 500 mg by mouth every 6 hours as needed for Pain      ezetimibe (ZETIA) 10 MG tablet Take 10 mg by mouth daily      warfarin (COUMADIN) 5 MG tablet Take one tablet by mouth once daily as directed by [facility].  90 tablet 3    tamsulosin (FLOMAX) 0.4 MG capsule Take 0.4 mg by mouth daily      Omega-3 Fatty Acids (FISH OIL) 1000 MG CAPS Take 1,000 mg by mouth daily      furosemide (LASIX) 40 MG tablet Take 40 mg by mouth daily as needed Indications: swelling in legs, at least once weekly       Testosterone 30 MG/ACT SOLN Place 60 mg onto the skin daily. Indications: 2 pumps daily = 60mg Patient alternating 30mg and 60mg every other day      midodrine (PROAMATINE) 2.5 MG tablet Take 2.5 mg by mouth 3 times daily as needed (SBP < 110)       citalopram (CELEXA) 10 MG tablet Take 5 mg by mouth daily      sotalol (BETAPACE) 80 MG tablet Take 80 mg by mouth every morning      sotalol (BETAPACE) 80 MG tablet Take 40 mg by mouth nightly      Multiple Vitamins-Minerals (THERAPEUTIC MULTIVITAMIN-MINERALS) tablet Take 1 tablet by mouth daily      aspirin 81 MG tablet Take 1 tablet by mouth daily 30 tablet 3     No current facility-administered medications for this visit. ALLERGIES:   No Known Allergies                              REVIEW OF SYSTEMS        All items selected indicate a positive finding. Those items not selected are negative.   Constitutional [] Weight loss/gain   [] Fatigue  [] Fever/Chills   HEENT [] Hearing Loss  [] Visual Disturbance  [] Tinnitus  [] Eye pain   Respiratory [] Shortness of Breath  [] Cough  [] Snoring   Cardiovascular [] Chest Pain  [] Palpitations  [] Lightheaded   GI [] Constipation  [] Diarrhea  [] Swallowing change  [] Nausea/vomiting    [] Urinary Frequency  [] Urinary Urgency   Musculoskeletal [] Neck pain  [] Back pain  [] Muscle pain  [] Restless legs   Dermatologic [] Skin changes   Neurologic [] Memory loss/confusion  [] Seizures  [] Trouble walking or imbalance  [x] Dizziness  [] Sleep disturbance  [] Weakness  [] Numbness  [] Tremors  [] Speech Difficulty  [x] Headaches  [] Light Sensitivity  [] Sound Sensitivity   Endocrinology []Excessive thirst  []Excessive hunger   Psychiatric [] Anxiety/Depression  [] Hallucination   Allergy/immunology []Hives/environmental allergies   Hematologic/lymph [] Abnormal bleeding  [] Abnormal bruising         PHYSICAL EXAMINATION:       Vitals:

## 2021-04-07 ENCOUNTER — APPOINTMENT (OUTPATIENT)
Dept: PHARMACY | Age: 61
End: 2021-04-07
Payer: COMMERCIAL

## 2021-04-07 ENCOUNTER — HOSPITAL ENCOUNTER (OUTPATIENT)
Age: 61
Discharge: HOME | End: 2021-04-07
Payer: COMMERCIAL

## 2021-04-07 ENCOUNTER — HOSPITAL ENCOUNTER (OUTPATIENT)
Dept: PHARMACY | Age: 61
Setting detail: THERAPIES SERIES
Discharge: HOME | End: 2021-04-07
Payer: COMMERCIAL

## 2021-04-07 DIAGNOSIS — R90.89 ABNORMAL CT OF BRAIN: ICD-10-CM

## 2021-04-07 DIAGNOSIS — Z95.2 H/O MITRAL VALVE REPLACEMENT WITH MECHANICAL VALVE: ICD-10-CM

## 2021-04-07 LAB
ESTIMATED AVERAGE GLUCOSE: 111 MG/DL
FOLATE: 12.8 NG/ML
HBA1C MFR BLD: 5.5 % (ref 4–6)
INR BLD: 2.2
PROTIME: 26.4 SECONDS
TSH SERPL DL<=0.05 MIU/L-ACNC: 1.67 MIU/L (ref 0.3–5)
VITAMIN B-12: 1227 PG/ML (ref 232–1245)

## 2021-04-07 PROCEDURE — 99212 OFFICE O/P EST SF 10 MIN: CPT

## 2021-04-07 PROCEDURE — 36415 COLL VENOUS BLD VENIPUNCTURE: CPT

## 2021-04-07 PROCEDURE — 83036 HEMOGLOBIN GLYCOSYLATED A1C: CPT

## 2021-04-07 PROCEDURE — 82607 VITAMIN B-12: CPT

## 2021-04-07 PROCEDURE — 82746 ASSAY OF FOLIC ACID SERUM: CPT

## 2021-04-07 PROCEDURE — 84443 ASSAY THYROID STIM HORMONE: CPT

## 2021-04-07 PROCEDURE — 85610 PROTHROMBIN TIME: CPT

## 2021-04-07 PROCEDURE — 86038 ANTINUCLEAR ANTIBODIES: CPT

## 2021-04-07 NOTE — PROGRESS NOTES
Patient seen in clinic for warfarin management due to mechanical mitral valve with an INR goal of 2.5-3.5. Estimated duration of therapy is indefinite. Patient states compliant all of the time with regimen. No bleeding or thromboembolic side effects noted. No significant med changes. Patient states he has eaten more brussel sprouts than usual over the last 2 weeks. No significant recent illness or disease state changes. PT/INR done in office per protocol. INR is 2.2 which is just below goal.     Warfarin regimen will be continued at current dose of 2.5mg Tuesday and 5mg all other days. Patient plans to cut back on brussel sprouts a bit. Will retest in 3 weeks. Patient understands dosing directions and information discussed. Dosing schedule and follow up appointment given to patient. Progress note routed to referring physicians office. Discussed with patient the Pharmacist Collaborative Practice Agreement. Patient provided verbal and/or electronic (ex. [employer]) consent to participate in the collaborative practice agreement between the pharmacist and referred patient. This is in lieu of paper consent due to COVID-19 precautions and the use of remote/virtual visits.        CLINICAL PHARMACY CONSULT: MED RECONCILIATION/REVIEW ADDENDUM    For Pharmacy Admin Tracking Only    PHSO: No  Total # of Interventions Recommended: 0  - Maintenance Safety Lab Monitoring #: 1  Total Interventions Accepted: 0  Time Spent (min): [address] PharmD

## 2021-04-08 LAB — ANTI-NUCLEAR ANTIBODY (ANA): NEGATIVE

## 2021-04-22 ENCOUNTER — TELEPHONE (OUTPATIENT)
Dept: NEUROLOGY | Age: 61
End: 2021-04-22

## 2021-04-22 DIAGNOSIS — Z87.821 HISTORY OF FOREIGN BODY IN EYE: ICD-10-CM

## 2021-04-22 DIAGNOSIS — R90.89 ABNORMAL CT OF BRAIN: Primary | ICD-10-CM

## 2021-04-22 NOTE — TELEPHONE ENCOUNTER
[facility] scheduling called the office this afternoon stating that Mr. Albin Keita was scheduled for his MRI. However there is a history of foreign body in the eye and they are requesting orders for orbit x-rays. This was discussed with [facility] and she is agreeable with this.

## 2021-04-23 ENCOUNTER — HOSPITAL ENCOUNTER (OUTPATIENT)
Age: 61
Discharge: HOME | End: 2021-04-25
Payer: COMMERCIAL

## 2021-04-23 ENCOUNTER — HOSPITAL ENCOUNTER (OUTPATIENT)
Dept: GENERAL RADIOLOGY | Age: 61
Discharge: HOME | End: 2021-04-25
Payer: COMMERCIAL

## 2021-04-23 DIAGNOSIS — R90.89 ABNORMAL CT OF BRAIN: ICD-10-CM

## 2021-04-23 DIAGNOSIS — Z87.821 HISTORY OF FOREIGN BODY IN EYE: ICD-10-CM

## 2021-04-23 PROCEDURE — 70030 X-RAY EYE FOR FOREIGN BODY: CPT

## 2021-04-27 ENCOUNTER — HOSPITAL ENCOUNTER (OUTPATIENT)
Dept: PHARMACY | Age: 61
Setting detail: THERAPIES SERIES
Discharge: HOME | End: 2021-04-27
Payer: COMMERCIAL

## 2021-04-27 DIAGNOSIS — Z95.2 H/O MITRAL VALVE REPLACEMENT WITH MECHANICAL VALVE: ICD-10-CM

## 2021-04-27 LAB
INR BLD: 3.2
PROTIME: 38 SECONDS

## 2021-04-27 PROCEDURE — 85610 PROTHROMBIN TIME: CPT

## 2021-04-27 PROCEDURE — 99211 OFF/OP EST MAY X REQ PHY/QHP: CPT

## 2021-05-04 ENCOUNTER — TELEPHONE (OUTPATIENT)
Dept: PHARMACY | Age: 61
End: 2021-05-04

## 2021-05-04 DIAGNOSIS — Z95.2 H/O MITRAL VALVE REPLACEMENT WITH MECHANICAL VALVE: Primary | ICD-10-CM

## 2021-05-04 RX ORDER — WARFARIN SODIUM 5 MG/1
5 TABLET ORAL DAILY
Qty: 90 TABLET | Refills: 3 | Status: SHIPPED | OUTPATIENT
Start: 2021-05-04 | End: 2022-01-26 | Stop reason: SDUPTHER

## 2021-05-04 NOTE — TELEPHONE ENCOUNTER
Sent in warfarin refill to [address] per patient request  For Pharmacy Admin Tracking Only     Intervention Detail: Refill(s) Provided   Total # of Interventions Recommended: 1   Total # of Interventions Accepted: 1   Time Spent (min): 5

## 2021-05-05 ENCOUNTER — TELEPHONE (OUTPATIENT)
Dept: NEUROLOGY | Age: 61
End: 2021-05-05

## 2021-05-05 NOTE — TELEPHONE ENCOUNTER
Aidan Espinosa is scheduled for his MRI on 5/7/2021. He called to get the results of his orbit x-ray to be sure that he can have the MRI done. I let him know that the report said there is no evidence of metallic foreign body with in the orbits. He voiced understanding.

## 2021-05-07 ENCOUNTER — HOSPITAL ENCOUNTER (OUTPATIENT)
Dept: MRI IMAGING | Age: 61
Discharge: HOME | End: 2021-05-09
Payer: COMMERCIAL

## 2021-05-07 DIAGNOSIS — R90.89 ABNORMAL CT OF BRAIN: ICD-10-CM

## 2021-05-07 PROCEDURE — 70551 MRI BRAIN STEM W/O DYE: CPT

## 2021-05-25 ENCOUNTER — HOSPITAL ENCOUNTER (OUTPATIENT)
Dept: PHARMACY | Age: 61
Setting detail: THERAPIES SERIES
Discharge: HOME | End: 2021-05-25
Payer: COMMERCIAL

## 2021-05-25 DIAGNOSIS — Z95.2 H/O MITRAL VALVE REPLACEMENT WITH MECHANICAL VALVE: Primary | ICD-10-CM

## 2021-05-25 LAB
INR BLD: 3.5
PROTIME: 41.7 SECONDS

## 2021-05-25 PROCEDURE — 99212 OFFICE O/P EST SF 10 MIN: CPT

## 2021-05-25 PROCEDURE — 85610 PROTHROMBIN TIME: CPT

## 2021-06-14 ENCOUNTER — HOSPITAL ENCOUNTER (OUTPATIENT)
Dept: PHARMACY | Age: 61
Setting detail: THERAPIES SERIES
Discharge: HOME | End: 2021-06-14
Payer: COMMERCIAL

## 2021-06-14 DIAGNOSIS — Z95.2 H/O MITRAL VALVE REPLACEMENT WITH MECHANICAL VALVE: Primary | ICD-10-CM

## 2021-06-14 LAB
INR BLD: 2.9
PROTIME: 34.7 SECONDS

## 2021-06-14 PROCEDURE — 99211 OFF/OP EST MAY X REQ PHY/QHP: CPT

## 2021-06-14 PROCEDURE — 85610 PROTHROMBIN TIME: CPT

## 2021-06-22 ENCOUNTER — HOSPITAL ENCOUNTER (OUTPATIENT)
Dept: NEUROLOGY | Age: 61
Discharge: HOME | End: 2021-06-22
Payer: COMMERCIAL

## 2021-06-22 PROCEDURE — 95886 MUSC TEST DONE W/N TEST COMP: CPT | Performed by: PHYSICAL MEDICINE & REHABILITATION

## 2021-06-22 PROCEDURE — 95909 NRV CNDJ TST 5-6 STUDIES: CPT | Performed by: PHYSICAL MEDICINE & REHABILITATION

## 2021-06-28 ENCOUNTER — TELEPHONE (OUTPATIENT)
Dept: NEUROLOGY | Age: 61
End: 2021-06-28

## 2021-06-28 NOTE — TELEPHONE ENCOUNTER
Taryn Carlson called back today about his EMG results. He is asking if it showed pinched nerve should he have an xray or some image to assess that further?

## 2021-07-05 ENCOUNTER — HOSPITAL ENCOUNTER (OUTPATIENT)
Dept: PHARMACY | Age: 61
Setting detail: THERAPIES SERIES
Discharge: HOME | End: 2021-07-05
Payer: COMMERCIAL

## 2021-07-05 DIAGNOSIS — Z95.2 H/O MITRAL VALVE REPLACEMENT WITH MECHANICAL VALVE: Primary | ICD-10-CM

## 2021-07-05 LAB
INR BLD: 3.7
PROTIME: 44.4 SECONDS

## 2021-07-05 PROCEDURE — 85610 PROTHROMBIN TIME: CPT

## 2021-07-05 PROCEDURE — 99211 OFF/OP EST MAY X REQ PHY/QHP: CPT

## 2021-07-05 NOTE — PROGRESS NOTES
Patient seen in clinic for warfarin management due to mechanical mitral valve with an INR goal of 2.5-3.5. Estimated duration of therapy is indefinite. Patient states compliant all of the time with regimen. No bleeding or thromboembolic side effects noted. No significant dietary changes. Patient states he took 110mg ALEVE Thursday and Friday, and 220mg ALEVE Saturday which is more than usual for him. Patient having issue with right leg and has follow up with neurology July 22nd. PT/INR done in office per protocol. INR is 3.7 which is just above goal likely from recent [address] Warfarin regimen will be continued at current dose of 2.5mg Tues/Thurs and 5mg all other days. Will retest in 4 weeks. Patient understands dosing directions and information discussed. Dosing schedule and follow up appointment given to patient. Progress note routed to referring physicians office. Discussed with patient the Pharmacist Collaborative Practice Agreement. Patient provided verbal and/or electronic (ex. [employer]) consent to participate in the collaborative practice agreement between the pharmacist and referred patient. This is in lieu of paper consent due to COVID-19 precautions and the use of remote/virtual visits.        For Pharmacy Admin Tracking Only     Intervention Detail:    Total # of Interventions Recommended: 0   Total # of Interventions Accepted: 0   Time Spent (min): 15

## 2021-07-06 ENCOUNTER — HOSPITAL ENCOUNTER (OUTPATIENT)
Age: 61
Discharge: HOME | End: 2021-07-06
Payer: COMMERCIAL

## 2021-07-06 ENCOUNTER — TELEPHONE (OUTPATIENT)
Dept: NEUROLOGY | Age: 61
End: 2021-07-06

## 2021-07-06 LAB
ALBUMIN SERPL-MCNC: 4.4 G/DL (ref 3.5–5.2)
ALBUMIN/GLOBULIN RATIO: 1.8 (ref 1–2.5)
ALP BLD-CCNC: 71 U/L (ref 40–129)
ALT SERPL-CCNC: 43 U/L (ref 5–41)
AST SERPL-CCNC: 30 U/L
BILIRUB SERPL-MCNC: 1.03 MG/DL (ref 0.3–1.2)
BILIRUBIN DIRECT: 0.15 MG/DL
BILIRUBIN, INDIRECT: 0.88 MG/DL (ref 0–1)
GLOBULIN: ABNORMAL G/DL (ref 1.5–3.8)
HCT VFR BLD CALC: 48.2 % (ref 40.7–50.3)
HEMOGLOBIN: 15.3 G/DL (ref 13–17)
MCH RBC QN AUTO: 28.3 PG (ref 25.2–33.5)
MCHC RBC AUTO-ENTMCNC: 31.7 G/DL (ref 28.4–34.8)
MCV RBC AUTO: 89.3 FL (ref 82.6–102.9)
NRBC AUTOMATED: 0 PER 100 WBC
PDW BLD-RTO: 13.5 % (ref 11.8–14.4)
PLATELET # BLD: 141 K/UL (ref 138–453)
PMV BLD AUTO: 12.5 FL (ref 8.1–13.5)
PROSTATE SPECIFIC ANTIGEN: 1.4 UG/L
RBC # BLD: 5.4 M/UL (ref 4.21–5.77)
TOTAL PROTEIN: 6.9 G/DL (ref 6.4–8.3)
WBC # BLD: 5.5 K/UL (ref 3.5–11.3)

## 2021-07-06 PROCEDURE — 36415 COLL VENOUS BLD VENIPUNCTURE: CPT

## 2021-07-06 PROCEDURE — 85027 COMPLETE CBC AUTOMATED: CPT

## 2021-07-06 PROCEDURE — 80076 HEPATIC FUNCTION PANEL: CPT

## 2021-07-06 PROCEDURE — 84153 ASSAY OF PSA TOTAL: CPT

## 2021-07-06 NOTE — TELEPHONE ENCOUNTER
Pt called in stating that since he had the EMG done on 6/22 he has been having a knot in his right leg. He also said that the muscles are very tight and feels like he ran a marathon. He said that it will radiate down to his foot as well. He is sure that it is from where they put the needle in during the EMG. He is wondering what he can do? Please advise, thanks.

## 2021-07-07 NOTE — TELEPHONE ENCOUNTER
Spoke with Vane Salcedo. He said that he has some swelling in the right leg too. Also some abnormal sensations. He spoke with [facility] where the EMG was done and also PCP and they both told him to call our office. He is using Tylenol and Aleve. He said it does make it hard for him to walk and drive. for example lifting his leg/foot up off of the pedal causes pain. There is a knot five or six inches below his knee in the front. He has been icing it, and elevating it and tries to stay off of it. He said even when he sits in  a chair the back of his leg between his hip and down the back of his leg to his foot. He is on Coumadin and had some bruising but that is clearing.   He just wonders if this just needs more time to heal.

## 2021-07-07 NOTE — TELEPHONE ENCOUNTER
I spoke with Becca Shankar. He is agreeable to having just an xray of his low back. What type of lumbar xray do you want?

## 2021-07-08 ENCOUNTER — OFFICE VISIT (OUTPATIENT)
Dept: NEUROLOGY | Age: 61
End: 2021-07-08
Payer: COMMERCIAL

## 2021-07-08 VITALS
DIASTOLIC BLOOD PRESSURE: 82 MMHG | HEART RATE: 86 BPM | TEMPERATURE: 97.2 F | WEIGHT: 231 LBS | BODY MASS INDEX: 34.21 KG/M2 | HEIGHT: 69 IN | SYSTOLIC BLOOD PRESSURE: 148 MMHG

## 2021-07-08 DIAGNOSIS — R22.41 LOCALIZED SWELLING OF RIGHT LOWER LEG: ICD-10-CM

## 2021-07-08 DIAGNOSIS — M54.16 LUMBAR RADICULOPATHY: Primary | ICD-10-CM

## 2021-07-08 DIAGNOSIS — I68.0 CEREBRAL AMYLOID ANGIOPATHY (CODE): ICD-10-CM

## 2021-07-08 DIAGNOSIS — I67.9 CEREBRAL VASCULAR DISEASE: ICD-10-CM

## 2021-07-08 PROCEDURE — G8417 CALC BMI ABV UP PARAM F/U: HCPCS | Performed by: NURSE PRACTITIONER

## 2021-07-08 PROCEDURE — 99214 OFFICE O/P EST MOD 30 MIN: CPT | Performed by: NURSE PRACTITIONER

## 2021-07-08 PROCEDURE — G8427 DOCREV CUR MEDS BY ELIG CLIN: HCPCS | Performed by: NURSE PRACTITIONER

## 2021-07-08 PROCEDURE — 3017F COLORECTAL CA SCREEN DOC REV: CPT | Performed by: NURSE PRACTITIONER

## 2021-07-08 PROCEDURE — 1036F TOBACCO NON-USER: CPT | Performed by: NURSE PRACTITIONER

## 2021-07-08 NOTE — PROGRESS NOTES
[facility]            [address]          [facility], [address] [facility]          Dept: [phone]          Dept Fax: [phone]        MD Michaela Suarez MD Parks Andrews, MD Georgeann Beady, AUDREY            7/8/2021      HISTORY OF PRESENT ILLNESS:       I had the pleasure of seeing Abe Fothergill, who returns for continuing neurologic care. The patient was seen last on April 1, 2021 for treatment of a fall causing an acute headache and a possible neuropathy. The patient complained at his last visit of a recent fall which had caused an acute headache. He did have a CT of his head completed which was concerning for an area of ischemia. An MRI of his brain with and without contrast was ordered at his last visit which was concerning for a small area of ischemia in the left frontal lobe. It also showed areas of blooming artifact both supratentorially and infratentorially which was concerning for possible amyloid angiopathy versus hypertensive microangiopathy    The patient did have a possible neuropathy at his last visit and completed EMG/NCV studies which revealed a bilateral multi-level radiculopathy affecting at least L2-L3 to S1 level. He did completed a vitamin B12 level, hemoglobin A1C, NICOLÁS, and TSH all of which were normal.     The patient reports that following the EMG he did notice a lump in his right leg, he is on coumadin. He notes that it has began to resolve and was pretty intense at the beginning. He noticed swelling of his right leg as well. Upon observation, there is a slight increase size of his right lower calf. He notes that he has been having numbness in his legs. He notes that he does have pain and numbness that begins in his lower back and radiate into his bilateral legs. He notes that he does have an MRI compatible pacemaker.      Testing reviewed:    EMG Bilateral lower extremities 6/22/2021  Assessment:  1. A chronic bilateral multi-level radiculopathy affecting at least the L2-L3 to S1 level and appears to be greatest in the L4-L5 level  2. There is currently no electrophysiologic evidence of a significant plexopathy, myopathy or peripheral neuropathy noted at this time. MRI Brain WO Contrast 5/7/2021  Impression   No acute intracranial abnormality.       Small remote area of ischemia in the left frontal lobe.       Scattered foci of supratentorial and infratentorial blooming artifact on   susceptibility weighted imaging that are nonspecific and differential   considerations include hypertensive microangiopathy and amyloid angiopathy. Labs Completed 4/7/2021    Hemoglobin A1C 5.5  4.0 - 6.0 %   Estimated Avg Glucose 111  mg/dL     NICOLÁS NEGATIVE  NEGATIVE     TSH 1.67  0.30 - 5.00 mIU/L     Vitamin B-12 1227  232 - 1245 pg/mL   Folate 12.8  >4.8 ng/mL               PAST MEDICAL HISTORY:         Diagnosis Date    Acute diastolic CHF (congestive heart failure) ([address] 10/17/2017    Atrial fibrillation ([address]     Neurocardiogenic syncope         PAST SURGICAL HISTORY:         Procedure Laterality Date    CARDIAC CATHETERIZATION  10/19/2017    right and left heart cath Dr. Torres Pi  10/27/2017    [employer] PACEMAKER ADVISA SURESCAN MODEL#[id number] LEAD 3533-41, 5714-22 MRI CONDITIONAL NORMAL MODE/ REP/FORM/RN    MITRAL VALVE REPLACEMENT  02/2007    MITRAL VALVE REPLACEMENT N/A 10/23/2017    REDO STERNOTOMY, MITRAL VALVE REPLACEMENT 27MM [employer] OPEN PIVOT MECHANICAL, ON PUMP, SWAN, ALEENA performed by Bird Chu MD at [address]  04/19/2007    XR [facility] D[address].  AND RECLEARED ON 4/21/2021    TRANSESOPHAGEAL ECHOCARDIOGRAM  10-18-19        SOCIAL HISTORY:     Social History     Socioeconomic History    Marital status:      Spouse name: Not on file    Number of children: Not on Indications: swelling in legs, at least once weekly       Testosterone 30 MG/ACT SOLN Place 60 mg onto the skin daily. Indications: 2 pumps daily = 60mg Patient alternating 30mg and 60mg every other day      midodrine (PROAMATINE) 2.5 MG tablet Take 2.5 mg by mouth 3 times daily as needed (SBP < 110)       citalopram (CELEXA) 10 MG tablet Take 5 mg by mouth daily      sotalol (BETAPACE) 80 MG tablet Take 80 mg by mouth every morning      sotalol (BETAPACE) 80 MG tablet Take 40 mg by mouth nightly      Multiple Vitamins-Minerals (THERAPEUTIC MULTIVITAMIN-MINERALS) tablet Take 1 tablet by mouth daily      aspirin 81 MG tablet Take 1 tablet by mouth daily 30 tablet 3     No current facility-administered medications for this visit. ALLERGIES:   No Known Allergies                              REVIEW OF SYSTEMS        All items selected indicate a positive finding. Those items not selected are negative.   Constitutional [] Weight loss/gain   [] Fatigue  [] Fever/Chills   HEENT [] Hearing Loss  [] Visual Disturbance  [] Tinnitus  [] Eye pain   Respiratory [] Shortness of Breath  [] Cough  [] Snoring   Cardiovascular [] Chest Pain  [] Palpitations  [] Lightheaded   GI [] Constipation  [] Diarrhea  [] Swallowing change  [] Nausea/vomiting    [] Urinary Frequency  [] Urinary Urgency   Musculoskeletal [] Neck pain  [x] Back pain  [] Muscle pain  [] Restless legs   Dermatologic [] Skin changes   Neurologic [] Memory loss/confusion  [] Seizures  [x] Trouble walking or imbalance  [] Dizziness  [] Sleep disturbance  [] Weakness  [x] Numbness  [] Tremors  [] Speech Difficulty  [] Headaches  [] Light Sensitivity  [] Sound Sensitivity   Endocrinology []Excessive thirst  []Excessive hunger   Psychiatric [] Anxiety/Depression  [] Hallucination   Allergy/immunology []Hives/environmental allergies   Hematologic/lymph [] Abnormal bleeding  [] Abnormal bruising         PHYSICAL EXAMINATION:       Vitals:    07/08/21 1142 BP: (!) 148/82   Pulse:    Temp:                                               .                                                                                                    General Appearance:  Alert, cooperative, no signs of distress, appears stated age   Head:  Normocephalic, no signs of trauma   Eyes:  Conjunctiva/corneas clear;  eyelids intact   Ears:  Normal external ear and canals   Nose: Nares normal, mucosa normal, no drainage    Throat: Lips and tongue normal; teeth normal;  gums normal   Neck: Supple, intact flexion, extension and rotation;   trachea midline;  no adenopathy;   thyroid: not enlarged;   no carotid pulse abnormality   Back:   Symmetric, no curvature, ROM adequate   Lungs:   Respirations unlabored   Heart:  Regular rate and rhythm           Extremities: Extremities normal, no cyanosis, no edema   Pulses: Symmetric over head and neck   Skin: Skin color, texture normal, no rashes, no lesions                                     NEUROLOGIC EXAMINATION    Neurologic Exam  Mental status    Alert and oriented x 3; intact memory with no confusion, speech or language problems; no hallucinations or delusions  Fund of information appropriate for level of education    Cranial nerves    II - visual fields intact to confrontation bilaterally  III, IV, VI  extra-ocular muscles full: no pupillary defect; no MARIO, no nystagmus, no ptosis   V - normal facial sensation                                                               VII - normal facial symmetry                                                             VIII - intact hearing                                                                             IX, X - symmetrical palate                                                                  XI - symmetrical shoulder shrug                                                       XII - tongue midline without atrophy or fasciculation      Motor function  Normal muscle bulk and tone; strength 5/5 on all 4 extremities, no pronator drift      Sensory function Intact to light touch, pinprick, vibration, proprioception on all 4 extremities. Diminished sensation in his bilateral lower extremities      Cerebellar Intact fine motor movement. No involuntary movements or tremors. No ataxia or dysmetria on finger to nose or heel to shin testing      Reflex function DTR 2+ on bilateral UE and LE, symmetric. Negative Babinski      Gait                   normal base and arm swing                  Medical Decision Making: In summary, your patient, Chelsie Nixon exhibits the following, with associated plan:    1. Recent fall which caused an acute headache. A CT of the head showed an area concerning for recent ischemia, consistent with a previous stroke of unknown time. There is also evidence of possible cerebral amyloid angiopathy  1. MRI of the brain contrast confirmed a small remote area of ischemia in the left frontal lobe. 2. We will attempt to obtain previous MRIs completed at [facility] for comparison to determine a timeline for his ischemic event. 3. Because the patient has a mechanical heart valve stopping anticoagulation would be contraindicated. The patient's is at risk due to possible cerebral amyloid angiopathy for cerebral hemorrhage. The patient had been contacted by phone regarding those results and the need to seek immediate attention for sudden onset of a headache or any weakness on one side or the other. 2. Chronic bilateral multi-level radiculopathy affecting at least the L2-L3 to S1 level and appears to be greatest in the L4-L5 level. The patient does have diminished vibration and temperature sensation bilaterally. He also displayed a a diminished pinprick in a glove stocking distribution bilaterally. Following the patient's EMG study, he had bruising, and likely a hematoma in his right lower extremity.   There is concern for possible swelling in the right lower calf as well.  1. B12, hemoglobin A1C, NICOLÁS screen, and TSH were normal  2. Order ultrasound of the right lower extremity to rule out a possible DVT  3. MRI Lumbar Spine WO Contrast, the patient does have an MRI compatible pacemaker  4. The patient does have a follow up visit scheduled for 2 weeks from today, recommended he push this appointment back if he is unable to get the MRI prior to his next visit               Signed: Zahra Villalta CNP    *Please note that portions of this note were completed with a voice recognition program.  Although every effort was made to insure the accuracy of this automated transcription, some errors in transcription may have occurred, occasionally words and are mis-transcribed    Provider Attestation: The documentation recorded by the scribe accurately reflects the service I personally performed and the decisions made by myself. Portions of this note were transcribed by a scribe. I personally performed the history, physical exam, and medical decision-making and confirm the accuracy of the information in the transcribed note. Scribe Attestation:   By signing my name below, Lonnie Jaeger, attest that this documentation has been prepared under the direction and in the presence of Zahra Villalta CNP.

## 2021-07-15 ENCOUNTER — HOSPITAL ENCOUNTER (OUTPATIENT)
Dept: VASCULAR LAB | Age: 61
Discharge: HOME | End: 2021-07-15
Payer: COMMERCIAL

## 2021-07-15 DIAGNOSIS — R22.41 LOCALIZED SWELLING OF RIGHT LOWER LEG: ICD-10-CM

## 2021-07-15 PROCEDURE — 93971 EXTREMITY STUDY: CPT

## 2021-07-22 ENCOUNTER — OFFICE VISIT (OUTPATIENT)
Dept: NEUROLOGY | Age: 61
End: 2021-07-22
Payer: COMMERCIAL

## 2021-07-22 VITALS
WEIGHT: 237 LBS | HEART RATE: 72 BPM | BODY MASS INDEX: 35.1 KG/M2 | TEMPERATURE: 97.3 F | DIASTOLIC BLOOD PRESSURE: 84 MMHG | SYSTOLIC BLOOD PRESSURE: 131 MMHG | HEIGHT: 69 IN

## 2021-07-22 DIAGNOSIS — I68.0 CEREBRAL AMYLOID ANGIOPATHY (CODE): ICD-10-CM

## 2021-07-22 DIAGNOSIS — I67.9 CEREBRAL VASCULAR DISEASE: ICD-10-CM

## 2021-07-22 DIAGNOSIS — M54.16 LUMBAR RADICULOPATHY: Primary | ICD-10-CM

## 2021-07-22 PROCEDURE — 3017F COLORECTAL CA SCREEN DOC REV: CPT | Performed by: NURSE PRACTITIONER

## 2021-07-22 PROCEDURE — 99214 OFFICE O/P EST MOD 30 MIN: CPT | Performed by: NURSE PRACTITIONER

## 2021-07-22 PROCEDURE — 1036F TOBACCO NON-USER: CPT | Performed by: NURSE PRACTITIONER

## 2021-07-22 PROCEDURE — G8427 DOCREV CUR MEDS BY ELIG CLIN: HCPCS | Performed by: NURSE PRACTITIONER

## 2021-07-22 PROCEDURE — G8417 CALC BMI ABV UP PARAM F/U: HCPCS | Performed by: NURSE PRACTITIONER

## 2021-07-22 NOTE — PROGRESS NOTES
[facility]            [address]          [place], [address] [facility]          Dept: [phone]          Dept Fax: [phone]        MD Michaela Arredondo MD Laurene Lutes, MD Karyl Limes, AUDREY            7/22/2021      HISTORY OF PRESENT ILLNESS:       I had the pleasure of seeing Ankita Castrejon, who returns for continuing neurologic care. The patient was seen last on July 8, 2021 for treatment of an acute headache caused by a fall and a chronic bilateral multi-level radiculopathy. The patient does have a chronic bilateral multi-level radiculopathy affective the L2-L3 to S1 level and is greatest in L4-L5. The patient did completed EMG/NCV studies which confirmed a multi-level radiculopathy. Following the EMG study the patient did notice a lump in his right lower extremity and an ultrasound of the extremity was ordered which revealed a small hematoma. An MRI of his lumbar spine was ordered at his last visit and does have it scheduled for next week. He is here today reporting that the problems in his right lower extremity have subsided. The patient did have a previous fall which caused an acute headache and had a CT of his head completed at that time. The CT did show an area concerning for a previous stroke and a MRI of his brain did confirm a previous stroke in his left frontal lobe. He reports today that he did have heart valve surgery in 2007 and believes the stroke may have happened then. He did have dizziness and nystagmus at that time. He notes today that it has improved and he is able to manage it. He did have previous visual disturbances that are described as floaters or stars in his vision that was consistent. The visual disturbances are infrequent at today's visit and have been decreased following his heart surgery.  There was no headache associated with these visual disturbance. He does get infrequent headaches and gets approximately 1 headache/week that does not last for the entire day. He was not placed on a prophylactic medication at this time. Testing reviewed:    Ultrasound of the right lower extremity 7/15/2021  Findings:        Right Impression:    The common femoral, femoral, popliteal and tibial veins demonstrate    normal compressibility and augmentation.        Small hematoma rt shin          EMG Bilateral lower extremities 6/22/2021  Assessment:  1. A chronic bilateral multi-level radiculopathy affecting at least the L2-L3 to S1 level and appears to be greatest in the L4-L5 level  2.  There is currently no electrophysiologic evidence of a significant plexopathy, myopathy or peripheral neuropathy noted at this time.      MRI Brain WO Contrast 5/7/2021  Impression   No acute intracranial abnormality.       Small remote area of ischemia in the left frontal lobe.       Scattered foci of supratentorial and infratentorial blooming artifact on   susceptibility weighted imaging that are nonspecific and differential   considerations include hypertensive microangiopathy and amyloid angiopathy.      Labs Completed 4/7/2021     Hemoglobin A1C 5.5  4.0 - 6.0 %   Estimated Avg Glucose 111  mg/dL      NICOLÁS NEGATIVE  NEGATIVE      TSH 1.67  0.30 - 5.00 mIU/L      Vitamin B-12 1227  232 - 1245 pg/mL   Folate 12.8  >4.8 ng/mL         PAST MEDICAL HISTORY:         Diagnosis Date    Acute diastolic CHF (congestive heart failure) ([address] 10/17/2017    Atrial fibrillation ([facility]     Neurocardiogenic syncope         PAST SURGICAL HISTORY:         Procedure Laterality Date    CARDIAC CATHETERIZATION  10/19/2017    right and left heart cath Dr. Kelly Vilchis  10/27/2017    [employer] PACEMAKER ADVISA SURESCAN MODEL#[id number] LEAD 9528-52, 4396-67 MRI CONDITIONAL NORMAL MODE/ REP/FORM/RN    MITRAL VALVE REPLACEMENT  02/2007    MITRAL VALVE REPLACEMENT N/A 10/23/2017    REDO STERNOTOMY, MITRAL VALVE REPLACEMENT 27MM MEDTRONIC OPEN PIVOT MECHANICAL, ON PUMP, SWAN, ALEENA performed by Malick Love MD at [place] [address]  04/19/2007    XR EYE FOREIGN BODY CLEARED BY DUNG CAMEJO. AND RECLEARED ON 4/21/2021    TRANSESOPHAGEAL ECHOCARDIOGRAM  10-18-19        SOCIAL HISTORY:     Social History     Socioeconomic History    Marital status:      Spouse name: Not on file    Number of children: Not on file    Years of education: Not on file    Highest education level: Not on file   Occupational History    Not on file   Tobacco Use    Smoking status: Never Smoker    Smokeless tobacco: Never Used   Vaping Use    Vaping Use: Never used   Substance and Sexual Activity    Alcohol use: Yes     Comment: very occasionally    Drug use: No    Sexual activity: Not on file   Other Topics Concern    Not on file   Social History Narrative    Not on file     Social Determinants of Health     Financial Resource Strain:     Difficulty of Paying Living Expenses:    Food Insecurity:     Worried About Running Out of Food in the Last Year:     [address] in the Last Year:    Transportation Needs:     Lack of Transportation (Medical):      Lack of Transportation (Non-Medical):    Physical Activity:     Days of Exercise per Week:     Minutes of Exercise per Session:    Stress:     Feeling of Stress :    Social Connections:     Frequency of Communication with Friends and Family:     Frequency of Social Gatherings with Friends and Family:     Attends [employer] Services:     Active Member of Clubs or Organizations:     Attends Club or Organization Meetings:     Marital Status:    Intimate Partner Violence:     Fear of Current or Ex-Partner:     Emotionally Abused:     Physically Abused:     Sexually Abused:        CURRENT MEDICATIONS:     Current Outpatient Medications   Medication Sig Dispense Refill    warfarin (COUMADIN) 5 MG tablet Take 1 tablet by mouth daily as directed by [facility]. [facility]. 90 tablet 3    acetaminophen (TYLENOL) 500 MG tablet Take 500 mg by mouth every 6 hours as needed for Pain      ezetimibe (ZETIA) 10 MG tablet Take 10 mg by mouth daily      tamsulosin (FLOMAX) 0.4 MG capsule Take 0.4 mg by mouth daily      Omega-3 Fatty Acids (FISH OIL) 1000 MG CAPS Take 1,000 mg by mouth daily      furosemide (LASIX) 40 MG tablet Take 40 mg by mouth daily as needed Indications: swelling in legs, at least once weekly       Testosterone 30 MG/ACT SOLN Place 60 mg onto the skin daily. Indications: 2 pumps daily = 60mg Patient alternating 30mg and 60mg every other day      midodrine (PROAMATINE) 2.5 MG tablet Take 2.5 mg by mouth 3 times daily as needed (SBP < 110)       citalopram (CELEXA) 10 MG tablet Take 5 mg by mouth daily      sotalol (BETAPACE) 80 MG tablet Take 80 mg by mouth every morning      sotalol (BETAPACE) 80 MG tablet Take 40 mg by mouth nightly      Multiple Vitamins-Minerals (THERAPEUTIC MULTIVITAMIN-MINERALS) tablet Take 1 tablet by mouth daily      aspirin 81 MG tablet Take 1 tablet by mouth daily 30 tablet 3     No current facility-administered medications for this visit. ALLERGIES:   No Known Allergies                              REVIEW OF SYSTEMS        All items selected indicate a positive finding. Those items not selected are negative.   Constitutional [] Weight loss/gain   [] Fatigue  [] Fever/Chills   HEENT [] Hearing Loss  [x] Visual Disturbance  [] Tinnitus  [] Eye pain   Respiratory [] Shortness of Breath  [] Cough  [] Snoring   Cardiovascular [] Chest Pain  [] Palpitations  [] Lightheaded   GI [] Constipation  [] Diarrhea  [] Swallowing change  [] Nausea/vomiting    [] Urinary Frequency  [] Urinary Urgency   Musculoskeletal [] Neck pain  [x] Back pain  [] Muscle pain  [] Restless legs   Dermatologic [] Skin changes   Neurologic [] Memory loss/confusion  [] Seizures  [x] Trouble walking or imbalance  [] Dizziness  [] Sleep disturbance  [] Weakness  [x] Numbness  [] Tremors  [] Speech Difficulty  [x] Headaches  [] Light Sensitivity  [] Sound Sensitivity   Endocrinology []Excessive thirst  []Excessive hunger   Psychiatric [] Anxiety/Depression  [] Hallucination   Allergy/immunology []Hives/environmental allergies   Hematologic/lymph [] Abnormal bleeding  [] Abnormal bruising         PHYSICAL EXAMINATION:       Vitals:    07/22/21 1113   BP: 131/84   Pulse: 72   Temp: 97.3 °F (36.3 °C)                                              .                                                                                                     General Appearance:  Alert, cooperative, no signs of distress, appears stated age   Head:  Normocephalic, no signs of trauma   Eyes:  Conjunctiva/corneas clear;  eyelids intact   Ears:  Normal external ear and canals   Nose: Nares normal, mucosa normal, no drainage    Throat: Lips and tongue normal; teeth normal;  gums normal   Neck: Supple, intact flexion, extension and rotation;   trachea midline;  no adenopathy;   thyroid: not enlarged;   no carotid pulse abnormality   Back:   Symmetric, no curvature, ROM adequate   Lungs:   Respirations unlabored   Heart:  Regular rate and rhythm           Extremities: Extremities normal, no cyanosis, no edema   Pulses: Symmetric over head and neck   Skin: Skin color, texture normal, no rashes, no lesions                                     NEUROLOGIC EXAMINATION    Neurologic Exam  Mental status    Alert and oriented x 3; intact memory with no confusion, speech or language problems; no hallucinations or delusions  Fund of information appropriate for level of education    Cranial nerves    II - visual fields intact to confrontation bilaterally  III, IV, VI  extra-ocular muscles full: no pupillary defect; no MARIO, no nystagmus, no ptosis   V - normal facial sensation VII - normal facial symmetry                                                             VIII - intact hearing                                                                             IX, X - symmetrical palate                                                                  XI - symmetrical shoulder shrug                                                       XII - tongue midline without atrophy or fasciculation      Motor function  Normal muscle bulk and tone; strength 5/5 on all 4 extremities, no pronator drift      Sensory function Diminished vibration and temperature sensation bilaterally      Cerebellar Intact fine motor movement. No involuntary movements or tremors. No ataxia or dysmetria on finger to nose or heel to shin testing      Reflex function DTR 2+ on bilateral UE and LE, symmetric. Negative Babinski      Gait                   normal base and arm swing                  Medical Decision Making: In summary, your patient, Cassandra Vivar exhibits the following, with associated plan:    1. Recent fall which caused an acute headache. A CT of the head showed an area concerning for recent ischemia, consistent with a previous stroke of unknown time. There is also evidence of possible cerebral amyloid angiopathy  1. MRI of the brain contrast confirmed a small remote area of ischemia in the left frontal lobe, as well as possible cerebral amyloid angiopathy. 2. MRIs from [facility] in 2008 were obtained and reviewed with the patient during his visit. At that time, there was no evidence of an infarct or cerebral amyloid angiopathy. 3. Because the patient has a mechanical heart valve stopping anticoagulation would be contraindicated. The patient's is at risk due to possible cerebral amyloid angiopathy for cerebral hemorrhage.   The patient had been contacted by phone regarding those results and the need to seek immediate attention for sudden onset of a headache or any weakness on one side or the other. 2. Chronic bilateral multi-level radiculopathy affecting at least the L2-L3 to S1 level and appears to be greatest in the L4-L5 level. The patient does have diminished vibration and temperature sensation bilaterally. He also displayed a a diminished pinprick in a glove stocking distribution bilaterally. 1. B12, hemoglobin A1C, NICOLÁS screen, and TSH were normal  2. US of the right lower extremity was normal  3. MRI Lumbar Spine WO Contrast is scheduled for next week, the patient does have an MRI compatible pacemaker  4. Return for follow up visit in 3 months            Signed: Lev Lees CNP      *Please note that portions of this note were completed with a voice recognition program.  Although every effort was made to insure the accuracy of this automated transcription, some errors in transcription may have occurred, occasionally words and are mis-transcribed    Provider Attestation: The documentation recorded by the scribe accurately reflects the service I personally performed and the decisions made by myself. Portions of this note were transcribed by a scribe. I personally performed the history, physical exam, and medical decision-making and confirm the accuracy of the information in the transcribed note. Scribe Attestation:   By signing my name below, Dandre Sierra, attest that this documentation has been prepared under the direction and in the presence of Lev Lees CNP.

## 2021-07-30 ENCOUNTER — HOSPITAL ENCOUNTER (OUTPATIENT)
Dept: MRI IMAGING | Age: 61
Discharge: HOME | End: 2021-08-01
Payer: COMMERCIAL

## 2021-07-30 DIAGNOSIS — M54.16 LUMBAR RADICULOPATHY: ICD-10-CM

## 2021-07-30 PROCEDURE — 72148 MRI LUMBAR SPINE W/O DYE: CPT

## 2021-08-02 ENCOUNTER — HOSPITAL ENCOUNTER (OUTPATIENT)
Dept: PHARMACY | Age: 61
Setting detail: THERAPIES SERIES
Discharge: HOME | End: 2021-08-02
Payer: COMMERCIAL

## 2021-08-02 DIAGNOSIS — Z95.2 H/O MITRAL VALVE REPLACEMENT WITH MECHANICAL VALVE: Primary | ICD-10-CM

## 2021-08-02 LAB
INR BLD: 2.5
PROTIME: 30.3 SECONDS

## 2021-08-02 PROCEDURE — 99211 OFF/OP EST MAY X REQ PHY/QHP: CPT

## 2021-08-02 PROCEDURE — 85610 PROTHROMBIN TIME: CPT

## 2021-08-02 NOTE — PROGRESS NOTES
Patient seen in clinic for warfarin management due to mechanical mitral valve with an INR goal of 2.5-3.5. Estimated duration of therapy is indefinite. Patient states compliant all of the time with regimen. No bleeding or thromboembolic side effects noted. No significant med changes. Patient states he did increase vitamin K intake since last visit given previous elevated INR. No significant recent illness or disease state changes. PT/INR done in office per protocol. INR is 2.5 which is therapeutic. Warfarin regimen will be continued at current dose of 2.5mg Tue/Thur and 5mg all other days. Will retest in 4 weeks. Patient understands dosing directions and information discussed. Dosing schedule and follow up appointment given to patient. Progress note routed to referring physicians office. Discussed with patient the Pharmacist Collaborative Practice Agreement. Patient provided verbal and/or electronic (ex. [email or web address]) consent to participate in the collaborative practice agreement between the pharmacist and referred patient. This is in lieu of paper consent due to COVID-19 precautions and the use of remote/virtual visits.        For Pharmacy Admin Tracking Only     Intervention Detail:    Total # of Interventions Recommended: 0   Total # of Interventions Accepted: 0   Time Spent (min): 15

## 2021-08-03 ENCOUNTER — APPOINTMENT (OUTPATIENT)
Dept: PHARMACY | Age: 61
End: 2021-08-03
Payer: COMMERCIAL

## 2021-08-24 ENCOUNTER — HOSPITAL ENCOUNTER (OUTPATIENT)
Age: 61
Discharge: HOME | End: 2021-08-24
Payer: COMMERCIAL

## 2021-08-24 LAB
HCT VFR BLD CALC: 49.4 % (ref 40.7–50.3)
HEMOGLOBIN: 15.8 G/DL (ref 13–17)
MCH RBC QN AUTO: 28.7 PG (ref 25.2–33.5)
MCHC RBC AUTO-ENTMCNC: 32 G/DL (ref 28.4–34.8)
MCV RBC AUTO: 89.7 FL (ref 82.6–102.9)
NRBC AUTOMATED: 0 PER 100 WBC
PDW BLD-RTO: 12.9 % (ref 11.8–14.4)
PLATELET # BLD: 119 K/UL (ref 138–453)
PMV BLD AUTO: 12.5 FL (ref 8.1–13.5)
PROSTATE SPECIFIC ANTIGEN: 1.55 UG/L
RBC # BLD: 5.51 M/UL (ref 4.21–5.77)
SEX HORMONE BINDING GLOBULIN: 34 NMOL/L (ref 11–80)
TESTOSTERONE FREE-NONMALE: 239.2 PG/ML (ref 47–244)
TESTOSTERONE TOTAL: 1005 NG/DL (ref 220–1000)
TESTOSTERONE, BIOAVAILABLE: 560.4 NG/DL (ref 130–680)
WBC # BLD: 4.9 K/UL (ref 3.5–11.3)

## 2021-08-24 PROCEDURE — 84403 ASSAY OF TOTAL TESTOSTERONE: CPT

## 2021-08-24 PROCEDURE — 84270 ASSAY OF SEX HORMONE GLOBUL: CPT

## 2021-08-24 PROCEDURE — G0103 PSA SCREENING: HCPCS

## 2021-08-24 PROCEDURE — 85027 COMPLETE CBC AUTOMATED: CPT

## 2021-08-24 PROCEDURE — 36415 COLL VENOUS BLD VENIPUNCTURE: CPT

## 2021-08-31 ENCOUNTER — HOSPITAL ENCOUNTER (OUTPATIENT)
Dept: PHARMACY | Age: 61
Setting detail: THERAPIES SERIES
Discharge: HOME | End: 2021-08-31
Payer: COMMERCIAL

## 2021-08-31 DIAGNOSIS — Z95.2 H/O MITRAL VALVE REPLACEMENT WITH MECHANICAL VALVE: Primary | ICD-10-CM

## 2021-08-31 LAB
INR BLD: 3.1
PROTIME: 37 SECONDS

## 2021-08-31 PROCEDURE — 99211 OFF/OP EST MAY X REQ PHY/QHP: CPT

## 2021-08-31 PROCEDURE — 85610 PROTHROMBIN TIME: CPT

## 2021-08-31 NOTE — PROGRESS NOTES
Patient seen in clinic for warfarin management due to mechanical mitral valve with an INR goal of 2.5-3.5. Estimated duration of therapy is indefinite. Patient states compliant all of the time with regimen. No bleeding or thromboembolic side effects noted. No significant med or dietary changes. No significant recent illness or disease state changes. Patient reports he has been having brussel sprouts 2-3 times per week which has not changed. PT/INR done in office per protocol. INR is 3.1 which is therapeutic. Warfarin regimen will be continued at current dose of 2.5mg tues/thurs and 5mg all other days. Will retest in 4 weeks. Patient understands dosing directions and information discussed. Dosing schedule and follow up appointment given to patient. Progress note routed to referring physicians office. Discussed with patient the Pharmacist Collaborative Practice Agreement. Patient provided verbal and/or electronic (ex. [email or web address]) consent to participate in the collaborative practice agreement between the pharmacist and referred patient. This is in lieu of paper consent due to COVID-19 precautions and the use of remote/virtual visits.        For Pharmacy Admin Tracking Only     Intervention Detail:    Total # of Interventions Recommended: 0   Total # of Interventions Accepted: 0   Time Spent (min): 15

## 2021-09-28 ENCOUNTER — HOSPITAL ENCOUNTER (OUTPATIENT)
Dept: PHARMACY | Age: 61
Setting detail: THERAPIES SERIES
Discharge: HOME | End: 2021-09-28
Payer: COMMERCIAL

## 2021-09-28 DIAGNOSIS — Z95.2 H/O MITRAL VALVE REPLACEMENT WITH MECHANICAL VALVE: Primary | ICD-10-CM

## 2021-09-28 LAB
INR BLD: 3
PROTIME: 36.5 SECONDS

## 2021-09-28 PROCEDURE — 85610 PROTHROMBIN TIME: CPT

## 2021-09-28 PROCEDURE — 99211 OFF/OP EST MAY X REQ PHY/QHP: CPT

## 2021-09-28 RX ORDER — POTASSIUM CITRATE 10 MEQ/1
10 TABLET, EXTENDED RELEASE ORAL DAILY
COMMUNITY

## 2021-09-28 NOTE — PROGRESS NOTES
Patient seen in clinic for warfarin management due to mechanical mitral valve with an INR goal of 2.5-3.5.  Estimated duration of therapy is indefinite. Patient states compliant all of the time with regimen. No bleeding or thromboembolic side effects noted. No significant recent illness or disease state changes. Aspirin was discontinued at the beginning of sept after discussion with his doctor. Patient states he has had multiple hemorrhagic strokes so he was worried about being on anticoagulant + ASA. He also states he is eating 1 less serving of veggies per week. PT/INR done in office per protocol. INR is 3.0 which is therapeutic. Warfarin regimen will be continued at current dose of 2.5mg tues/thurs and 5mg all other days. Will retest in 4 weeks. Patient understands dosing directions and information discussed. Dosing schedule and follow up appointment given to patient. Progress note routed to referring physicians office. Discussed with patient the Pharmacist Collaborative Practice Agreement. Patient provided verbal and/or electronic (ex. [email or web address]) consent to participate in the collaborative practice agreement between the pharmacist and referred patient. This is in lieu of paper consent due to COVID-19 precautions and the use of remote/virtual visits.        For Pharmacy Admin Tracking Only     Intervention Detail:    Total # of Interventions Recommended: 0   Total # of Interventions Accepted: 0   Time Spent (min): 15

## 2021-10-26 ENCOUNTER — HOSPITAL ENCOUNTER (OUTPATIENT)
Dept: PHARMACY | Age: 61
Setting detail: THERAPIES SERIES
Discharge: HOME | End: 2021-10-26
Payer: COMMERCIAL

## 2021-10-26 DIAGNOSIS — Z95.2 H/O MITRAL VALVE REPLACEMENT WITH MECHANICAL VALVE: Primary | ICD-10-CM

## 2021-10-26 LAB
INR BLD: 3.5
PROTIME: 41.6 SECONDS

## 2021-10-26 PROCEDURE — 99211 OFF/OP EST MAY X REQ PHY/QHP: CPT

## 2021-10-26 PROCEDURE — 85610 PROTHROMBIN TIME: CPT

## 2021-10-26 NOTE — PROGRESS NOTES
Patient seen in clinic for warfarin management due to mechanical mitral valve with an INR goal of 2.5-3.5. Estimated duration of therapy is indefinite. Patient states compliant all of the time with regimen. No bleeding or thromboembolic side effects noted. No significant med changes. Patient has been having brussel sprouts once weekly (down from 2 times before). No significant recent illness or disease state changes. PT/INR done in office per protocol. INR is 3.5 which is therapeutic. Warfarin regimen will be decreased to 2.5mg Tue/Thur/Sat and 5mg all other days as patient does not plan to increase dietary vitamin K at this time due to work schedule. Will retest in 4 weeks. Patient understands dosing directions and information discussed. Dosing schedule and follow up appointment given to patient. Progress note routed to referring physicians office. Discussed with patient the Pharmacist Collaborative Practice Agreement. Patient provided verbal and/or electronic (ex. [employer]) consent to participate in the collaborative practice agreement between the pharmacist and referred patient. This is in lieu of paper consent due to COVID-19 precautions and the use of remote/virtual visits.        For Pharmacy Admin Tracking Only     Intervention Detail: Dose Adjustment: 1, reason: Therapy De-escalation   Total # of Interventions Recommended: 1   Total # of Interventions Accepted: 1   Time Spent (min): 15

## 2021-11-15 ENCOUNTER — OFFICE VISIT (OUTPATIENT)
Dept: NEUROLOGY | Age: 61
End: 2021-11-15
Payer: COMMERCIAL

## 2021-11-15 VITALS
BODY MASS INDEX: 36.14 KG/M2 | SYSTOLIC BLOOD PRESSURE: 143 MMHG | DIASTOLIC BLOOD PRESSURE: 91 MMHG | TEMPERATURE: 96.6 F | HEIGHT: 69 IN | WEIGHT: 244 LBS | HEART RATE: 83 BPM

## 2021-11-15 DIAGNOSIS — I67.9 CEREBRAL VASCULAR DISEASE: ICD-10-CM

## 2021-11-15 DIAGNOSIS — R20.8 DECREASED SENSATION OF LOWER EXTREMITY: ICD-10-CM

## 2021-11-15 DIAGNOSIS — I68.0 CEREBRAL AMYLOID ANGIOPATHY (CODE): Primary | ICD-10-CM

## 2021-11-15 PROCEDURE — G8427 DOCREV CUR MEDS BY ELIG CLIN: HCPCS | Performed by: NURSE PRACTITIONER

## 2021-11-15 PROCEDURE — 3017F COLORECTAL CA SCREEN DOC REV: CPT | Performed by: NURSE PRACTITIONER

## 2021-11-15 PROCEDURE — 99214 OFFICE O/P EST MOD 30 MIN: CPT | Performed by: NURSE PRACTITIONER

## 2021-11-15 PROCEDURE — G8417 CALC BMI ABV UP PARAM F/U: HCPCS | Performed by: NURSE PRACTITIONER

## 2021-11-15 PROCEDURE — 1036F TOBACCO NON-USER: CPT | Performed by: NURSE PRACTITIONER

## 2021-11-15 PROCEDURE — G8484 FLU IMMUNIZE NO ADMIN: HCPCS | Performed by: NURSE PRACTITIONER

## 2021-11-15 NOTE — PROGRESS NOTES
[facility]            [address]          [place], [address] [facility]          Dept: [phone]          Dept Fax: [phone]    MD Jovan Yang MD Ahmed B. Veneta Levels, MD Sinclair Galt, MD Gertie Child, CNP            11/15/2021      HISTORY OF PRESENT ILLNESS:       I had the pleasure of seeing Carlee Morrison, who returns for continuing neurologic care. The patient was seen last on July 22, 2021 for treatment of recent fall which caused an an acute headache and chronic bilateral multi-level radiculopathy. The patient has a recent fall which caused an acute headache. A CT of the head showed an area concerning for recent ischemia, consistent with a previous stroke of unknown time. An MRI confirmed the evidence of remote left frontal infarct and there is also evidence of possible cerebral amyloid angiopathy. The patient has a mechanical heart valve stopping anticoagulation. The patient is at risk due to possible cerebral amyloid angiopathy for cerebral hemorrhage. The patient is prescribed Coumadin 5 mg daily. The patient states he spoke with his cardiologist and no longer takes aspirin. The patient states his cardiology problems are currently resolved and stable. The patient is here today reporting no headaches. The patient follows with Dr. Eitan Osei, an endocrinologist for low testosterone levels. The patient has chronic bilateral multi-level radiculopathy affecting at least the L2-L3 to S1 level and appears to be greatest in the L4-L5 level. The patient does have diminished vibration and temperature sensation bilaterally, a diminished pinprick in a glove stocking distribution bilaterally. An MRI Lumbar Spine without contrast was obtained which showed degenerative disc disease with mild L4-L5 spinal canal stenosis and mild bilateral L4-L5 and L5-S1 neural foraminal stenosis.  The patient is here today reporting he has loss of sensation in his feet. He notices it often when he is driving and cannot feel pedals. He reports a significant decrease in his back pain. Testing reviewed:    MRI Lumbar Spine WO Contrast 7/30/2021  Mild L4-L5 and L5-S1 degenerative disc disease with mild L4-L5 spinal canal   stenosis.       Mild bilateral L4-L5 and L5-S1 neural foraminal stenosis. Ultrasound of the right lower extremity 7/15/2021  Findings:        Right Impression:    The common femoral, femoral, popliteal and tibial veins demonstrate    normal compressibility and augmentation.        Small hematoma rt shin            EMG Bilateral lower extremities 6/22/2021  Assessment:  1. A chronic bilateral multi-level radiculopathy affecting at least the L2-L3 to S1 level and appears to be greatest in the L4-L5 level  2.  There is currently no electrophysiologic evidence of a significant plexopathy, myopathy or peripheral neuropathy noted at this time.      MRI Brain WO Contrast 5/7/2021  Impression   No acute intracranial abnormality.       Small remote area of ischemia in the left frontal lobe.       Scattered foci of supratentorial and infratentorial blooming artifact on   susceptibility weighted imaging that are nonspecific and differential   considerations include hypertensive microangiopathy and amyloid angiopathy.      Labs Completed 4/7/2021     Hemoglobin A1C 5.5  4.0 - 6.0 %   Estimated Avg Glucose 111  mg/dL      N[place] NEGATIVE  NEGATIVE      TSH 1.67  0.30 - 5.00 mIU/L      Vitamin B-12 1227  232 - 1245 pg/mL   Folate 12.8  >4.8 ng/mL           PAST MEDICAL HISTORY:         Diagnosis Date    Acute diastolic CHF (congestive heart failure) ([address] 10/17/2017    Atrial fibrillation ([address]     Neurocardiogenic syncope         PAST SURGICAL HISTORY:         Procedure Laterality Date    CARDIAC CATHETERIZATION  10/19/2017    right and left heart cath Dr. Geraldine Monzon  10/27/2017    [employer] ADVISA Miguel A Sexton Marital Status: Not on file   Intimate Partner Violence:     Fear of Current or Ex-Partner: Not on file    Emotionally Abused: Not on file    Physically Abused: Not on file    Sexually Abused: Not on file   Housing Stability:     Unable to Pay for Housing in the Last Year: Not on file    Number of [place] in the Last Year: Not on file    Unstable Housing in the Last Year: Not on file       CURRENT MEDICATIONS:     Current Outpatient Medications   Medication Sig Dispense Refill    potassium citrate (UROCIT-K) 10 MEQ (1080 MG) extended release tablet Take 10 mEq by mouth daily      warfarin (COUMADIN) 5 MG tablet Take 1 tablet by mouth daily as directed by [facility]. 90 tablet 3    acetaminophen (TYLENOL) 500 MG tablet Take 500 mg by mouth every 6 hours as needed for Pain      ezetimibe (ZETIA) 10 MG tablet Take 10 mg by mouth daily      tamsulosin (FLOMAX) 0.4 MG capsule Take 0.4 mg by mouth daily      Omega-3 Fatty Acids (FISH OIL) 1000 MG CAPS Take 1,000 mg by mouth daily      furosemide (LASIX) 40 MG tablet Take 40 mg by mouth daily as needed Indications: swelling in legs, at least once weekly       Testosterone 30 MG/ACT SOLN Place 60 mg onto the skin daily. Indications: 2 pumps daily = 60mg Patient alternating 30mg and 60mg every other day      midodrine (PROAMATINE) 2.5 MG tablet Take 2.5 mg by mouth 3 times daily as needed (SBP < 110)       citalopram (CELEXA) 10 MG tablet Take 5 mg by mouth daily      sotalol (BETAPACE) 80 MG tablet Take 80 mg by mouth every morning      sotalol (BETAPACE) 80 MG tablet Take 40 mg by mouth nightly      Multiple Vitamins-Minerals (THERAPEUTIC MULTIVITAMIN-MINERALS) tablet Take 1 tablet by mouth daily       No current facility-administered medications for this visit. ALLERGIES:   No Known Allergies                              REVIEW OF SYSTEMS        All items selected indicate a positive finding.     Those items not selected are negative.   Constitutional [] Weight loss/gain   [] Fatigue  [] Fever/Chills   HEENT [] Hearing Loss  [x] Visual Disturbance  [] Tinnitus  [] Eye pain   Respiratory [] Shortness of Breath  [] Cough  [] Snoring   Cardiovascular [] Chest Pain  [] Palpitations  [] Lightheaded   GI [] Constipation  [] Diarrhea  [] Swallowing change  [] Nausea/vomiting    [] Urinary Frequency  [] Urinary Urgency   Musculoskeletal [] Neck pain  [x] Back pain  [] Muscle pain  [] Restless legs   Dermatologic [] Skin changes   Neurologic [] Memory loss/confusion  [] Seizures  [x] Trouble walking or imbalance  [] Dizziness  [] Sleep disturbance  [] Weakness  [x] Numbness  [] Tremors  [] Speech Difficulty  [x] Headaches  [] Light Sensitivity  [] Sound Sensitivity   Endocrinology []Excessive thirst  []Excessive hunger   Psychiatric [] Anxiety/Depression  [] Hallucination   Allergy/immunology []Hives/environmental allergies   Hematologic/lymph [] Abnormal bleeding  [] Abnormal bruising         PHYSICAL EXAMINATION:       Vitals:    11/15/21 1138   BP: (!) 143/91   Pulse: 83   Temp:                                               .                                                                                                    General Appearance:  Alert, cooperative, no signs of distress, appears stated age   Head:  Normocephalic, no signs of trauma   Eyes:  Conjunctiva/corneas clear;  eyelids intact   Ears:  Normal external ear and canals   Nose: Nares normal, mucosa normal, no drainage    Throat: Lips and tongue normal; teeth normal;  gums normal   Neck: Supple, intact flexion, extension and rotation;   trachea midline;  no adenopathy;   thyroid: not enlarged;   no carotid pulse abnormality   Back:   Symmetric, no curvature, ROM adequate   Lungs:   Respirations unlabored   Heart:  Regular rate and rhythm           Extremities: Extremities normal, no cyanosis, no edema   Pulses: Symmetric over head and neck   Skin: Skin color, texture normal, no rashes, no lesions                                     NEUROLOGIC EXAMINATION    Neurologic Exam  Mental status    Alert and oriented x 3; intact memory with no confusion, speech or language problems; no hallucinations or delusions  Fund of information appropriate for level of education    Cranial nerves    II - visual fields intact to confrontation bilaterally  III, IV, VI  extra-ocular muscles full: no pupillary defect; no MARIO, no nystagmus, no ptosis   V - normal facial sensation                                                               VII - normal facial symmetry                                                             VIII - intact hearing                                                                             IX, X - symmetrical palate                                                                  XI - symmetrical shoulder shrug                                                       XII - tongue midline without atrophy or fasciculation      Motor function  Normal muscle bulk and tone; strength 5/5 on all 4 extremities, no pronator drift      Sensory function Diminished vibration, pinprick, and temperature sensation bilaterally. Cerebellar Intact fine motor movement. No involuntary movements or tremors. No ataxia or dysmetria on finger to nose or heel to shin testing      Reflex function DTR 2+ on bilateral UE and LE, symmetric. Down going toes bilaterally      Gait                   normal base and arm swing                  Medical Decision Making: In summary, your patient, Silvina Rodriguez exhibits the following, with associated plan:    1. Recent fall which caused an acute headache. A CT of the head showed an area concerning for recent ischemia, consistent with a previous stroke of unknown time. There is also evidence of possible cerebral amyloid angiopathy. 1. The patient is here today reporting no headaches.    2. The patient continues to follow with cardiology and has mechanical heart valve stopping anticoagulation. 3. Continue Coumadin, as prescribed by cardiology   4. Patient advised to go to emergency if any new onset headaches. 2. Chronic bilateral multi-level radiculopathy affecting at least the L2-L3 to S1 level and appears to be greatest in the L4-L5 level. The patient has diminished vibration and temperature sensation bilaterally. He also displayed a diminished pinprick in a glove stocking distribution bilaterally. 1. The patient obtained an MRI of the lumbar spine without contrast which showed evidence of mild L4-L5 degenerative disc disease with mild L4-L5 spinal canal stenosis and mild bilateral L4-L5 and L5-S1 neural foraminal stenosis. 2. Return for yearly evaluation. MMSE will be completed at this time for complaints of mild memory problems at the end of his visit. Signed: Christopher Hanson CNP      *Please note that portions of this note were completed with a voice recognition program.  Although every effort was made to insure the accuracy of this automated transcription, some errors in transcription may have occurred, occasionally words and are mis-transcribed    Provider Attestation: The documentation recorded by the scribe accurately reflects the service I personally performed and the decisions made by myself. Portions of this note were transcribed by a scribe. I personally performed the history, physical exam, and medical decision-making and confirm the accuracy of the information in the transcribed note. Scribe Attestation:   By signing my name below, Scott Brewster, attest that this documentation has been prepared under the direction and in the presence of Christopher Hanson CNP.

## 2021-11-23 ENCOUNTER — HOSPITAL ENCOUNTER (OUTPATIENT)
Dept: PHARMACY | Age: 61
Setting detail: THERAPIES SERIES
Discharge: HOME | End: 2021-11-23
Payer: COMMERCIAL

## 2021-11-23 DIAGNOSIS — Z95.2 H/O MITRAL VALVE REPLACEMENT WITH MECHANICAL VALVE: Primary | ICD-10-CM

## 2021-11-23 LAB
INR BLD: 1.8
PROTIME: 21.4 SECONDS

## 2021-11-23 PROCEDURE — 85610 PROTHROMBIN TIME: CPT

## 2021-11-23 PROCEDURE — 99212 OFFICE O/P EST SF 10 MIN: CPT

## 2021-11-23 NOTE — PROGRESS NOTES
Patient seen in clinic for warfarin management due to mechanical mitral valve with an INR goal of 2.5-3.5. Estimated duration of therapy is indefinite. Patient states compliant all of the time with regimen. No bleeding or thromboembolic side effects noted. No significant med or dietary changes. No significant recent illness or disease state changes. Patient reports he ate the same amount of brussel sprouts as he had been before (he was considering lowering intake but since INR was 3.5 last time he wanted to keep eating the same amount of Vit K)    PT/INR done in office per protocol. INR is 1.8 which is subtherapeutic. Warfarin regimen will be increased to 2.5mg tues/thurs and 5mg all other days. Will retest in 2 weeks. Patient understands dosing directions and information discussed. Dosing schedule and follow up appointment given to patient. Progress note routed to referring physicians office. Discussed with patient the Pharmacist Collaborative Practice Agreement. Patient provided verbal and/or electronic (ex. Biodirectionhart) consent to participate in the collaborative practice agreement between the pharmacist and referred patient. This is in lieu of paper consent due to COVID-19 precautions and the use of remote/virtual visits.        For Pharmacy Admin Tracking Only     Intervention Detail: Dose Adjustment: 1, reason: Therapy Optimization   Total # of Interventions Recommended: 1   Total # of Interventions Accepted: 1   Time Spent (min): 15

## 2021-12-07 ENCOUNTER — HOSPITAL ENCOUNTER (OUTPATIENT)
Dept: PHARMACY | Age: 61
Setting detail: THERAPIES SERIES
Discharge: HOME | End: 2021-12-07
Payer: COMMERCIAL

## 2021-12-07 DIAGNOSIS — Z95.2 H/O MITRAL VALVE REPLACEMENT WITH MECHANICAL VALVE: Primary | ICD-10-CM

## 2021-12-07 LAB
INR BLD: 2.7
PROTIME: 32.9 SECONDS

## 2021-12-07 PROCEDURE — 99211 OFF/OP EST MAY X REQ PHY/QHP: CPT

## 2021-12-07 PROCEDURE — 85610 PROTHROMBIN TIME: CPT

## 2021-12-21 ENCOUNTER — HOSPITAL ENCOUNTER (OUTPATIENT)
Dept: PHARMACY | Age: 61
Setting detail: THERAPIES SERIES
Discharge: HOME | End: 2021-12-21
Payer: COMMERCIAL

## 2021-12-21 DIAGNOSIS — Z95.2 H/O MITRAL VALVE REPLACEMENT WITH MECHANICAL VALVE: Primary | ICD-10-CM

## 2021-12-21 LAB
INR BLD: 3
PROTIME: 35.7 SECONDS

## 2021-12-21 PROCEDURE — 85610 PROTHROMBIN TIME: CPT

## 2021-12-21 PROCEDURE — 99211 OFF/OP EST MAY X REQ PHY/QHP: CPT

## 2021-12-21 NOTE — PROGRESS NOTES
Patient seen in clinic for warfarin management due to mechanical mitral valve with an INR goal of 2.5-3.5. Estimated duration of therapy is indefinite. Patient states compliant all of the time with regimen. No bleeding or thromboembolic side effects noted. No significant med or dietary changes. No significant recent illness or disease state changes. PT/INR done in office per protocol. INR is 3.0 which is therapeutic. Warfarin regimen will be continued at current dose of 2.5mg tues and 5mg all other days. Will retest in 3 weeks. Patient understands dosing directions and information discussed. Dosing schedule and follow up appointment given to patient. Progress note routed to referring physicians office. Discussed with patient the Pharmacist Collaborative Practice Agreement. Patient provided verbal and/or electronic (ex. Ubiquitous Energyhart) consent to participate in the collaborative practice agreement between the pharmacist and referred patient. This is in lieu of paper consent due to COVID-19 precautions and the use of remote/virtual visits.        For Pharmacy Admin Tracking Only     Intervention Detail:    Total # of Interventions Recommended: 0   Total # of Interventions Accepted: 0   Time Spent (min): 15

## 2022-01-12 ENCOUNTER — HOSPITAL ENCOUNTER (OUTPATIENT)
Age: 62
Discharge: HOME | End: 2022-01-12
Payer: COMMERCIAL

## 2022-01-12 ENCOUNTER — HOSPITAL ENCOUNTER (OUTPATIENT)
Dept: PHARMACY | Age: 62
Setting detail: THERAPIES SERIES
Discharge: HOME | End: 2022-01-12
Payer: COMMERCIAL

## 2022-01-12 DIAGNOSIS — Z95.2 H/O MITRAL VALVE REPLACEMENT WITH MECHANICAL VALVE: Primary | ICD-10-CM

## 2022-01-12 LAB
ALBUMIN SERPL-MCNC: 4.4 G/DL (ref 3.5–5.2)
ALBUMIN/GLOBULIN RATIO: 1.8 (ref 1–2.5)
ALP BLD-CCNC: 84 U/L (ref 40–129)
ALT SERPL-CCNC: 31 U/L (ref 5–41)
AST SERPL-CCNC: 23 U/L
BILIRUB SERPL-MCNC: 1.38 MG/DL (ref 0.3–1.2)
BILIRUBIN DIRECT: 0.26 MG/DL
BILIRUBIN, INDIRECT: 1.12 MG/DL (ref 0–1)
GLOBULIN: ABNORMAL G/DL (ref 1.5–3.8)
HCT VFR BLD CALC: 49.1 % (ref 40.7–50.3)
HEMOGLOBIN: 16.1 G/DL (ref 13–17)
INR BLD: 1.9
MCH RBC QN AUTO: 28.6 PG (ref 25.2–33.5)
MCHC RBC AUTO-ENTMCNC: 32.8 G/DL (ref 28.4–34.8)
MCV RBC AUTO: 87.2 FL (ref 82.6–102.9)
NRBC AUTOMATED: 0 PER 100 WBC
PDW BLD-RTO: 12.8 % (ref 11.8–14.4)
PLATELET # BLD: 121 K/UL (ref 138–453)
PMV BLD AUTO: 12.9 FL (ref 8.1–13.5)
PROSTATE SPECIFIC ANTIGEN: 1.6 UG/L
PROTIME: 22.5 SECONDS
RBC # BLD: 5.63 M/UL (ref 4.21–5.77)
TOTAL PROTEIN: 6.9 G/DL (ref 6.4–8.3)
WBC # BLD: 5.1 K/UL (ref 3.5–11.3)

## 2022-01-12 PROCEDURE — 80076 HEPATIC FUNCTION PANEL: CPT

## 2022-01-12 PROCEDURE — 36415 COLL VENOUS BLD VENIPUNCTURE: CPT

## 2022-01-12 PROCEDURE — 85610 PROTHROMBIN TIME: CPT

## 2022-01-12 PROCEDURE — 99212 OFFICE O/P EST SF 10 MIN: CPT

## 2022-01-12 PROCEDURE — 85027 COMPLETE CBC AUTOMATED: CPT

## 2022-01-12 PROCEDURE — 84153 ASSAY OF PSA TOTAL: CPT

## 2022-01-12 NOTE — PROGRESS NOTES
Patient seen in clinic for warfarin management due to mechanical mitral valve with an INR goal of 2.5-3.5.  Estimated duration of therapy is indefinite. Patient states compliant all of the time with regimen. No bleeding or thromboembolic side effects noted. No significant med  changes. No significant recent illness or disease state changes. Patient thinks he had more brussel sprouts over the last week. Will aim to eat once weekly. PT/INR done in office per protocol. INR is 1.9 which is subtherapeutic. Warfarin regimen will be increased to 7.5mg x 1 then continue usual dose. Will retest in 2 weeks. Patient understands dosing directions and information discussed. Dosing schedule and follow up appointment given to patient. Progress note routed to referring physicians office. Discussed with patient the Pharmacist Collaborative Practice Agreement. Patient provided verbal and/or electronic (ex. el?[email or web address]) consent to participate in the collaborative practice agreement between the pharmacist and referred patient. This is in lieu of paper consent due to COVID-19 precautions and the use of remote/virtual visits.        For Pharmacy Admin Tracking Only     Intervention Detail: Dose Adjustment: 1, reason: Therapy Optimization   Total # of Interventions Recommended: 1   Total # of Interventions Accepted: 1   Time Spent (min): 15

## 2022-01-25 ENCOUNTER — HOSPITAL ENCOUNTER (OUTPATIENT)
Dept: PHARMACY | Age: 62
Setting detail: THERAPIES SERIES
Discharge: HOME | End: 2022-01-25
Payer: COMMERCIAL

## 2022-01-25 DIAGNOSIS — Z95.2 H/O MITRAL VALVE REPLACEMENT WITH MECHANICAL VALVE: Primary | ICD-10-CM

## 2022-01-25 LAB
INR BLD: 3.5
PROTIME: 41.8 SECONDS

## 2022-01-25 PROCEDURE — 85610 PROTHROMBIN TIME: CPT

## 2022-01-25 PROCEDURE — 99211 OFF/OP EST MAY X REQ PHY/QHP: CPT

## 2022-01-25 NOTE — PROGRESS NOTES
Patient seen in clinic for warfarin management due to mechanical mitral valve with an INR goal of 2.5-3.5.  Estimated duration of therapy is indefinite. Patient states compliant all of the time with regimen. No bleeding or thromboembolic side effects noted. No significant med changes. No significant recent illness or disease state changes. Patient stopped eating brussel sprouts as he confirmed he was only eating them before to control his INR. PT/INR done in office per protocol. INR is 3.5 which is therapeutic. Warfarin regimen will be continued at current dose of 2.5mg tues and 5mg all other days. Will retest in 3 weeks. Patient understands dosing directions and information discussed. Dosing schedule and follow up appointment given to patient. Progress note routed to referring physicians office. Discussed with patient the Pharmacist Collaborative Practice Agreement. Patient provided verbal and/or electronic (ex. Dash Hudsonhart) consent to participate in the collaborative practice agreement between the pharmacist and referred patient. This is in lieu of paper consent due to COVID-19 precautions and the use of remote/virtual visits.        For Pharmacy Admin Tracking Only     Intervention Detail:    Total # of Interventions Recommended: 0   Total # of Interventions Accepted: 0   Time Spent (min): 15

## 2022-01-26 ENCOUNTER — TELEPHONE (OUTPATIENT)
Dept: PHARMACY | Age: 62
End: 2022-01-26

## 2022-01-26 DIAGNOSIS — Z95.2 H/O MITRAL VALVE REPLACEMENT WITH MECHANICAL VALVE: Primary | ICD-10-CM

## 2022-01-26 RX ORDER — WARFARIN SODIUM 5 MG/1
5 TABLET ORAL DAILY
Qty: 90 TABLET | Refills: 3 | Status: SHIPPED | OUTPATIENT
Start: 2022-01-26

## 2022-01-26 NOTE — TELEPHONE ENCOUNTER
Sent in warfarin refill to [facility] per patient request    For Pharmacy Admin Tracking Only     Intervention Detail: Refill(s) Provided   Total # of Interventions Recommended: 1   Total # of Interventions Accepted: 1   Time Spent (min): 15

## 2022-02-01 ENCOUNTER — HOSPITAL ENCOUNTER (OUTPATIENT)
Age: 62
Discharge: HOME | End: 2022-02-01
Payer: COMMERCIAL

## 2022-02-01 LAB
HCT VFR BLD CALC: 47.4 % (ref 40.7–50.3)
HEMOGLOBIN: 15.4 G/DL (ref 13–17)
MCH RBC QN AUTO: 28.9 PG (ref 25.2–33.5)
MCHC RBC AUTO-ENTMCNC: 32.5 G/DL (ref 28.4–34.8)
MCV RBC AUTO: 88.9 FL (ref 82.6–102.9)
NRBC AUTOMATED: 0 PER 100 WBC
PDW BLD-RTO: 12.9 % (ref 11.8–14.4)
PLATELET # BLD: NORMAL K/UL (ref 138–453)
PLATELET, FLUORESCENCE: 121 K/UL (ref 138–453)
PLATELET, IMMATURE FRACTION: 13.6 % (ref 1.1–10.3)
PMV BLD AUTO: NORMAL FL (ref 8.1–13.5)
RBC # BLD: 5.33 M/UL (ref 4.21–5.77)
SEX HORMONE BINDING GLOBULIN: 43 NMOL/L (ref 11–80)
TESTOSTERONE FREE-NONMALE: 114.3 PG/ML (ref 47–244)
TESTOSTERONE TOTAL: 620 NG/DL (ref 220–1000)
TESTOSTERONE, BIOAVAILABLE: 267.9 NG/DL (ref 130–680)
WBC # BLD: 5.4 K/UL (ref 3.5–11.3)

## 2022-02-01 PROCEDURE — 84270 ASSAY OF SEX HORMONE GLOBUL: CPT

## 2022-02-01 PROCEDURE — 36415 COLL VENOUS BLD VENIPUNCTURE: CPT

## 2022-02-01 PROCEDURE — 84403 ASSAY OF TOTAL TESTOSTERONE: CPT

## 2022-02-01 PROCEDURE — 85027 COMPLETE CBC AUTOMATED: CPT

## 2022-02-01 PROCEDURE — 85055 RETICULATED PLATELET ASSAY: CPT

## 2022-02-07 ENCOUNTER — HOSPITAL ENCOUNTER (OUTPATIENT)
Age: 62
Discharge: HOME | End: 2022-02-07
Payer: COMMERCIAL

## 2022-02-07 LAB
CHOLESTEROL, FASTING: 171 MG/DL
CHOLESTEROL/HDL RATIO: 4.3
HDLC SERPL-MCNC: 40 MG/DL
LDL CHOLESTEROL: 113 MG/DL (ref 0–130)
TRIGLYCERIDE, FASTING: 90 MG/DL
VLDLC SERPL CALC-MCNC: ABNORMAL MG/DL (ref 1–30)

## 2022-02-07 PROCEDURE — 36415 COLL VENOUS BLD VENIPUNCTURE: CPT

## 2022-02-07 PROCEDURE — 80061 LIPID PANEL: CPT

## 2022-02-15 ENCOUNTER — HOSPITAL ENCOUNTER (OUTPATIENT)
Dept: PHARMACY | Age: 62
Setting detail: THERAPIES SERIES
Discharge: HOME | End: 2022-02-15
Payer: COMMERCIAL

## 2022-02-15 DIAGNOSIS — Z95.2 H/O MITRAL VALVE REPLACEMENT WITH MECHANICAL VALVE: Primary | ICD-10-CM

## 2022-02-15 LAB
INR BLD: 3.4
PROTIME: 40.9 SECONDS

## 2022-02-15 PROCEDURE — 99211 OFF/OP EST MAY X REQ PHY/QHP: CPT

## 2022-02-15 PROCEDURE — 85610 PROTHROMBIN TIME: CPT

## 2022-02-15 RX ORDER — SILDENAFIL 100 MG/1
100 TABLET, FILM COATED ORAL PRN
COMMUNITY

## 2022-02-15 NOTE — PROGRESS NOTES
Patient seen in clinic for warfarin management due to mechanical mitral valve with an INR goal of 2.5-3.5.  Estimated duration of therapy is indefinite. Patient states compliant all of the time with regimen. No bleeding or thromboembolic side effects noted. No significant med or dietary changes. No significant recent illness or disease state changes. PT/INR done in office per protocol. INR is 3.4 which is therapeutic. Warfarin regimen will be continued at current dose of 2.5mg tues and 5mg all other days. Will retest in 4 weeks. Patient understands dosing directions and information discussed. Dosing schedule and follow up appointment given to patient. Progress note routed to referring physicians office. Discussed with patient the Pharmacist Collaborative Practice Agreement. Patient provided verbal and/or electronic (ex. PolyPidhart) consent to participate in the collaborative practice agreement between the pharmacist and referred patient. This is in lieu of paper consent due to COVID-19 precautions and the use of remote/virtual visits.        For Pharmacy Admin Tracking Only     Intervention Detail:    Total # of Interventions Recommended: 0   Total # of Interventions Accepted: 0   Time Spent (min): 15

## 2022-03-15 ENCOUNTER — HOSPITAL ENCOUNTER (OUTPATIENT)
Dept: PHARMACY | Age: 62
Setting detail: THERAPIES SERIES
Discharge: HOME | End: 2022-03-15
Payer: COMMERCIAL

## 2022-03-15 DIAGNOSIS — Z95.2 H/O MITRAL VALVE REPLACEMENT WITH MECHANICAL VALVE: Primary | ICD-10-CM

## 2022-03-15 LAB
INR BLD: 3.4
PROTIME: 40.4 SECONDS

## 2022-03-15 PROCEDURE — 85610 PROTHROMBIN TIME: CPT

## 2022-03-15 PROCEDURE — 99211 OFF/OP EST MAY X REQ PHY/QHP: CPT

## 2022-03-15 NOTE — PROGRESS NOTES
Patient seen in clinic for warfarin management due to mechanical mitral valve with an INR goal of 2.5-3.5.  Estimated duration of therapy is indefinite. Patient states compliant all of the time with regimen. No bleeding or thromboembolic side effects noted. No significant med or dietary changes. No significant recent illness or disease state changes. PT/INR done in office per protocol. INR is 3.4 which is therapeutic. Warfarin regimen will be continued at current dose of 2.5mg tues and 5mg all other days. Will retest in 4 weeks. Patient understands dosing directions and information discussed. Dosing schedule and follow up appointment given to patient. Progress note routed to referring physicians office. Discussed with patient the Pharmacist Collaborative Practice Agreement. Patient provided verbal and/or electronic (ex. Capture Mediahart) consent to participate in the collaborative practice agreement between the pharmacist and referred patient. This is in lieu of paper consent due to COVID-19 precautions and the use of remote/virtual visits.        For Pharmacy Admin Tracking Only     Intervention Detail:    Total # of Interventions Recommended: 0   Total # of Interventions Accepted: 0   Time Spent (min): 15

## 2022-04-12 ENCOUNTER — HOSPITAL ENCOUNTER (OUTPATIENT)
Dept: PHARMACY | Age: 62
Setting detail: THERAPIES SERIES
Discharge: HOME | End: 2022-04-12
Payer: COMMERCIAL

## 2022-04-12 DIAGNOSIS — Z95.2 H/O MITRAL VALVE REPLACEMENT WITH MECHANICAL VALVE: Primary | ICD-10-CM

## 2022-04-12 LAB
INR BLD: 4.2
PROTIME: 50.9 SECONDS

## 2022-04-12 PROCEDURE — 99212 OFFICE O/P EST SF 10 MIN: CPT

## 2022-04-12 PROCEDURE — 85610 PROTHROMBIN TIME: CPT

## 2022-04-12 RX ORDER — ROSUVASTATIN CALCIUM 5 MG/1
TABLET, COATED ORAL
COMMUNITY
Start: 2022-03-22

## 2022-04-12 NOTE — PROGRESS NOTES
Patient seen in clinic for warfarin management due to mechanical mitral valve with an INR goal of 2.5-3.5. Estimated duration of therapy is indefinite. Patient states compliant all of the time with regimen. No bleeding or thromboembolic side effects noted. No dietary changes. Patient is started on rosuvastatin 5mg daily last month and according to [employer], there is a minor interaction exists between crestor and warfarin. 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 No significant recent illness or disease state changes. PT/INR done in office per protocol. INR is 4.2 which is supratherapeutic. Warfarin regimen will be 2.5mg x 1 then lower maintenance dose to 2.5mg tues/fri and 5mg all other days    Will retest in 2 weeks. Patient understands dosing directions and information discussed. Dosing schedule and follow up appointment given to patient. Progress note routed to referring physicians office. Discussed with patient the Pharmacist Collaborative Practice Agreement. Patient provided verbal and/or electronic (ex. AbraRestohart) consent to participate in the collaborative practice agreement between the pharmacist and referred patient. This is in lieu of paper consent due to COVID-19 precautions and the use of remote/virtual visits.        For Pharmacy Admin Tracking Only     Intervention Detail: Dose Adjustment: 1, reason: Therapy De-escalation   Total # of Interventions Recommended: 1   Total # of Interventions Accepted: 1   Time Spent (min): 15

## 2022-04-26 ENCOUNTER — HOSPITAL ENCOUNTER (OUTPATIENT)
Dept: PHARMACY | Age: 62
Setting detail: THERAPIES SERIES
Discharge: HOME | End: 2022-04-26
Payer: COMMERCIAL

## 2022-04-26 DIAGNOSIS — Z95.2 H/O MITRAL VALVE REPLACEMENT WITH MECHANICAL VALVE: Primary | ICD-10-CM

## 2022-04-26 LAB
INR BLD: 2.7
PROTIME: 32.9 SECONDS

## 2022-04-26 PROCEDURE — 85610 PROTHROMBIN TIME: CPT

## 2022-04-26 PROCEDURE — 99211 OFF/OP EST MAY X REQ PHY/QHP: CPT

## 2022-04-26 NOTE — PROGRESS NOTES
Patient seen in clinic for warfarin management due to mechanical mitral valve with an INR goal of 2.5-3.5. Estimated duration of therapy is indefinite. Patient states compliant all of the time with regimen. No bleeding or thromboembolic side effects noted. No significant med changes. Patient states he had brussels sprouts the day after appt and last Monday 4/18 due to high INR. No significant recent illness or disease state changes. PT/INR done in office per protocol. INR is 2.7 which is therapeutic. Warfarin regimen will be continued at current dose of 2.5mg Tue Fri and 5mg on all other days. Will retest in 3 weeks. Patient understands dosing directions and information discussed. Dosing schedule and follow up appointment given to patient. Progress note routed to referring physicians office. Discussed with patient the Pharmacist Collaborative Practice Agreement. Patient provided verbal and/or electronic (ex. E-Blinkhart) consent to participate in the collaborative practice agreement between the pharmacist and referred patient. This is in lieu of paper consent due to COVID-19 precautions and the use of remote/virtual visits.        For Pharmacy Admin Tracking Only     Intervention Detail:    Total # of Interventions Recommended: 0   Total # of Interventions Accepted: 0   Time Spent (min): 15

## 2022-05-17 ENCOUNTER — HOSPITAL ENCOUNTER (OUTPATIENT)
Dept: PHARMACY | Age: 62
Setting detail: THERAPIES SERIES
Discharge: HOME | End: 2022-05-17
Payer: COMMERCIAL

## 2022-05-17 DIAGNOSIS — Z95.2 H/O MITRAL VALVE REPLACEMENT WITH MECHANICAL VALVE: Primary | ICD-10-CM

## 2022-05-17 LAB
INR BLD: 3.3
PROTIME: 39.2 SECONDS

## 2022-05-17 PROCEDURE — 85610 PROTHROMBIN TIME: CPT

## 2022-05-17 PROCEDURE — 99211 OFF/OP EST MAY X REQ PHY/QHP: CPT

## 2022-05-17 NOTE — PROGRESS NOTES
Patient seen in clinic for warfarin management due to mechanical mitral valve with an INR goal of 2.5-3.5. Estimated duration of therapy is indefinite. Patient states compliant all of the time with regimen. No bleeding or thromboembolic side effects noted. No significant med or dietary changes. No significant recent illness or disease state changes. Patient states he had no brussel sprouts recently. PT/INR done in office per protocol. INR is 3.3 which is therapeutic. Warfarin regimen will be continued at current dose of 2.5mg tues/fri and 5mg all other days. Will retest in 4 weeks. Patient understands dosing directions and information discussed. Dosing schedule and follow up appointment given to patient. Progress note routed to referring physicians office. Discussed with patient the Pharmacist Collaborative Practice Agreement. Patient provided verbal and/or electronic (ex. [employer]) consent to participate in the collaborative practice agreement between the pharmacist and referred patient. This is in lieu of paper consent due to COVID-19 precautions and the use of remote/virtual visits.        For Pharmacy Admin Tracking Only     Intervention Detail:    Total # of Interventions Recommended: 0   Total # of Interventions Accepted: 0   Time Spent (min): 15

## 2022-06-15 ENCOUNTER — HOSPITAL ENCOUNTER (OUTPATIENT)
Dept: PHARMACY | Age: 62
Setting detail: THERAPIES SERIES
Discharge: HOME | End: 2022-06-15
Payer: COMMERCIAL

## 2022-06-15 DIAGNOSIS — Z95.2 H/O MITRAL VALVE REPLACEMENT WITH MECHANICAL VALVE: Primary | ICD-10-CM

## 2022-06-15 LAB
INR BLD: 3
PROTIME: 36.6 SECONDS

## 2022-06-15 PROCEDURE — 85610 PROTHROMBIN TIME: CPT

## 2022-06-15 PROCEDURE — 99211 OFF/OP EST MAY X REQ PHY/QHP: CPT

## 2022-06-15 NOTE — PROGRESS NOTES
Patient seen in clinic for warfarin management due to mechanical mitral valve with an INR goal of 2.5-3.5. Estimated duration of therapy is indefinite. Patient states compliant all of the time with regimen. No bleeding or thromboembolic side effects noted. No significant med or dietary changes. No significant recent illness or disease state changes. PT/INR done in office per protocol. INR is 3.0 which is therapeutic. Warfarin regimen will be continued at current dose of 2.5mg Tue/Fri and 5mg all other days. Will retest in 4 weeks. Patient understands dosing directions and information discussed. Dosing schedule and follow up appointment given to patient. Progress note routed to referring physicians office. Discussed with patient the Pharmacist Collaborative Practice Agreement. Patient provided verbal and/or electronic (ex. [employer]) consent to participate in the collaborative practice agreement between the pharmacist and referred patient. This is in lieu of paper consent due to COVID-19 precautions and the use of remote/virtual visits.        For Pharmacy Admin Tracking Only     Intervention Detail:    Total # of Interventions Recommended: 0   Total # of Interventions Accepted: 0   Time Spent (min): 15

## 2022-07-13 ENCOUNTER — HOSPITAL ENCOUNTER (OUTPATIENT)
Dept: PHARMACY | Age: 62
Setting detail: THERAPIES SERIES
Discharge: HOME | End: 2022-07-13
Payer: COMMERCIAL

## 2022-07-13 DIAGNOSIS — Z95.2 H/O MITRAL VALVE REPLACEMENT WITH MECHANICAL VALVE: Primary | ICD-10-CM

## 2022-07-13 LAB
INR BLD: 2.7
PROTIME: 32.1 SECONDS

## 2022-07-13 PROCEDURE — 85610 PROTHROMBIN TIME: CPT

## 2022-07-13 PROCEDURE — 99211 OFF/OP EST MAY X REQ PHY/QHP: CPT

## 2022-07-13 NOTE — PROGRESS NOTES
Patient seen in clinic for warfarin management due to mechanical mitral valve with an INR goal of 2.5-3.5. Estimated duration of therapy is indefinite. Patient states compliant all of the time with regimen. No bleeding or thromboembolic side effects noted. No significant med or dietary changes. No significant recent illness or disease state changes. PT/INR done in office per protocol. INR is 2.7 which is therapeutic. Warfarin regimen will be continued at current dose of 2.5mg tues/fri and 5mg all other days. Will retest in 4 weeks. Patient understands dosing directions and information discussed. Dosing schedule and follow up appointment given to patient. Progress note routed to referring physicians office. Discussed with patient the Pharmacist Collaborative Practice Agreement. Patient provided verbal and/or electronic (ex. [employer]) consent to participate in the collaborative practice agreement between the pharmacist and referred patient. This is in lieu of paper consent due to COVID-19 precautions and the use of remote/virtual visits.        For Pharmacy Admin Tracking Only     Intervention Detail:    Total # of Interventions Recommended: 0   Total # of Interventions Accepted: 0   Time Spent (min): 15

## 2022-07-18 ENCOUNTER — HOSPITAL ENCOUNTER (OUTPATIENT)
Age: 62
Discharge: HOME | End: 2022-07-18
Payer: COMMERCIAL

## 2022-07-18 LAB
ALBUMIN SERPL-MCNC: 4.7 G/DL (ref 3.5–5.2)
ALBUMIN/GLOBULIN RATIO: 1.9 (ref 1–2.5)
ALP BLD-CCNC: 75 U/L (ref 40–129)
ALT SERPL-CCNC: 29 U/L (ref 5–41)
AST SERPL-CCNC: 22 U/L
BILIRUB SERPL-MCNC: 1.08 MG/DL (ref 0.3–1.2)
BILIRUBIN DIRECT: 0.21 MG/DL
BILIRUBIN, INDIRECT: 0.87 MG/DL (ref 0–1)
HCT VFR BLD CALC: 48.7 % (ref 40.7–50.3)
HEMOGLOBIN: 15.8 G/DL (ref 13–17)
MCH RBC QN AUTO: 29.3 PG (ref 25.2–33.5)
MCHC RBC AUTO-ENTMCNC: 32.4 G/DL (ref 28.4–34.8)
MCV RBC AUTO: 90.4 FL (ref 82.6–102.9)
NRBC AUTOMATED: 0 PER 100 WBC
PDW BLD-RTO: 13.2 % (ref 11.8–14.4)
PLATELET # BLD: NORMAL K/UL (ref 138–453)
PLATELET, FLUORESCENCE: 108 K/UL (ref 138–453)
PLATELET, IMMATURE FRACTION: 12.3 % (ref 1.1–10.3)
PROSTATE SPECIFIC ANTIGEN: 2.28 NG/ML
RBC # BLD: 5.39 M/UL (ref 4.21–5.77)
TOTAL PROTEIN: 7.2 G/DL (ref 6.4–8.3)
WBC # BLD: 5.9 K/UL (ref 3.5–11.3)

## 2022-07-18 PROCEDURE — 36415 COLL VENOUS BLD VENIPUNCTURE: CPT

## 2022-07-18 PROCEDURE — 80076 HEPATIC FUNCTION PANEL: CPT

## 2022-07-18 PROCEDURE — 84153 ASSAY OF PSA TOTAL: CPT

## 2022-07-18 PROCEDURE — 85027 COMPLETE CBC AUTOMATED: CPT

## 2022-07-18 PROCEDURE — 85055 RETICULATED PLATELET ASSAY: CPT

## 2022-08-01 ENCOUNTER — HOSPITAL ENCOUNTER (OUTPATIENT)
Age: 62
Discharge: HOME | End: 2022-08-01
Payer: COMMERCIAL

## 2022-08-01 LAB
HCT VFR BLD CALC: 46.7 % (ref 40.7–50.3)
HEMOGLOBIN: 15.2 G/DL (ref 13–17)
MCH RBC QN AUTO: 29.3 PG (ref 25.2–33.5)
MCHC RBC AUTO-ENTMCNC: 32.5 G/DL (ref 28.4–34.8)
MCV RBC AUTO: 90 FL (ref 82.6–102.9)
NRBC AUTOMATED: 0 PER 100 WBC
PDW BLD-RTO: 13.3 % (ref 11.8–14.4)
PLATELET # BLD: NORMAL K/UL (ref 138–453)
PLATELET, FLUORESCENCE: 105 K/UL (ref 138–453)
PLATELET, IMMATURE FRACTION: 10.8 % (ref 1.1–10.3)
RBC # BLD: 5.19 M/UL (ref 4.21–5.77)
SEX HORMONE BINDING GLOBULIN: 39 NMOL/L (ref 11–80)
TESTOSTERONE FREE-NONMALE: 28.7 PG/ML (ref 47–244)
TESTOSTERONE TOTAL: 169 NG/DL (ref 220–1000)
TESTOSTERONE, BIOAVAILABLE: 67.2 NG/DL (ref 130–680)
WBC # BLD: 5.7 K/UL (ref 3.5–11.3)

## 2022-08-01 PROCEDURE — 84270 ASSAY OF SEX HORMONE GLOBUL: CPT

## 2022-08-01 PROCEDURE — 36415 COLL VENOUS BLD VENIPUNCTURE: CPT

## 2022-08-01 PROCEDURE — 85027 COMPLETE CBC AUTOMATED: CPT

## 2022-08-01 PROCEDURE — 84403 ASSAY OF TOTAL TESTOSTERONE: CPT

## 2022-08-01 PROCEDURE — 85055 RETICULATED PLATELET ASSAY: CPT

## 2022-08-08 ENCOUNTER — HOSPITAL ENCOUNTER (OUTPATIENT)
Dept: PHARMACY | Age: 62
Setting detail: THERAPIES SERIES
Discharge: HOME | End: 2022-08-08
Payer: COMMERCIAL

## 2022-08-08 DIAGNOSIS — Z95.2 H/O MITRAL VALVE REPLACEMENT WITH MECHANICAL VALVE: Primary | ICD-10-CM

## 2022-08-08 LAB
INR BLD: 2.7
PROTIME: 32.1 SECONDS

## 2022-08-08 PROCEDURE — 85610 PROTHROMBIN TIME: CPT

## 2022-08-08 PROCEDURE — 99211 OFF/OP EST MAY X REQ PHY/QHP: CPT

## 2022-08-08 NOTE — PROGRESS NOTES
Patient seen in clinic for warfarin management due to mechanical mitral valve with an INR goal of 2.5-3.5. Estimated duration of therapy is indefinite. Patient states compliant all of the time with regimen. No bleeding or thromboembolic side effects noted. No significant dietary changes. No significant recent illness or disease state changes. Patient states he has been taking quercetin + vitamin C for the last 5 days or so. Per [facility] this can increase risk of bleeding and serum warfarin levels so I recommended he switch back to regular Vit C supplement. PT/INR done in office per protocol. INR is 2.7 which is therapeutic. Warfarin regimen will be continued at current dose of 2.5mg tues/fri and 5mg all other days. Will retest in 5 weeks. Patient understands dosing directions and information discussed. Dosing schedule and follow up appointment given to patient. Progress note routed to referring physicians office. Discussed with patient the Pharmacist Collaborative Practice Agreement. Patient provided verbal and/or electronic (ex. [employer]) consent to participate in the collaborative practice agreement between the pharmacist and referred patient. This is in lieu of paper consent due to COVID-19 precautions and the use of remote/virtual visits.        For Pharmacy Admin Tracking Only    Intervention Detail:   Total # of Interventions Recommended: 0  Total # of Interventions Accepted: 0  Time Spent (min): 15

## 2022-08-09 ENCOUNTER — APPOINTMENT (OUTPATIENT)
Dept: PHARMACY | Age: 62
End: 2022-08-09
Payer: COMMERCIAL

## 2022-09-06 ENCOUNTER — HOSPITAL ENCOUNTER (OUTPATIENT)
Age: 62
Discharge: HOME | End: 2022-09-06
Payer: COMMERCIAL

## 2022-09-06 LAB
HCT VFR BLD CALC: 45.7 % (ref 40.7–50.3)
HEMOGLOBIN: 15.4 G/DL (ref 13–17)
MCH RBC QN AUTO: 29.6 PG (ref 25.2–33.5)
MCHC RBC AUTO-ENTMCNC: 33.7 G/DL (ref 28.4–34.8)
MCV RBC AUTO: 87.9 FL (ref 82.6–102.9)
NRBC AUTOMATED: 0 PER 100 WBC
PDW BLD-RTO: 12.9 % (ref 11.8–14.4)
PLATELET # BLD: NORMAL K/UL (ref 138–453)
PLATELET, FLUORESCENCE: 113 K/UL (ref 138–453)
PLATELET, IMMATURE FRACTION: 11.9 % (ref 1.1–10.3)
RBC # BLD: 5.2 M/UL (ref 4.21–5.77)
SEX HORMONE BINDING GLOBULIN: 37 NMOL/L (ref 11–80)
TESTOSTERONE FREE-NONMALE: 101.1 PG/ML (ref 47–244)
TESTOSTERONE TOTAL: 514 NG/DL (ref 220–1000)
TESTOSTERONE, BIOAVAILABLE: 237 NG/DL (ref 130–680)
WBC # BLD: 5.4 K/UL (ref 3.5–11.3)

## 2022-09-06 PROCEDURE — 85055 RETICULATED PLATELET ASSAY: CPT

## 2022-09-06 PROCEDURE — 85027 COMPLETE CBC AUTOMATED: CPT

## 2022-09-06 PROCEDURE — 84403 ASSAY OF TOTAL TESTOSTERONE: CPT

## 2022-09-06 PROCEDURE — 84270 ASSAY OF SEX HORMONE GLOBUL: CPT

## 2022-09-06 PROCEDURE — 36415 COLL VENOUS BLD VENIPUNCTURE: CPT

## 2022-09-13 ENCOUNTER — HOSPITAL ENCOUNTER (OUTPATIENT)
Dept: PHARMACY | Age: 62
Setting detail: THERAPIES SERIES
Discharge: HOME | End: 2022-09-13
Payer: COMMERCIAL

## 2022-09-13 DIAGNOSIS — Z95.2 H/O MITRAL VALVE REPLACEMENT WITH MECHANICAL VALVE: Primary | ICD-10-CM

## 2022-09-13 LAB
INR BLD: 3.2
PROTIME: 38.2 SECONDS

## 2022-09-13 PROCEDURE — 99211 OFF/OP EST MAY X REQ PHY/QHP: CPT

## 2022-09-13 PROCEDURE — 85610 PROTHROMBIN TIME: CPT

## 2022-09-13 NOTE — PROGRESS NOTES
Patient seen in clinic for warfarin management due to mechanical mitral valve with an INR goal of 2.5-3.5. Estimated duration of therapy is indefinite. Patient states compliant all of the time with regimen. No bleeding or thromboembolic side effects noted. No significant med or dietary changes. No significant recent illness or disease state changes. PT/INR done in office per protocol. INR is 3.2 which is therapeutic. Warfarin regimen will be continued at current dose of 2.5 mg on Tues/Fri and 5 mg all other days. Will retest in 5 weeks. Patient understands dosing directions and information discussed. Dosing schedule and follow up appointment given to patient. Progress note routed to referring physicians office. Discussed with patient the Pharmacist Collaborative Practice Agreement. Patient provided verbal and/or electronic (ex. [employer]) consent to participate in the collaborative practice agreement between the pharmacist and referred patient. This is in lieu of paper consent due to COVID-19 precautions and the use of remote/virtual visits.        For Pharmacy Admin Tracking Only    Intervention Detail:   Total # of Interventions Recommended: 0  Total # of Interventions Accepted: 0  Time Spent (min): 15

## 2022-10-18 ENCOUNTER — HOSPITAL ENCOUNTER (OUTPATIENT)
Dept: PHARMACY | Age: 62
Setting detail: THERAPIES SERIES
Discharge: HOME | End: 2022-10-18
Payer: COMMERCIAL

## 2022-10-18 DIAGNOSIS — Z95.2 H/O MITRAL VALVE REPLACEMENT WITH MECHANICAL VALVE: Primary | ICD-10-CM

## 2022-10-18 LAB
INR BLD: 3.3
PROTIME: 39.1 SECONDS

## 2022-10-18 PROCEDURE — 99211 OFF/OP EST MAY X REQ PHY/QHP: CPT

## 2022-10-18 PROCEDURE — 85610 PROTHROMBIN TIME: CPT

## 2022-10-18 NOTE — PROGRESS NOTES
Patient seen in clinic for warfarin management due to mechanical mitral valve with an INR goal of 2.5-3.5. Estimated duration of therapy is indefinite. Patient states compliant all of the time with regimen. No bleeding or thromboembolic side effects noted. No significant med or dietary changes. No significant recent illness or disease state changes. PT/INR done in office per protocol. INR is 3.3 which is therapeutic. Warfarin regimen will be continued at current dose 2.5mg Tues/Fri, 5mg all other days. Will retest in 5 weeks. Patient understands dosing directions and information discussed. Dosing schedule and follow up appointment given to patient. Progress note routed to referring physicians office. Discussed with patient the Pharmacist Collaborative Practice Agreement. Patient provided verbal and/or electronic (ex. [employer]) consent to participate in the collaborative practice agreement between the pharmacist and referred patient. This is in lieu of paper consent due to COVID-19 precautions and the use of remote/virtual visits.        For Pharmacy Admin Tracking Only    Intervention Detail:   Total # of Interventions Recommended: 0  Total # of Interventions Accepted: 0  Time Spent (min): 15

## 2022-11-22 ENCOUNTER — HOSPITAL ENCOUNTER (OUTPATIENT)
Dept: PHARMACY | Age: 62
Setting detail: THERAPIES SERIES
Discharge: HOME | End: 2022-11-22
Payer: COMMERCIAL

## 2022-11-22 DIAGNOSIS — Z95.2 H/O MITRAL VALVE REPLACEMENT WITH MECHANICAL VALVE: Primary | ICD-10-CM

## 2022-11-22 LAB
INR BLD: 3.3
PROTIME: 39.5 SECONDS

## 2022-11-22 PROCEDURE — 85610 PROTHROMBIN TIME: CPT

## 2022-11-22 PROCEDURE — 99211 OFF/OP EST MAY X REQ PHY/QHP: CPT

## 2022-11-22 NOTE — PROGRESS NOTES
Patient seen in clinic for warfarin management due to mechanical mitral valve with an INR goal of 2.5-3.5. Estimated duration of therapy is indefinite. Patient states compliant all of the time with regimen. No bleeding or thromboembolic side effects noted. No significant med or dietary changes. No significant recent illness or disease state changes. PT/INR done in office per protocol. INR is 3.3 which is therapeutic. Warfarin regimen will be continued at current dose 2.5mg Tue/Fri, and 5mg on all other days. Will retest in 5 weeks. Patient understands dosing directions and information discussed. Dosing schedule and follow up appointment given to patient. Progress note routed to referring physicians office. Discussed with patient the Pharmacist Collaborative Practice Agreement. Patient provided verbal and/or electronic (ex. [employer]) consent to participate in the collaborative practice agreement between the pharmacist and referred patient. This is in lieu of paper consent due to COVID-19 precautions and the use of remote/virtual visits.        For Pharmacy Admin Tracking Only    Intervention Detail:   Total # of Interventions Recommended: 0  Total # of Interventions Accepted: 0  Time Spent (min): 15

## 2023-01-03 ENCOUNTER — HOSPITAL ENCOUNTER (OUTPATIENT)
Dept: PHARMACY | Age: 63
Setting detail: THERAPIES SERIES
Discharge: HOME | End: 2023-01-03
Payer: COMMERCIAL

## 2023-01-03 DIAGNOSIS — Z95.2 H/O MITRAL VALVE REPLACEMENT WITH MECHANICAL VALVE: Primary | ICD-10-CM

## 2023-01-03 LAB
INR BLD: 2.7
PROTIME: 32.7 SECONDS

## 2023-01-03 PROCEDURE — 85610 PROTHROMBIN TIME: CPT

## 2023-01-03 PROCEDURE — 99211 OFF/OP EST MAY X REQ PHY/QHP: CPT

## 2023-01-03 NOTE — PROGRESS NOTES
Patient seen in clinic for warfarin management due to mechanical mitral valve with an INR goal of 2.5-3.5. Estimated duration of therapy is indefinite. Patient states compliant all of the time with regimen. No bleeding or thromboembolic side effects noted. No significant med or dietary changes. No significant recent illness or disease state changes. PT/INR done in office per protocol. INR is 2.7 which is therapeutic. Warfarin regimen will be continued at current dose 2.5mg Tues/Fri, 5mg all other days. Will retest in 5 weeks. Patient understands dosing directions and information discussed. Dosing schedule and follow up appointment given to patient. Progress note routed to referring physicians office. Discussed with patient the Pharmacist Collaborative Practice Agreement. Patient provided verbal and/or electronic (ex. [employer]) consent to participate in the collaborative practice agreement between the pharmacist and referred patient. This is in lieu of paper consent due to COVID-19 precautions and the use of remote/virtual visits.        For Pharmacy Admin Tracking Only    Intervention Detail:   Total # of Interventions Recommended: 0  Total # of Interventions Accepted: 0  Time Spent (min): 15

## 2023-01-18 ENCOUNTER — HOSPITAL ENCOUNTER (OUTPATIENT)
Age: 63
Discharge: HOME | End: 2023-01-18
Payer: COMMERCIAL

## 2023-01-18 LAB
ALBUMIN SERPL-MCNC: 4.2 G/DL (ref 3.5–5.2)
ALBUMIN/GLOBULIN RATIO: 1.5 (ref 1–2.5)
ALP BLD-CCNC: 76 U/L (ref 40–129)
ALT SERPL-CCNC: 44 U/L (ref 5–41)
AST SERPL-CCNC: 30 U/L
BILIRUB SERPL-MCNC: 0.9 MG/DL (ref 0.3–1.2)
BILIRUBIN DIRECT: 0.2 MG/DL
BILIRUBIN, INDIRECT: 0.7 MG/DL (ref 0–1)
HCT VFR BLD CALC: 46.6 % (ref 40.7–50.3)
HEMOGLOBIN: 15.3 G/DL (ref 13–17)
MCH RBC QN AUTO: 29.1 PG (ref 25.2–33.5)
MCHC RBC AUTO-ENTMCNC: 32.8 G/DL (ref 28.4–34.8)
MCV RBC AUTO: 88.8 FL (ref 82.6–102.9)
NRBC AUTOMATED: 0 PER 100 WBC
PDW BLD-RTO: 12.8 % (ref 11.8–14.4)
PLATELET # BLD: NORMAL K/UL (ref 138–453)
PLATELET, FLUORESCENCE: 111 K/UL (ref 138–453)
PLATELET, IMMATURE FRACTION: 15.4 % (ref 1.1–10.3)
PROSTATE SPECIFIC ANTIGEN: 1.92 NG/ML
RBC # BLD: 5.25 M/UL (ref 4.21–5.77)
TOTAL PROTEIN: 7 G/DL (ref 6.4–8.3)
WBC # BLD: 5 K/UL (ref 3.5–11.3)

## 2023-01-18 PROCEDURE — 36415 COLL VENOUS BLD VENIPUNCTURE: CPT

## 2023-01-18 PROCEDURE — 85027 COMPLETE CBC AUTOMATED: CPT

## 2023-01-18 PROCEDURE — 84153 ASSAY OF PSA TOTAL: CPT

## 2023-01-18 PROCEDURE — 85055 RETICULATED PLATELET ASSAY: CPT

## 2023-01-18 PROCEDURE — 80076 HEPATIC FUNCTION PANEL: CPT

## 2023-01-30 ENCOUNTER — HOSPITAL ENCOUNTER (OUTPATIENT)
Age: 63
Setting detail: SPECIMEN
Discharge: HOME | End: 2023-01-30
Payer: COMMERCIAL

## 2023-01-30 LAB
HCT VFR BLD CALC: 44.8 % (ref 40.7–50.3)
HEMOGLOBIN: 14.8 G/DL (ref 13–17)
MCH RBC QN AUTO: 29.1 PG (ref 25.2–33.5)
MCHC RBC AUTO-ENTMCNC: 33 G/DL (ref 28.4–34.8)
MCV RBC AUTO: 88.2 FL (ref 82.6–102.9)
NRBC AUTOMATED: 0 PER 100 WBC
PDW BLD-RTO: 12.9 % (ref 11.8–14.4)
PLATELET # BLD: NORMAL K/UL (ref 138–453)
PLATELET, FLUORESCENCE: 102 K/UL (ref 138–453)
PLATELET, IMMATURE FRACTION: 14.6 % (ref 1.1–10.3)
RBC # BLD: 5.08 M/UL (ref 4.21–5.77)
SEX HORMONE BINDING GLOBULIN: 49 NMOL/L (ref 11–80)
TESTOSTERONE FREE-NONMALE: 15.3 PG/ML (ref 47–244)
TESTOSTERONE TOTAL: 107 NG/DL (ref 220–1000)
TESTOSTERONE, BIOAVAILABLE: 35.8 NG/DL (ref 130–680)
WBC # BLD: 6.2 K/UL (ref 3.5–11.3)

## 2023-01-30 PROCEDURE — 36415 COLL VENOUS BLD VENIPUNCTURE: CPT

## 2023-01-30 PROCEDURE — 85027 COMPLETE CBC AUTOMATED: CPT

## 2023-01-30 PROCEDURE — 84403 ASSAY OF TOTAL TESTOSTERONE: CPT

## 2023-01-30 PROCEDURE — 84270 ASSAY OF SEX HORMONE GLOBUL: CPT

## 2023-01-30 PROCEDURE — 85055 RETICULATED PLATELET ASSAY: CPT

## 2023-02-07 ENCOUNTER — HOSPITAL ENCOUNTER (OUTPATIENT)
Dept: PHARMACY | Age: 63
Setting detail: THERAPIES SERIES
Discharge: HOME | End: 2023-02-07
Payer: COMMERCIAL

## 2023-02-07 DIAGNOSIS — Z95.2 H/O MITRAL VALVE REPLACEMENT WITH MECHANICAL VALVE: Primary | ICD-10-CM

## 2023-02-07 LAB
INR BLD: 3.1
PROTIME: 37.5 SECONDS

## 2023-02-07 PROCEDURE — 85610 PROTHROMBIN TIME: CPT

## 2023-02-07 PROCEDURE — 99211 OFF/OP EST MAY X REQ PHY/QHP: CPT

## 2023-02-07 NOTE — PROGRESS NOTES
Patient seen in clinic for warfarin management due to mechanical mitral valve with an INR goal of 2.5-3.5. Estimated duration of therapy is indefinite. Patient states compliant all of the time with regimen. No bleeding or thromboembolic side effects noted. No significant med or dietary changes. No significant recent illness or disease state changes. PT/INR done in office per protocol. INR is 3.1 which is therapeutic. Warfarin regimen will be continued at current dose of 2.5 mg every Tue, Fri and 5 mg all other days  Will retest in 5 weeks. Patient understands dosing directions and information discussed. Dosing schedule and follow up appointment given to patient. Progress note routed to referring physicians office. Discussed with patient the Pharmacist Collaborative Practice Agreement. Patient provided verbal and/or electronic (ex. [employer]) consent to participate in the collaborative practice agreement between the pharmacist and referred patient. This is in lieu of paper consent due to COVID-19 precautions and the use of remote/virtual visits.        For Pharmacy Admin Tracking Only    Intervention Detail:   Total # of Interventions Recommended: 0  Total # of Interventions Accepted: 0  Time Spent (min): 15

## 2023-03-06 DIAGNOSIS — Z95.2 H/O MITRAL VALVE REPLACEMENT WITH MECHANICAL VALVE: Primary | ICD-10-CM

## 2023-03-06 RX ORDER — WARFARIN SODIUM 5 MG/1
5 TABLET ORAL DAILY
Qty: 90 TABLET | Refills: 2 | Status: SHIPPED | OUTPATIENT
Start: 2023-03-06

## 2023-03-06 NOTE — TELEPHONE ENCOUNTER
Patient called requesting refill be sent to [address]    Intervention Detail: Refill(s) Provided  Total # of Interventions Recommended: 1  Total # of Interventions Accepted: 1  Time Spent (min): 10

## 2023-03-14 ENCOUNTER — HOSPITAL ENCOUNTER (OUTPATIENT)
Dept: PHARMACY | Age: 63
Setting detail: THERAPIES SERIES
Discharge: HOME | End: 2023-03-14
Payer: COMMERCIAL

## 2023-03-14 DIAGNOSIS — Z95.2 H/O MITRAL VALVE REPLACEMENT WITH MECHANICAL VALVE: Primary | ICD-10-CM

## 2023-03-14 LAB
INR BLD: 3.2
PROTIME: 38 SECONDS

## 2023-03-14 PROCEDURE — 99211 OFF/OP EST MAY X REQ PHY/QHP: CPT

## 2023-03-14 PROCEDURE — 85610 PROTHROMBIN TIME: CPT

## 2023-03-14 NOTE — PROGRESS NOTES
Patient seen in clinic for warfarin management due to mechanical mitral valve with an INR goal of 2.5-3.5. Estimated duration of therapy is indefinite. Patient states compliant all of the time with regimen. No bleeding or thromboembolic side effects noted. No significant med or dietary changes. No significant recent illness or disease state changes. PT/INR done in office per protocol. INR is 3.2 which is therapeutic. Warfarin regimen will be continued at current dose 2.5mg Tues/Fri, 5mg all other days. Will retest in 5 weeks. Patient understands dosing directions and information discussed. Dosing schedule and follow up appointment given to patient. Progress note routed to referring physicians office. Discussed with patient the Pharmacist Collaborative Practice Agreement. Patient provided verbal and/or electronic (ex. [employer]) consent to participate in the collaborative practice agreement between the pharmacist and referred patient. This is in lieu of paper consent due to COVID-19 precautions and the use of remote/virtual visits.        For Pharmacy Admin Tracking Only    Intervention Detail:   Total # of Interventions Recommended: 0  Total # of Interventions Accepted: 0  Time Spent (min): 15

## 2023-04-18 ENCOUNTER — HOSPITAL ENCOUNTER (OUTPATIENT)
Dept: PHARMACY | Age: 63
Setting detail: THERAPIES SERIES
Discharge: HOME | End: 2023-04-18
Payer: COMMERCIAL

## 2023-04-18 DIAGNOSIS — Z95.2 H/O MITRAL VALVE REPLACEMENT WITH MECHANICAL VALVE: Primary | ICD-10-CM

## 2023-04-18 LAB
INR BLD: 2.4
PROTIME: 28.9 SECONDS

## 2023-04-18 PROCEDURE — 99211 OFF/OP EST MAY X REQ PHY/QHP: CPT

## 2023-04-18 PROCEDURE — 85610 PROTHROMBIN TIME: CPT

## 2023-04-18 NOTE — PROGRESS NOTES
Patient seen in clinic for warfarin management due to mechanical mitral valve with an INR goal of 2.5-3.5. Estimated duration of therapy is indefinite. Patient states compliant all of the time with regimen. No bleeding or thromboembolic side effects noted. No significant med changes. Patient had more broccoli than usual this past weekend. No significant recent illness or disease state changes. PT/INR done in office per protocol. INR is 2.4 which is just below goal.     Warfarin regimen will be continued at current dose 2.5 mg every Tue, Fri; 5 mg all other days. Will retest in 5 weeks. Patient understands dosing directions and information discussed. Dosing schedule and follow up appointment given to patient. Progress note routed to referring physicians office. Discussed with patient the Pharmacist Collaborative Practice Agreement. Patient provided verbal and/or electronic (ex. [employer]) consent to participate in the collaborative practice agreement between the pharmacist and referred patient. This is in lieu of paper consent due to COVID-19 precautions and the use of remote/virtual visits.        For Pharmacy Admin Tracking Only    Intervention Detail:   Total # of Interventions Recommended: 0  Total # of Interventions Accepted: 0  Time Spent (min): 15

## 2023-05-23 ENCOUNTER — HOSPITAL ENCOUNTER (OUTPATIENT)
Dept: PHARMACY | Age: 63
Setting detail: THERAPIES SERIES
Discharge: HOME | End: 2023-05-23
Payer: COMMERCIAL

## 2023-05-23 DIAGNOSIS — Z95.2 H/O MITRAL VALVE REPLACEMENT WITH MECHANICAL VALVE: Primary | ICD-10-CM

## 2023-05-23 LAB
INR BLD: 2.9
PROTIME: 34.4 SECONDS

## 2023-05-23 PROCEDURE — 85610 PROTHROMBIN TIME: CPT

## 2023-05-23 PROCEDURE — 99211 OFF/OP EST MAY X REQ PHY/QHP: CPT

## 2023-05-23 NOTE — PROGRESS NOTES
Patient seen in clinic for warfarin management due to mitral valve replacement with an INR goal of 2.5-3.5. Estimated duration of therapy is indefinite. Patient states compliant all of the time with regimen. No bleeding or thromboembolic side effects noted. No significant med or dietary changes. No significant recent illness or disease state changes. PT/INR done in office per protocol. INR is 2.9 which is therapeutic. Warfarin regimen will be continued at current dose 2.5mg Tues/Fri, 5mg all other days. Will retest in 5 weeks. Patient understands dosing directions and information discussed. Dosing schedule and follow up appointment given to patient. Progress note routed to referring physicians office. Discussed with patient the Pharmacist Collaborative Practice Agreement. Patient provided verbal and/or electronic (ex. [employer]) consent to participate in the collaborative practice agreement between the pharmacist and referred patient. This is in lieu of paper consent due to COVID-19 precautions and the use of remote/virtual visits.        For Pharmacy Admin Tracking Only    Intervention Detail:   Total # of Interventions Recommended: 0  Total # of Interventions Accepted: 0  Time Spent (min): 15

## 2023-05-25 ENCOUNTER — OFFICE VISIT (OUTPATIENT)
Dept: NEUROLOGY | Age: 63
End: 2023-05-25
Payer: COMMERCIAL

## 2023-05-25 VITALS
DIASTOLIC BLOOD PRESSURE: 102 MMHG | HEART RATE: 67 BPM | SYSTOLIC BLOOD PRESSURE: 162 MMHG | WEIGHT: 227 LBS | HEIGHT: 69 IN | BODY MASS INDEX: 33.62 KG/M2

## 2023-05-25 DIAGNOSIS — I67.9 CEREBRAL VASCULAR DISEASE: ICD-10-CM

## 2023-05-25 DIAGNOSIS — M54.16 LUMBAR RADICULOPATHY: ICD-10-CM

## 2023-05-25 DIAGNOSIS — I68.0 CEREBRAL AMYLOID ANGIOPATHY (CODE): Primary | ICD-10-CM

## 2023-05-25 PROCEDURE — 3017F COLORECTAL CA SCREEN DOC REV: CPT | Performed by: NURSE PRACTITIONER

## 2023-05-25 PROCEDURE — 99214 OFFICE O/P EST MOD 30 MIN: CPT | Performed by: NURSE PRACTITIONER

## 2023-05-25 PROCEDURE — G8427 DOCREV CUR MEDS BY ELIG CLIN: HCPCS | Performed by: NURSE PRACTITIONER

## 2023-05-25 PROCEDURE — 1036F TOBACCO NON-USER: CPT | Performed by: NURSE PRACTITIONER

## 2023-05-25 PROCEDURE — G8417 CALC BMI ABV UP PARAM F/U: HCPCS | Performed by: NURSE PRACTITIONER

## 2023-05-25 NOTE — PROGRESS NOTES
to follow with cardiology and has mechanical heart valve stopping anticoagulation. Continue Coumadin, as prescribed by cardiology   Patient advised to go to emergency if any new onset headaches. MRI brain WO contrast ordered today due to concern of cerebral amyloid angiopathy   Chronic bilateral multi-level radiculopathy affecting at least the L2-L3 to S1 level and appears to be greatest in the L4-L5 level. The patient has diminished vibration and temperature sensation bilaterally, right worse than left, which likely indicates a lumbar etiology for his BLE numbness. A previously normal TSH, B12, NICOLÁS, and hemoglobin A1c  The patient obtained an MRI of the lumbar spine in July 2021 without contrast which showed evidence of mild L4-L5 degenerative disc disease with mild L4-L5 spinal canal stenosis and mild bilateral L4-L5 and L5-S1 neural foraminal stenosis. Orthostatic hypotension without lightheadedness however when he becomes hypotensive he will develop fatigue  Continue to follow with cardiology who prescribe midodrine  Continue to increase electrolyte and water intake, recommended at least 60 ounces of water on a daily basis. Essential tremor, affecting the right hand. Patient displays no parkinsonian symptoms at today's visit. Discussed possible medication treatments with patient today who declines at this time. I advised him to contact the office for a sooner appointment if his tremor begins to worsen at which point I would recommend starting primidone. Propranolol is relatively contraindicated due to his orthostatic hypotension.    Return for yearly evaluation with Dr. Paulie Asif or sooner if his MRI returns abnormal             Signed: Naina Mejias CNP      *Please note that portions of this note were completed with a voice recognition program.  Although every effort was made to insure the accuracy of this automated transcription, some errors in transcription may have occurred, occasionally words and

## 2023-06-27 ENCOUNTER — HOSPITAL ENCOUNTER (OUTPATIENT)
Dept: PHARMACY | Age: 63
Setting detail: THERAPIES SERIES
Discharge: HOME | End: 2023-06-27
Payer: COMMERCIAL

## 2023-06-27 DIAGNOSIS — Z95.2 H/O MITRAL VALVE REPLACEMENT WITH MECHANICAL VALVE: Primary | ICD-10-CM

## 2023-06-27 LAB
INR BLD: 3.2
PROTIME: 38.1 SECONDS

## 2023-06-27 PROCEDURE — 99211 OFF/OP EST MAY X REQ PHY/QHP: CPT

## 2023-06-27 PROCEDURE — 85610 PROTHROMBIN TIME: CPT

## 2023-07-05 ENCOUNTER — HOSPITAL ENCOUNTER (OUTPATIENT)
Dept: INTERVENTIONAL RADIOLOGY/VASCULAR | Age: 63
Discharge: HOME | End: 2023-07-07
Payer: COMMERCIAL

## 2023-07-05 ENCOUNTER — HOSPITAL ENCOUNTER (OUTPATIENT)
Dept: MRI IMAGING | Age: 63
Discharge: HOME | End: 2023-07-07
Payer: COMMERCIAL

## 2023-07-05 DIAGNOSIS — I68.0 CEREBRAL AMYLOID ANGIOPATHY (CODE): ICD-10-CM

## 2023-07-05 PROCEDURE — 70551 MRI BRAIN STEM W/O DYE: CPT

## 2023-07-13 ENCOUNTER — TELEPHONE (OUTPATIENT)
Dept: NEUROLOGY | Age: 63
End: 2023-07-13

## 2023-07-13 NOTE — TELEPHONE ENCOUNTER
MRI brain shows chronic changes, which appear to be unchanged from 2021. We can discuss in more details when patient comes to the clinic.   Thank

## 2023-07-13 NOTE — TELEPHONE ENCOUNTER
This is a previous Robet Flaming pt, and he was calling requesting the results of the MRI he'd had done on 7/5/2023. The patient is set to follow with you. Please advise.

## 2023-08-02 ENCOUNTER — HOSPITAL ENCOUNTER (OUTPATIENT)
Dept: PHARMACY | Age: 63
Setting detail: THERAPIES SERIES
Discharge: HOME | End: 2023-08-02
Payer: COMMERCIAL

## 2023-08-02 DIAGNOSIS — Z95.2 H/O MITRAL VALVE REPLACEMENT WITH MECHANICAL VALVE: Primary | ICD-10-CM

## 2023-08-02 LAB
INR BLD: 2.9
PROTIME: 34.8 SECONDS

## 2023-08-02 PROCEDURE — 85610 PROTHROMBIN TIME: CPT

## 2023-08-02 PROCEDURE — 99211 OFF/OP EST MAY X REQ PHY/QHP: CPT

## 2023-08-02 NOTE — PROGRESS NOTES
Patient seen in clinic for warfarin management due to mechanical mitral valve with an INR goal of 2.5-3.5. Estimated duration of therapy is indefinite. Patient states compliant all of the time with regimen. No bleeding or thromboembolic side effects noted. No significant med or dietary changes. No significant recent illness or disease state changes. PT/INR done in office per protocol. INR is 2.9 which is therapeutic. Warfarin regimen will be continued at current dose 2.5mg Tues/Fri, 5mg all other days. Will retest in 6 weeks. Patient understands dosing directions and information discussed. Dosing schedule and follow up appointment given to patient. Progress note routed to referring physicians office. Discussed with patient the Pharmacist Collaborative Practice Agreement. Patient provided verbal and/or electronic (ex. [employer]) consent to participate in the collaborative practice agreement between the pharmacist and referred patient. This is in lieu of paper consent due to COVID-19 precautions and the use of remote/virtual visits.        For Pharmacy Admin Tracking Only    Intervention Detail:   Total # of Interventions Recommended: 0  Total # of Interventions Accepted: 0  Time Spent (min): 15

## 2023-08-17 ENCOUNTER — HOSPITAL ENCOUNTER (OUTPATIENT)
Age: 63
Discharge: HOME | End: 2023-08-17
Payer: COMMERCIAL

## 2023-08-17 LAB
CHOLEST SERPL-MCNC: 162 MG/DL
CHOLESTEROL/HDL RATIO: 3.5
HDLC SERPL-MCNC: 46 MG/DL
LDLC SERPL CALC-MCNC: 99 MG/DL (ref 0–130)
TRIGL SERPL-MCNC: 87 MG/DL

## 2023-08-17 PROCEDURE — 36415 COLL VENOUS BLD VENIPUNCTURE: CPT

## 2023-08-17 PROCEDURE — 80061 LIPID PANEL: CPT

## 2023-09-12 ENCOUNTER — HOSPITAL ENCOUNTER (OUTPATIENT)
Dept: PHARMACY | Age: 63
Setting detail: THERAPIES SERIES
Discharge: HOME | End: 2023-09-12
Payer: COMMERCIAL

## 2023-09-12 DIAGNOSIS — Z95.2 H/O MITRAL VALVE REPLACEMENT WITH MECHANICAL VALVE: Primary | ICD-10-CM

## 2023-09-12 LAB
INR BLD: 2.6
PROTIME: 30.7 SECONDS

## 2023-09-12 PROCEDURE — 99211 OFF/OP EST MAY X REQ PHY/QHP: CPT

## 2023-09-12 PROCEDURE — 85610 PROTHROMBIN TIME: CPT

## 2023-09-12 NOTE — PROGRESS NOTES
Patient seen in clinic for warfarin management due to mechanical mitral valve with an INR goal of 2.5-3.5. Estimated duration of therapy is indefinite. Patient states compliant all of the time with regimen. No bleeding or thromboembolic side effects noted. No significant med or dietary changes. No significant recent illness or disease state changes. PT/INR done in office per protocol. INR is 2.6 which is therapeutic. Warfarin regimen will be continued at current dose 2.5mg Tue/Fri, 5mg all other days. Will retest in 6 weeks. Patient understands dosing directions and information discussed. Dosing schedule and follow up appointment given to patient. Progress note routed to referring physicians office. Discussed with patient the Pharmacist Collaborative Practice Agreement. Patient provided verbal and/or electronic (ex. [employer]) consent to participate in the collaborative practice agreement between the pharmacist and referred patient. This is in lieu of paper consent due to COVID-19 precautions and the use of remote/virtual visits.        For Pharmacy Admin Tracking Only    Intervention Detail:   Total # of Interventions Recommended: 0  Total # of Interventions Accepted: 0  Time Spent (min): 15

## 2023-10-24 ENCOUNTER — HOSPITAL ENCOUNTER (OUTPATIENT)
Dept: PHARMACY | Age: 63
Setting detail: THERAPIES SERIES
Discharge: HOME | End: 2023-10-24
Payer: COMMERCIAL

## 2023-10-24 DIAGNOSIS — Z95.2 H/O MITRAL VALVE REPLACEMENT WITH MECHANICAL VALVE: Primary | ICD-10-CM

## 2023-10-24 LAB
INR BLD: 3.1
PROTIME: 36.9 SECONDS

## 2023-10-24 PROCEDURE — 85610 PROTHROMBIN TIME: CPT

## 2023-10-24 PROCEDURE — 99211 OFF/OP EST MAY X REQ PHY/QHP: CPT

## 2023-10-24 NOTE — PROGRESS NOTES
Patient seen in clinic for warfarin management due to mechanical mitral valve with an INR goal of 2.5-3.5. Estimated duration of therapy is indefinite. Patient states compliant all of the time with regimen. No bleeding or thromboembolic side effects noted. No significant med or dietary changes. No significant recent illness or disease state changes. PT/INR done in office per protocol. INR is 3.1 which is therapeutic. Warfarin regimen will be continued at current dose 2.5mg every Tue and Fri with 5mg all other days. Will retest in 6 weeks. Patient understands dosing directions and information discussed. Dosing schedule and follow up appointment given to patient. Progress note routed to referring physicians office. Discussed with patient the Pharmacist Collaborative Practice Agreement. Patient provided verbal and/or electronic (ex. [employer]) consent to participate in the collaborative practice agreement between the pharmacist and referred patient. This is in lieu of paper consent due to COVID-19 precautions and the use of remote/virtual visits.        For Pharmacy Admin Tracking Only    Intervention Detail:   Total # of Interventions Recommended: 0  Total # of Interventions Accepted: 0  Time Spent (min): 15

## 2023-12-05 ENCOUNTER — HOSPITAL ENCOUNTER (OUTPATIENT)
Dept: PHARMACY | Age: 63
Setting detail: THERAPIES SERIES
Discharge: HOME | End: 2023-12-05
Payer: COMMERCIAL

## 2023-12-05 DIAGNOSIS — Z95.2 H/O MITRAL VALVE REPLACEMENT WITH MECHANICAL VALVE: Primary | ICD-10-CM

## 2023-12-05 LAB
INR BLD: 3.8
PROTIME: 45.4 SECONDS

## 2023-12-05 PROCEDURE — 99212 OFFICE O/P EST SF 10 MIN: CPT

## 2023-12-05 PROCEDURE — 85610 PROTHROMBIN TIME: CPT

## 2023-12-05 NOTE — PROGRESS NOTES
Patient seen in clinic for warfarin management due to mechanical mitral valve with an INR goal of 2.5-3.5. Estimated duration of therapy is indefinite. Patient states compliant all of the time with regimen. No bleeding or thromboembolic side effects noted. No significant med changes. No significant recent illness or disease state changes. He reports maybe eating less greens than usual this past week. PT/INR done in office per protocol. INR is 3.8 which is supratherapeutic. Warfarin regimen will be 2.5mg tomorrow, then resume usual regimen 2.5mg Tues/Fri, 5mg all other days. Will retest in 3 weeks. Patient understands dosing directions and information discussed. Dosing schedule and follow up appointment given to patient. Progress note routed to referring physicians office. Discussed with patient the Pharmacist Collaborative Practice Agreement. Patient provided verbal and/or electronic (ex. [employer]) consent to participate in the collaborative practice agreement between the pharmacist and referred patient. This is in lieu of paper consent due to COVID-19 precautions and the use of remote/virtual visits.        For Pharmacy Admin Tracking Only    Intervention Detail: Dose Adjustment: 1, reason: Therapy De-escalation  Total # of Interventions Recommended: 1  Total # of Interventions Accepted: 1  Time Spent (min): 15

## 2024-01-02 ENCOUNTER — HOSPITAL ENCOUNTER (OUTPATIENT)
Dept: PHARMACY | Age: 64
Setting detail: THERAPIES SERIES
Discharge: HOME | End: 2024-01-02
Payer: COMMERCIAL

## 2024-01-02 DIAGNOSIS — Z95.2 H/O MITRAL VALVE REPLACEMENT WITH MECHANICAL VALVE: Primary | ICD-10-CM

## 2024-01-02 LAB
INR BLD: 2.9
PROTIME: 34.3 SECONDS

## 2024-01-02 PROCEDURE — 85610 PROTHROMBIN TIME: CPT

## 2024-01-02 PROCEDURE — 99211 OFF/OP EST MAY X REQ PHY/QHP: CPT

## 2024-01-02 NOTE — PROGRESS NOTES
Patient seen in clinic for warfarin management due to mechanical mitral valve with an INR goal of 2.5-3.5.  Estimated duration of therapy is indefinite.     Patient states compliant all of the time with regimen.  No bleeding or thromboembolic side effects noted.  No significant med or dietary changes.  No significant recent illness or disease state changes.      PT/INR done in office per protocol.  INR is 2.9 which is therapeutic.     Warfarin regimen will be continued at current dose 2.5mg Tues & Fri, 5mg all other days.  Will retest in 6 weeks.    Patient understands dosing directions and information discussed. Dosing schedule and follow up appointment given to patient.   Progress note routed to referring physicians office. Discussed with patient the Pharmacist Collaborative Practice Agreement.  Patient provided verbal and/or electronic (ex. Clzbyhart) consent to participate in the collaborative practice agreement between the pharmacist and referred patient. This is in lieu of paper consent due to COVID-19 precautions and the use of remote/virtual visits.       For Pharmacy Admin Tracking Only    Intervention Detail:   Total # of Interventions Recommended: 0  Total # of Interventions Accepted: 0  Time Spent (min): 15

## 2024-02-13 ENCOUNTER — HOSPITAL ENCOUNTER (OUTPATIENT)
Dept: PHARMACY | Age: 64
Setting detail: THERAPIES SERIES
Discharge: HOME | End: 2024-02-13
Payer: COMMERCIAL

## 2024-02-13 DIAGNOSIS — Z95.2 H/O MITRAL VALVE REPLACEMENT WITH MECHANICAL VALVE: Primary | ICD-10-CM

## 2024-02-13 LAB
INR BLD: 3.3
PROTIME: 40.1 SECONDS

## 2024-02-13 PROCEDURE — 85610 PROTHROMBIN TIME: CPT

## 2024-02-13 PROCEDURE — 99211 OFF/OP EST MAY X REQ PHY/QHP: CPT

## 2024-02-13 NOTE — PROGRESS NOTES
Patient seen in clinic for warfarin management due to mechanical mitral valve with an INR goal of 2.5-3.5.  Estimated duration of therapy is indefinite.     Patient states he is not sure if he took 5mg last Tuesday instead of the instructed 2.5mg, but was otherwise compliant with his regimen.  No bleeding or thromboembolic side effects noted.  No significant med or dietary changes.  No significant recent illness or disease state changes.      PT/INR done in office per protocol.  INR is 3.3 which is therapeutic.     Warfarin regimen will be continued at current dose 2.5mg Tues/Fri, 5mg all other days.  Will retest in 6 weeks.    Patient understands dosing directions and information discussed. Dosing schedule and follow up appointment given to patient.   Progress note routed to referring physicians office. Discussed with patient the Pharmacist Collaborative Practice Agreement.  Patient provided verbal and/or electronic (ex. Ecwidhart) consent to participate in the collaborative practice agreement between the pharmacist and referred patient.     For Pharmacy Admin Tracking Only    Intervention Detail:   Total # of Interventions Recommended: 0  Total # of Interventions Accepted: 0  Time Spent (min): 15

## 2024-02-19 DIAGNOSIS — Z95.2 H/O MITRAL VALVE REPLACEMENT WITH MECHANICAL VALVE: Primary | ICD-10-CM

## 2024-02-19 RX ORDER — WARFARIN SODIUM 5 MG/1
5 TABLET ORAL DAILY
Qty: 90 TABLET | Refills: 2 | Status: SHIPPED | OUTPATIENT
Start: 2024-02-19

## 2024-02-19 NOTE — TELEPHONE ENCOUNTER
Refill sent to [facility] per patient request.  For Pharmacy Admin Tracking Only    Intervention Detail: Refill(s) Provided  Total # of Interventions Recommended: 1  Total # of Interventions Accepted: 1  Time Spent (min): 5

## 2024-04-02 ENCOUNTER — HOSPITAL ENCOUNTER (OUTPATIENT)
Dept: PHARMACY | Age: 64
Setting detail: THERAPIES SERIES
Discharge: HOME | End: 2024-04-02

## 2024-04-02 DIAGNOSIS — Z95.2 H/O MITRAL VALVE REPLACEMENT WITH MECHANICAL VALVE: Primary | ICD-10-CM

## 2024-04-02 LAB
INR BLD: 3.1
PROTIME: 36.9 SECONDS

## 2024-04-02 PROCEDURE — 85610 PROTHROMBIN TIME: CPT

## 2024-04-02 PROCEDURE — 99211 OFF/OP EST MAY X REQ PHY/QHP: CPT

## 2024-04-02 NOTE — PROGRESS NOTES
Patient seen in clinic for warfarin management due to mechanical mitral valve with an INR goal of 2.5-3.5.  Estimated duration of therapy is indefinite.     Patient states compliant all of the time with regimen.  No bleeding or thromboembolic side effects noted.  No significant med or dietary changes.  No significant recent illness or disease state changes.      PT/INR done in office per protocol.  INR is 3.1 which is therapeutic.     Warfarin regimen will be continued at current dose 2.5mg Tues/Fri, 5mg all other days.  Will retest in 6 weeks.    Patient understands dosing directions and information discussed. Dosing schedule and follow up appointment given to patient.   Progress note routed to referring physicians office. Discussed with patient the Pharmacist Collaborative Practice Agreement.  Patient provided verbal and/or electronic (ex. PressConnect) consent to participate in the collaborative practice agreement between the pharmacist and referred patient.     For Pharmacy Admin Tracking Only    Intervention Detail:   Total # of Interventions Recommended: 0  Total # of Interventions Accepted: 0  Time Spent (min): 15

## 2024-05-14 ENCOUNTER — HOSPITAL ENCOUNTER (OUTPATIENT)
Dept: PHARMACY | Age: 64
Setting detail: THERAPIES SERIES
Discharge: HOME | End: 2024-05-14
Payer: COMMERCIAL

## 2024-05-14 DIAGNOSIS — Z95.2 H/O MITRAL VALVE REPLACEMENT WITH MECHANICAL VALVE: Primary | ICD-10-CM

## 2024-05-14 LAB
INR BLD: 2.9
PROTIME: 34.7 SECONDS

## 2024-05-14 PROCEDURE — 99211 OFF/OP EST MAY X REQ PHY/QHP: CPT | Performed by: PHARMACIST

## 2024-05-14 PROCEDURE — 85610 PROTHROMBIN TIME: CPT | Performed by: PHARMACIST

## 2024-05-14 NOTE — PROGRESS NOTES
Patient seen in clinic for warfarin management due to mechanical mitral valve with an INR goal of 2.5-3.5.  Estimated duration of therapy is indefinite.     Patient states compliant all of the time with regimen.  No bleeding or thromboembolic side effects noted.  No significant med or dietary changes.  No significant recent illness or disease state changes.      PT/INR done in office per protocol.  INR is 2.9 which is therapeutic.     Warfarin regimen will be continued at current dose 2.5mg Tues/Fri, 5mg all other days.  Will retest in 6 weeks.    Patient understands dosing directions and information discussed. Dosing schedule and follow up appointment given to patient.   Progress note routed to referring physicians office. Discussed with patient the Pharmacist Collaborative Practice Agreement.  Patient provided verbal and/or electronic (ex. Jack Erwin) consent to participate in the collaborative practice agreement between the pharmacist and referred patient.     For Pharmacy Admin Tracking Only    Intervention Detail:   Total # of Interventions Recommended: 0  Total # of Interventions Accepted: 0  Time Spent (min): 15

## 2024-06-25 ENCOUNTER — HOSPITAL ENCOUNTER (OUTPATIENT)
Dept: PHARMACY | Age: 64
Setting detail: THERAPIES SERIES
Discharge: HOME | End: 2024-06-25
Payer: COMMERCIAL

## 2024-06-25 DIAGNOSIS — Z95.2 H/O MITRAL VALVE REPLACEMENT WITH MECHANICAL VALVE: Primary | ICD-10-CM

## 2024-06-25 LAB
INR BLD: 2.7
PROTIME: 32.6 SECONDS

## 2024-06-25 PROCEDURE — 85610 PROTHROMBIN TIME: CPT

## 2024-06-25 PROCEDURE — 99211 OFF/OP EST MAY X REQ PHY/QHP: CPT

## 2024-06-25 NOTE — PROGRESS NOTES
Patient seen in clinic for warfarin management due to mechanical mitral valve with an INR goal of 2.5-3.5.  Estimated duration of therapy is indefinite.     Patient states compliant all of the time with regimen.  No bleeding or thromboembolic side effects noted.  No significant med or dietary changes.  No significant recent illness or disease state changes.      PT/INR done in office per protocol.  INR is 2.7 which is therapeutic.     Warfarin regimen will be continued at current dose 2.5mg Tuesday/Friday and 5mg all other days.  Will retest in 6 weeks.    Patient understands dosing directions and information discussed. Dosing schedule and follow up appointment given to patient.   Progress note routed to referring physicians office. Discussed with patient the Pharmacist Collaborative Practice Agreement.  Patient provided verbal and/or electronic (ex. Chatterfly) consent to participate in the collaborative practice agreement between the pharmacist and referred patient.     For Pharmacy Admin Tracking Only    Intervention Detail:   Total # of Interventions Recommended: 0  Total # of Interventions Accepted: 0  Time Spent (min): 15

## 2024-07-16 ENCOUNTER — OFFICE VISIT (OUTPATIENT)
Dept: NEUROLOGY | Age: 64
End: 2024-07-16
Payer: COMMERCIAL

## 2024-07-16 VITALS
HEIGHT: 69 IN | DIASTOLIC BLOOD PRESSURE: 100 MMHG | SYSTOLIC BLOOD PRESSURE: 154 MMHG | BODY MASS INDEX: 34.13 KG/M2 | WEIGHT: 230.4 LBS | HEART RATE: 71 BPM

## 2024-07-16 DIAGNOSIS — G31.84 MILD COGNITIVE IMPAIRMENT: Primary | ICD-10-CM

## 2024-07-16 PROCEDURE — 1123F ACP DISCUSS/DSCN MKR DOCD: CPT | Performed by: PSYCHIATRY & NEUROLOGY

## 2024-07-16 PROCEDURE — 99214 OFFICE O/P EST MOD 30 MIN: CPT | Performed by: PSYCHIATRY & NEUROLOGY

## 2024-07-16 NOTE — PROGRESS NOTES
[id number] 04/07/2021      Neurological work up:  CT head  CTA head and neck  MRI brain   2 D echo     All of patient's labs were personally reviewed. All the imaging studies were personally reviewed and discussed with the patient.     Assessment Recommendations    Cerebral amyloid angiopathy:  MRI scan of the brain shows chronic microhemorrhages left more than right cerebellum, left juxtacortical region.  Possible cerebral amyloid angiopathy.  Unfortunately, the patient needs to be on anticoagulation with Coumadin due to mechanical valve.  Risks for anticoagulation including worsening of cerebral hemorrhages were discussed with the patient and questions were answered.  Clinically, the patient appears to be asymptomatic and his MRI scan of the brain has been stable since 2021.  I will obtain a surveillance MRI scan of the brain in next 1 to 2 years.      Regarding the patient's concerns of cognitive decline, I do not believe the have dementia. On today's examination, the patient scored a 29/30 on their MMSE.    Benign essential tremor:  The patient has benign essential positional tremors in both upper extremities.  Beta-blockers are contraindicated as he already is taking sotalol.  Primidone is also relatively corrugated due to patient being on Coumadin.    I discussed that the patient can get weighted wrist braces to help combat their tremors.    Neurological Workup:    MRI Brain without contrast 7/8/2023: Multiple predominately peripherally distributed foci of susceptibility with left frontal subcortical white matter FLAIR signal.  This is suggestive of inflammatory cerebral amyloid angiopathy.  Appearance is similar to 2021.      All medication side effects were discussed and questions answered.  Patient to follow up in 1 year or sooner if symptoms worsen.      Arie Meneses MD I would like to thank you for the consult. Please do not hesitate if you have any questions about the patient care.     Scribe

## 2024-07-17 ENCOUNTER — HOSPITAL ENCOUNTER (OUTPATIENT)
Age: 64
Discharge: HOME | End: 2024-07-17
Payer: COMMERCIAL

## 2024-07-17 DIAGNOSIS — G31.84 MILD COGNITIVE IMPAIRMENT: ICD-10-CM

## 2024-07-17 LAB
FOLATE SERPL-MCNC: 16.6 NG/ML (ref 4.8–24.2)
TSH SERPL DL<=0.05 MIU/L-ACNC: 1.46 UIU/ML (ref 0.27–4.2)
VIT B12 SERPL-MCNC: 1173 PG/ML (ref 232–1245)

## 2024-07-17 PROCEDURE — 82607 VITAMIN B-12: CPT

## 2024-07-17 PROCEDURE — 36415 COLL VENOUS BLD VENIPUNCTURE: CPT

## 2024-07-17 PROCEDURE — 84443 ASSAY THYROID STIM HORMONE: CPT

## 2024-07-17 PROCEDURE — 82746 ASSAY OF FOLIC ACID SERUM: CPT

## 2024-08-07 ENCOUNTER — ANTI-COAG VISIT (OUTPATIENT)
Age: 64
End: 2024-08-07
Payer: COMMERCIAL

## 2024-08-07 DIAGNOSIS — Z95.2 H/O MITRAL VALVE REPLACEMENT WITH MECHANICAL VALVE: Primary | ICD-10-CM

## 2024-08-07 LAB
INR BLD: 3
PROTIME: 35.6 SECONDS

## 2024-08-07 PROCEDURE — 99211 OFF/OP EST MAY X REQ PHY/QHP: CPT

## 2024-08-07 PROCEDURE — 85610 PROTHROMBIN TIME: CPT

## 2024-08-07 NOTE — PROGRESS NOTES
Patient seen in clinic for warfarin management due to mechanical mitral valve with an INR goal of 2.5-3.5.  Estimated duration of therapy is indefinite.     Patient states compliant all of the time with regimen.  No bleeding or thromboembolic side effects noted.  No significant med or dietary changes.  No significant recent illness or disease state changes.      PT/INR done in office per protocol.  INR is 3.0 which is therapeutic.     Warfarin regimen will be continued at current dose of 2.5mg every Tue, Fri; 5mg all other days .  Will retest in 6 weeks.    Patient understands dosing directions and information discussed. Dosing schedule and follow up appointment given to patient.   Progress note routed to referring physicians office. Discussed with patient the Pharmacist Collaborative Practice Agreement.  Patient provided verbal and/or electronic (ex. FaceTags) consent to participate in the collaborative practice agreement between the pharmacist and referred patient.     For Pharmacy Admin Tracking Only    Intervention Detail:   Total # of Interventions Recommended: 0  Total # of Interventions Accepted: 0  Time Spent (min): 15

## 2024-08-12 ENCOUNTER — TELEPHONE (OUTPATIENT)
Age: 64
End: 2024-08-12

## 2024-08-12 RX ORDER — WARFARIN SODIUM 5 MG/1
5 TABLET ORAL DAILY
Qty: 90 TABLET | Refills: 1 | Status: SHIPPED | OUTPATIENT
Start: 2024-08-12

## 2024-08-12 NOTE — TELEPHONE ENCOUNTER
Patient left message requesting warfarin refill.  Refill sent to [facility] on [address] Per patient request.

## 2024-09-16 ENCOUNTER — ANTI-COAG VISIT (OUTPATIENT)
Age: 64
End: 2024-09-16
Payer: COMMERCIAL

## 2024-09-16 DIAGNOSIS — Z95.2 H/O MITRAL VALVE REPLACEMENT WITH MECHANICAL VALVE: Primary | ICD-10-CM

## 2024-09-16 LAB
INTERNATIONAL NORMALIZATION RATIO, POC: 3.2
PROTHROMBIN TIME, POC: 38.6

## 2024-09-16 PROCEDURE — 85610 PROTHROMBIN TIME: CPT | Performed by: PHARMACIST

## 2024-09-16 PROCEDURE — 99211 OFF/OP EST MAY X REQ PHY/QHP: CPT | Performed by: PHARMACIST

## 2024-10-30 ENCOUNTER — ANTI-COAG VISIT (OUTPATIENT)
Age: 64
End: 2024-10-30
Payer: COMMERCIAL

## 2024-10-30 DIAGNOSIS — Z95.2 H/O MITRAL VALVE REPLACEMENT WITH MECHANICAL VALVE: Primary | ICD-10-CM

## 2024-10-30 LAB
INTERNATIONAL NORMALIZATION RATIO, POC: 3
PROTHROMBIN TIME, POC: 35.6

## 2024-10-30 PROCEDURE — 99211 OFF/OP EST MAY X REQ PHY/QHP: CPT

## 2024-10-30 PROCEDURE — 85610 PROTHROMBIN TIME: CPT

## 2024-10-30 NOTE — PROGRESS NOTES
Patient seen in clinic for warfarin management due to mechanical mitral valve with an INR goal of 2.5-3.5.  Estimated duration of therapy is indefinite.     Patient states compliant all of the time with regimen.  No bleeding or thromboembolic side effects noted.  No significant med or dietary changes.  No significant recent illness or disease state changes.      PT/INR done in office per protocol.  INR is 3.0 which is therapeutic.     Warfarin regimen will be continued at current dose 2.5mg every Tue, Fri; 5mg all other days.  Will retest in 6 weeks.    Patient understands dosing directions and information discussed. Dosing schedule and follow up appointment given to patient.   Progress note routed to referring physicians office. Discussed with patient the Pharmacist Collaborative Practice Agreement.  Patient provided verbal and/or electronic (ex. Hiphunters) consent to participate in the collaborative practice agreement between the pharmacist and referred patient.     For Pharmacy Admin Tracking Only    Intervention Detail:   Total # of Interventions Recommended: 0  Total # of Interventions Accepted: 0  Time Spent (min): 15

## 2024-11-06 NOTE — PROGRESS NOTES
Physical Therapy    Facility/Department: 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  Energy source - cryothermia  ALEENA by anesthesia  Date of Service: 10/24/2017    Patient Diagnosis(es): The encounter diagnosis was Acute congestive heart failure, unspecified congestive heart failure type ([facility] [address] has a past medical history of Acute diastolic CHF (congestive heart failure) ([facility] [address] has a past surgical history that includes Mitral valve replacement (2007); transesophageal echocardiogram (10-18-19); Cardiac catheterization (10/19/2017); and Mitral valve replacement (N/A, 10/23/2017). Restrictions  Restrictions/Precautions  Restrictions/Precautions: Cardiac, General Precautions, Fall Risk  Required Braces or Orthoses?: No  Position Activity Restriction  Sternal Precautions: No Pushing, No Pulling, 5# Lifting Restrictions  Other position/activity restrictions: up to chair day of surgery, chest tubes, IV; MV 10/23/17  Vision/Hearing  Vision: Impaired  Vision Exceptions: Wears glasses at all times  Hearing: Within functional limits     Subjective  General  Patient assessed for rehabilitation services?: Yes  Response To Previous Treatment: Not applicable  Family / Caregiver Present: Yes (wife, )  Follows Commands: Within Functional Limits  General Comment  Comments: OT co-eval  Subjective  Subjective: RN and pt agreeable to PT. Pt alert in bed upon arrival.  Pain Screening  Patient Currently in Pain: Yes  Pain Assessment  Pain Assessment: 0-10  Pain Level: 0  Pain Type: Acute pain;Surgical pain  Pain Location: Generalized  Pain Intervention(s): Emotional support; Ambulation/Increased activity  Response to Pain Intervention: Patient Satisfied  Vital Signs  Patient Currently in Pain: Yes  Pre Treatment Pain Screening  Intervention List: Patient able to continue with treatment    Orientation  Orientation  Overall Orientation Status: Within Functional Limits    Social/Functional History  Social/Functional History  Lives With: Spouse  Type of Home: House  Home Layout: Two level, Bed/Bath upstairs  Home Access: Stairs to enter with rails  Entrance Stairs - Number of Steps: 4 LIZZ  Entrance Stairs - Rails: Both  Bathroom Shower/Tub: Tub/Shower unit  Bathroom Toilet: Standard  Bathroom Equipment:  (none, reports can get shower chair)  Home Equipment: Rolling walker, Cane, [address] Help From: Family  ADL Assistance: Independent  Homemaking Assistance: Independent  Homemaking Responsibilities: Yes (wife is also able to assist)  Meal Prep Responsibility: Secondary  Laundry Responsibility: Secondary  Cleaning Responsibility: Secondary  Shopping Responsibility: Secondary  Ambulation Assistance: Independent  Transfer Assistance: Independent  Active : Yes  Mode of Transportation: Family  Additional Comments: 2 dogs  Objective          AROM RLE (degrees)  RLE AROM: WFL  AROM LLE (degrees)  LLE AROM : WFL  AROM RUE (degrees)  RUE AROM : WFL  AROM LUE (degrees)  LUE AROM : WFL  Strength RLE  Strength RLE: WFL  Strength LLE  Strength LLE: WFL  Strength RUE  Strength RUE: WFL  Strength LUE  Strength LUE: WFL     Sensation  Overall Sensation Status: Impaired  Additional Comments: Pt reports some N/T in B hands since surgery   Bed mobility  Supine to Sit: Moderate assistance  Scooting: Moderate assistance  Transfers  Sit to Stand:  Moderate Assistance  Stand to sit: Minimal Assistance (verbal cueing throughout)  Ambulation  Ambulation?: Yes  Ambulation 1  Surface: level tile  Device: Rolling Walker  Assistance: Minimal assistance  Quality of Gait: slowed edward, assist for RW and verbal cues Pt w/ no C/O, VS see flow sheet Frame for Short term goals: 20 visits  Short term goal 1: Pt will be I  with bed mobility  Short term goal 2: Pt will be I with transfers  Short term goal 3: Pt will be I with amb 300' without AD or Brenna with least restrictive AD       Therapy Time   Individual Concurrent Group Co-treatment   Time In 1349         Time Out 1429         Minutes Korin Christensen 76, PT

## 2025-01-08 ENCOUNTER — ANTI-COAG VISIT (OUTPATIENT)
Age: 65
End: 2025-01-08
Payer: COMMERCIAL

## 2025-01-08 DIAGNOSIS — Z95.2 H/O MITRAL VALVE REPLACEMENT WITH MECHANICAL VALVE: Primary | ICD-10-CM

## 2025-01-08 LAB
INTERNATIONAL NORMALIZATION RATIO, POC: 3
PROTHROMBIN TIME, POC: 35.4

## 2025-01-08 PROCEDURE — 85610 PROTHROMBIN TIME: CPT | Performed by: PHARMACIST

## 2025-01-08 PROCEDURE — 99211 OFF/OP EST MAY X REQ PHY/QHP: CPT | Performed by: PHARMACIST

## 2025-01-08 NOTE — PROGRESS NOTES
Patient seen in clinic for warfarin management due to mechanical mitral valve with an INR goal of 2.5-3.5.  Estimated duration of therapy is indefinite.     Patient states compliant all of the time with regimen.  No bleeding or thromboembolic side effects noted.  No significant med or dietary changes.  No significant recent illness or disease state changes.      PT/INR done in office per protocol.  INR is 3 which is therapeutic.     Warfarin regimen will be continued at current dose 2.5mg Tues/Fri, 5mg all other days.  Will retest in 6 weeks.    Patient understands dosing directions and information discussed. Dosing schedule and follow up appointment given to patient.   Progress note routed to referring physicians office. Discussed with patient the Pharmacist Collaborative Practice Agreement.  Patient provided verbal and/or electronic (ex. Equiom) consent to participate in the collaborative practice agreement between the pharmacist and referred patient.     For Pharmacy Admin Tracking Only    Intervention Detail:   Total # of Interventions Recommended: 0  Total # of Interventions Accepted: 0  Time Spent (min): 15

## 2025-01-27 ENCOUNTER — TELEPHONE (OUTPATIENT)
Age: 65
End: 2025-01-27

## 2025-01-27 DIAGNOSIS — Z95.2 H/O MITRAL VALVE REPLACEMENT WITH MECHANICAL VALVE: Primary | ICD-10-CM

## 2025-01-29 ENCOUNTER — TELEPHONE (OUTPATIENT)
Age: 65
End: 2025-01-29

## 2025-01-29 DIAGNOSIS — Z95.2 H/O MITRAL VALVE REPLACEMENT WITH MECHANICAL VALVE: Primary | ICD-10-CM

## 2025-02-04 RX ORDER — ENOXAPARIN SODIUM 100 MG/ML
1 INJECTION SUBCUTANEOUS 2 TIMES DAILY
Qty: 20 ML | Refills: 0 | Status: SHIPPED | OUTPATIENT
Start: 2025-02-06 | End: 2025-02-16

## 2025-02-04 NOTE — TELEPHONE ENCOUNTER
Rx for Lovenox syringes issued to [facility] pharmacy. Will need to go over bridge plan for upcoming procedure at INR appt tomorrow.      For Pharmacy Admin Tracking Only    Intervention Detail: New Rx: 1, reason: Needs Additional Therapy  Total # of Interventions Recommended: 1  Total # of Interventions Accepted: 1  Time Spent (min): 5

## 2025-02-05 ENCOUNTER — ANTI-COAG VISIT (OUTPATIENT)
Age: 65
End: 2025-02-05
Payer: COMMERCIAL

## 2025-02-05 DIAGNOSIS — Z95.2 H/O MITRAL VALVE REPLACEMENT WITH MECHANICAL VALVE: Primary | ICD-10-CM

## 2025-02-05 LAB
INTERNATIONAL NORMALIZATION RATIO, POC: 3.2
PROTHROMBIN TIME, POC: 38.3

## 2025-02-05 PROCEDURE — 99212 OFFICE O/P EST SF 10 MIN: CPT | Performed by: PHARMACIST

## 2025-02-05 PROCEDURE — 85610 PROTHROMBIN TIME: CPT | Performed by: PHARMACIST

## 2025-02-05 NOTE — PROGRESS NOTES
Patient seen in clinic for warfarin management due to mechanical mitral valve with an INR goal of 2.5-3.5.  Estimated duration of therapy is indefinite.     Patient states compliant all of the time with regimen.  No bleeding or thromboembolic side effects noted.  No significant med or dietary changes.  No significant recent illness or disease state changes.      PT/INR done in office per protocol.  INR is 3.2 which is therapeutic.     Warfarin regimen will be continued at current dose for today, then patient will begin 5 day hold and bridge plan for upcoming colonoscopy as shown below.    Will retest on 2/10/25 for preprocedure test.    Patient understands dosing directions and information discussed. Dosing schedule and follow up appointment given to patient.   Progress note routed to referring physicians office. Discussed with patient the Pharmacist Collaborative Practice Agreement.  Patient provided verbal and/or electronic (ex. Friendshipprhart) consent to participate in the collaborative practice agreement between the pharmacist and referred patient.     For Pharmacy Admin Tracking Only    Intervention Detail: Adherence Monitorin and Dose Adjustment: 1, reason: Therapy Optimization  Total # of Interventions Recommended: 2  Total # of Interventions Accepted: 2  Time Spent (min): 15

## 2025-02-10 ENCOUNTER — ANTI-COAG VISIT (OUTPATIENT)
Age: 65
End: 2025-02-10
Payer: COMMERCIAL

## 2025-02-10 DIAGNOSIS — Z95.2 H/O MITRAL VALVE REPLACEMENT WITH MECHANICAL VALVE: Primary | ICD-10-CM

## 2025-02-10 LAB
INTERNATIONAL NORMALIZATION RATIO, POC: 1.3
PROTHROMBIN TIME, POC: 15.5

## 2025-02-10 PROCEDURE — 85610 PROTHROMBIN TIME: CPT | Performed by: PHARMACIST

## 2025-02-10 PROCEDURE — 99212 OFFICE O/P EST SF 10 MIN: CPT | Performed by: PHARMACIST

## 2025-02-10 NOTE — PROGRESS NOTES
Patient seen in clinic for warfarin management due to mechanical mitral valve with an INR goal of 2.5-3.5.  Estimated duration of therapy is indefinite.     Patient states  he has followed pre-procedure plans as instructed .  No bleeding or thromboembolic side effects noted.  No significant med or dietary changes.  No significant recent illness or disease state changes.      PT/INR done in office per protocol.  INR is 1.3 which is expected from holding warfarin.     Warfarin regimen will be resumed after procedure tomorrow as instructed.  Will retest in 1 week after restart.    Patient understands dosing directions and information discussed. Dosing schedule and follow up appointment given to patient.   Progress note routed to referring physicians office. Discussed with patient the Pharmacist Collaborative Practice Agreement.  Patient provided verbal and/or electronic (ex. Kuotust) consent to participate in the collaborative practice agreement between the pharmacist and referred patient.     For Pharmacy Admin Tracking Only    Intervention Detail:   Total # of Interventions Recommended: 0  Total # of Interventions Accepted: 0  Time Spent (min): 15

## 2025-02-13 RX ORDER — WARFARIN SODIUM 5 MG/1
TABLET ORAL
Qty: 90 TABLET | Refills: 0 | Status: SHIPPED | OUTPATIENT
Start: 2025-02-13

## 2025-02-17 ENCOUNTER — ANTI-COAG VISIT (OUTPATIENT)
Age: 65
End: 2025-02-17
Payer: COMMERCIAL

## 2025-02-17 DIAGNOSIS — Z95.2 H/O MITRAL VALVE REPLACEMENT WITH MECHANICAL VALVE: Primary | ICD-10-CM

## 2025-02-17 LAB
INR BLD: 2.2
PROTIME: 26

## 2025-02-17 PROCEDURE — 99212 OFFICE O/P EST SF 10 MIN: CPT | Performed by: PHARMACIST

## 2025-02-17 PROCEDURE — 85610 PROTHROMBIN TIME: CPT

## 2025-02-17 NOTE — PROGRESS NOTES
Patient seen in clinic for warfarin management due to mechanical mitral valve with an INR goal of 2.5-3.5.  Estimated duration of therapy is indefinite.     Patient states he was instructed to hold warfarin the night of his procedure, and so he followed post-operative plan starting on . He states he took his last Lovenox shot yesterday  in the afternoon and did not take a second shot. No bleeding or thromboembolic side effects noted.  No significant med or dietary changes.  No significant recent illness or disease state changes.      PT/INR done in office per protocol.  INR is 2.2 which is subtherapeutic, however is in the appropriate range to discontinue the Lovenox.     Warfarin regimen will be resumed with usual target of 30mg weekly. He will take 2.5mg on Tu/Fri and 5mg daily the rest of the week. He was instructed to stop the Lovenox shots.  Will retest in 1 week.    Patient understands dosing directions and information discussed. Dosing schedule and follow up appointment given to patient.   Progress note routed to referring physicians office. Discussed with patient the Pharmacist Collaborative Practice Agreement.  Patient provided verbal and/or electronic (ex. [employer]) consent to participate in the collaborative practice agreement between the pharmacist and referred patient.     For Pharmacy Admin Tracking Only    Intervention Detail: Adherence Monitorin  Total # of Interventions Recommended: 1  Total # of Interventions Accepted: 1  Time Spent (min): 15

## 2025-02-19 ENCOUNTER — APPOINTMENT (OUTPATIENT)
Age: 65
End: 2025-02-19
Payer: COMMERCIAL

## 2025-02-26 ENCOUNTER — ANTI-COAG VISIT (OUTPATIENT)
Age: 65
End: 2025-02-26
Payer: COMMERCIAL

## 2025-02-26 DIAGNOSIS — Z95.2 H/O MITRAL VALVE REPLACEMENT WITH MECHANICAL VALVE: Primary | ICD-10-CM

## 2025-02-26 LAB
INTERNATIONAL NORMALIZATION RATIO, POC: 2.5
PROTHROMBIN TIME, POC: 29.4

## 2025-02-26 PROCEDURE — 99211 OFF/OP EST MAY X REQ PHY/QHP: CPT

## 2025-02-26 PROCEDURE — 85610 PROTHROMBIN TIME: CPT

## 2025-02-26 RX ORDER — OMEGA-3-ACID ETHYL ESTERS 1 G/1
1 CAPSULE, LIQUID FILLED ORAL DAILY
COMMUNITY

## 2025-02-26 NOTE — PROGRESS NOTES
Patient seen in clinic for warfarin management due to mechanical mitral valve with an INR goal of 2.5-3.5.  Estimated duration of therapy is indefinite.     Patient states compliant all of the time with regimen.  No thromboembolic side effects noted.  No significant med or dietary changes.  No significant recent illness or disease state changes.  Patient states he has extensive bruising and lumps where he was giving himself Lovenox shots. He was concerned as it has been 10 days since his last shot. He does not think that the bruises are getting any worse, so I recommended that he keep an eye on them for now. If he notices then getting worse or becoming red/painful then he should have his doctor examine the area.     PT/INR done in office per protocol.  INR is 2.5 which is therapeutic.     Warfarin regimen will be continued at current dose of 2.5mg Tues/Fri and 5mg all other days.  Will retest in 2 weeks.    Patient understands dosing directions and information discussed. Dosing schedule and follow up appointment given to patient.   Progress note routed to referring physicians office. Discussed with patient the Pharmacist Collaborative Practice Agreement.  Patient provided verbal and/or electronic (ex. [email or web address]) consent to participate in the collaborative practice agreement between the pharmacist and referred patient.     For Pharmacy Admin Tracking Only    Intervention Detail:   Total # of Interventions Recommended: 0  Total # of Interventions Accepted: 0  Time Spent (min): 15

## 2025-03-12 ENCOUNTER — ANTI-COAG VISIT (OUTPATIENT)
Age: 65
End: 2025-03-12
Payer: COMMERCIAL

## 2025-03-12 DIAGNOSIS — Z95.2 H/O MITRAL VALVE REPLACEMENT WITH MECHANICAL VALVE: Primary | ICD-10-CM

## 2025-03-12 LAB
INTERNATIONAL NORMALIZATION RATIO, POC: 2.6
PROTHROMBIN TIME, POC: 31.5

## 2025-03-12 PROCEDURE — 85610 PROTHROMBIN TIME: CPT

## 2025-03-12 PROCEDURE — 99211 OFF/OP EST MAY X REQ PHY/QHP: CPT

## 2025-03-12 NOTE — PROGRESS NOTES
Patient seen in clinic for warfarin management due to mechanical mitral valve with an INR goal of 2.5-3.5.  Estimated duration of therapy is indefinite.     Patient states  missed one dose a week and a half ago, but has otherwise been compliant with regimen .  No bleeding or thromboembolic side effects noted.  No significant med or dietary changes.  No significant recent illness or disease state changes.      PT/INR done in office per protocol.  INR is 2.6 which is therapeutic.     Warfarin regimen will be continued at current dose 2.5mg Tues/Fri and 5mg all other days.  Will retest in 4 weeks.    Patient understands dosing directions and information discussed. Dosing schedule and follow up appointment given to patient.   Progress note routed to referring physicians office. Discussed with patient the Pharmacist Collaborative Practice Agreement.  Patient provided verbal and/or electronic (ex. DeepDyve) consent to participate in the collaborative practice agreement between the pharmacist and referred patient.     For Pharmacy Admin Tracking Only    Intervention Detail:   Total # of Interventions Recommended: 0  Total # of Interventions Accepted: 0  Time Spent (min): 15

## 2025-04-09 ENCOUNTER — ANTI-COAG VISIT (OUTPATIENT)
Age: 65
End: 2025-04-09
Payer: COMMERCIAL

## 2025-04-09 DIAGNOSIS — Z95.2 H/O MITRAL VALVE REPLACEMENT WITH MECHANICAL VALVE: Primary | ICD-10-CM

## 2025-04-09 LAB
INTERNATIONAL NORMALIZATION RATIO, POC: 3
PROTHROMBIN TIME, POC: 36.3

## 2025-04-09 PROCEDURE — 85610 PROTHROMBIN TIME: CPT | Performed by: PHARMACIST

## 2025-04-09 PROCEDURE — 99211 OFF/OP EST MAY X REQ PHY/QHP: CPT | Performed by: PHARMACIST

## 2025-04-09 NOTE — PROGRESS NOTES
Patient seen in clinic for warfarin management due to mechanical mitral valve with an INR goal of 2.5-3.5.  Estimated duration of therapy is indefinite.     Patient states compliant all of the time with regimen.  No bleeding or thromboembolic side effects noted.  No significant med or dietary changes.  No significant recent illness or disease state changes.      PT/INR done in office per protocol.  INR is 3 which is therapeutic.     Warfarin regimen will be continued at current dose 2.5mg Tues/Fri, 5mg all other days.  Will retest in 4 weeks.    Patient understands dosing directions and information discussed. Dosing schedule and follow up appointment given to patient.   Progress note routed to referring physicians office. Discussed with patient the Pharmacist Collaborative Practice Agreement.  Patient provided verbal and/or electronic (ex. [employer]) consent to participate in the collaborative practice agreement between the pharmacist and referred patient.     For Pharmacy Admin Tracking Only    Intervention Detail:   Total # of Interventions Recommended: 0  Total # of Interventions Accepted: 0  Time Spent (min): 15

## 2025-05-07 ENCOUNTER — ANTI-COAG VISIT (OUTPATIENT)
Age: 65
End: 2025-05-07
Payer: COMMERCIAL

## 2025-05-07 DIAGNOSIS — Z95.2 H/O MITRAL VALVE REPLACEMENT WITH MECHANICAL VALVE: Primary | ICD-10-CM

## 2025-05-07 LAB
INTERNATIONAL NORMALIZATION RATIO, POC: 3.5
PROTHROMBIN TIME, POC: 41.9

## 2025-05-07 PROCEDURE — 99211 OFF/OP EST MAY X REQ PHY/QHP: CPT

## 2025-05-07 PROCEDURE — 85610 PROTHROMBIN TIME: CPT

## 2025-05-07 NOTE — PROGRESS NOTES
Patient seen in clinic for warfarin management due to mechanical mitral valve with an INR goal of 2.5-3.5.  Estimated duration of therapy is indefinite.     Patient states compliant all of the time with regimen.  No bleeding or thromboembolic side effects noted.  No significant med or dietary changes.  No significant recent illness or disease state changes.      PT/INR done in office per protocol.  INR is 3.5 which is therapeutic.     Warfarin regimen will be continued at current dose of 2.5mg Tues/Fri and 5mg all other days.  Will retest in 4 weeks.    Patient understands dosing directions and information discussed. Dosing schedule and follow up appointment given to patient.   Progress note routed to referring physicians office. Discussed with patient the Pharmacist Collaborative Practice Agreement.  Patient provided verbal and/or electronic (ex. [employer]) consent to participate in the collaborative practice agreement between the pharmacist and referred patient.     For Pharmacy Admin Tracking Only    Intervention Detail:   Total # of Interventions Recommended: 0  Total # of Interventions Accepted: 0  Time Spent (min): 15

## 2025-05-08 DIAGNOSIS — Z95.2 H/O MITRAL VALVE REPLACEMENT WITH MECHANICAL VALVE: Primary | ICD-10-CM

## 2025-05-12 RX ORDER — WARFARIN SODIUM 5 MG/1
TABLET ORAL
Qty: 90 TABLET | Refills: 2 | Status: SHIPPED | OUTPATIENT
Start: 2025-05-12

## 2025-06-04 ENCOUNTER — ANTI-COAG VISIT (OUTPATIENT)
Age: 65
End: 2025-06-04
Payer: COMMERCIAL

## 2025-06-04 DIAGNOSIS — Z95.2 H/O MITRAL VALVE REPLACEMENT WITH MECHANICAL VALVE: Primary | ICD-10-CM

## 2025-06-04 LAB
INTERNATIONAL NORMALIZATION RATIO, POC: 3
PROTHROMBIN TIME, POC: 0

## 2025-06-04 PROCEDURE — 85610 PROTHROMBIN TIME: CPT

## 2025-06-04 PROCEDURE — 99211 OFF/OP EST MAY X REQ PHY/QHP: CPT

## 2025-06-04 NOTE — PROGRESS NOTES
Patient seen in clinic for warfarin management due to mechanical mitral valve with an INR goal of 2.5-3.5.  Estimated duration of therapy is indefinite.     Patient states compliant all of the time with regimen.  No bleeding or thromboembolic side effects noted.  No significant med or dietary changes.  Patient states his last labs showed low platelet counts and he will be following up with a hematologist.     PT/INR done in office per protocol.  INR is 3.0 which is therapeutic.     Warfarin regimen will be continued at current dose of 2.5mg Tues/Fri and 5mg all other days.  Will retest in 6 weeks.    Patient understands dosing directions and information discussed. Dosing schedule and follow up appointment given to patient.   Progress note routed to referring physicians office. Discussed with patient the Pharmacist Collaborative Practice Agreement.  Patient provided verbal and/or electronic (ex. [email or web address]) consent to participate in the collaborative practice agreement between the pharmacist and referred patient.     For Pharmacy Admin Tracking Only    Intervention Detail:   Total # of Interventions Recommended: 0  Total # of Interventions Accepted: 0  Time Spent (min): 15

## 2025-07-16 ENCOUNTER — ANTI-COAG VISIT (OUTPATIENT)
Age: 65
End: 2025-07-16
Payer: COMMERCIAL

## 2025-07-16 DIAGNOSIS — Z95.2 H/O MITRAL VALVE REPLACEMENT WITH MECHANICAL VALVE: Primary | ICD-10-CM

## 2025-07-16 LAB
INTERNATIONAL NORMALIZATION RATIO, POC: 2.9
PROTHROMBIN TIME, POC: 0

## 2025-07-16 PROCEDURE — 99211 OFF/OP EST MAY X REQ PHY/QHP: CPT

## 2025-07-16 PROCEDURE — 85610 PROTHROMBIN TIME: CPT

## 2025-07-16 NOTE — PROGRESS NOTES
Patient seen in clinic for warfarin management due to mechanical mitral valve with an INR goal of 2.5-3.5.  Estimated duration of therapy is indefinite.     Patient states compliant all of the time with regimen.  No bleeding or thromboembolic side effects noted.  No significant med or dietary changes.  No significant recent illness or disease state changes.      PT/INR done in office per protocol.  INR is 2.9 which is therapeutic.     Warfarin regimen will be continued at current dose of 2.5mg every Tues/Fri and 5mg all other days.  Will retest in 4 weeks.    Patient understands dosing directions and information discussed. Dosing schedule and follow up appointment given to patient.   Progress note routed to referring physicians office. Discussed with patient the Pharmacist Collaborative Practice Agreement.  Patient provided verbal and/or electronic (ex. [employer]) consent to participate in the collaborative practice agreement between the pharmacist and referred patient.     For Pharmacy Admin Tracking Only    Intervention Detail:   Total # of Interventions Recommended: 0  Total # of Interventions Accepted: 0  Time Spent (min): 15

## 2025-07-22 ENCOUNTER — OFFICE VISIT (OUTPATIENT)
Dept: NEUROLOGY | Age: 65
End: 2025-07-22
Payer: COMMERCIAL

## 2025-07-22 VITALS
DIASTOLIC BLOOD PRESSURE: 85 MMHG | BODY MASS INDEX: 34.54 KG/M2 | HEART RATE: 67 BPM | WEIGHT: 233.2 LBS | HEIGHT: 69 IN | SYSTOLIC BLOOD PRESSURE: 139 MMHG

## 2025-07-22 DIAGNOSIS — G31.84 MILD COGNITIVE IMPAIRMENT: Primary | ICD-10-CM

## 2025-07-22 PROCEDURE — 99214 OFFICE O/P EST MOD 30 MIN: CPT | Performed by: PSYCHIATRY & NEUROLOGY

## 2025-07-22 PROCEDURE — 1123F ACP DISCUSS/DSCN MKR DOCD: CPT | Performed by: PSYCHIATRY & NEUROLOGY

## 2025-07-22 NOTE — PROGRESS NOTES
[facility] [place]  [address], [address]  [place] [address]  Ph: [phone] or [phone]  FAX: [phone]      Reason for consult: Altered Mental Status  I had the pleasure of seeing your patient in neurology consultation for his symptoms. As you would recall, Denis Ochoa is a 65-year-old male who presented for evaluation of progressive memory difficulties and upper extremity tremors, with a background of complex cerebrovascular and cardiac history. He had previously been evaluated on May 25, 2024, by ELISSA Jerry, following his last neurology encounter on November 15, 2021, which was prompted by a fall resulting in acute headache and exacerbation of chronic lumbar radiculopathy.  Neuroimaging at the time demonstrated an area of concern for ischemia consistent with a prior infarct. 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 This diagnosis carries clinical significance given his need for chronic anticoagulation following mechanical mitral valve replacement, with the most recent redo sternotomy and valve surgery having been performed on October 23, 2017. His anticoagulation regimen has included chronic warfarin therapy, which continues to present a delicate balance given his underlying cerebral small vessel disease.  During the May 2024 encounter, Mr. Ochoa endorsed chronic mild bilateral lower extremity numbness, beginning in the feet and radiating proximally toward the hips, consistent with a length-dependent pattern. His history of multilevel lumbosacral radiculopathy, most pronounced at L4-L5 and L5-S1, was well documented on MRI lumbar spine in July 2021, which revealed degenerative disc disease with mild central canal and foraminal stenosis. He also reported episodic hypotension, with blood pressure readings as low as 60/30 mmHg, associated with fatigue. This had been evaluated and

## 2025-08-13 ENCOUNTER — ANTI-COAG VISIT (OUTPATIENT)
Age: 65
End: 2025-08-13
Payer: COMMERCIAL

## 2025-08-13 DIAGNOSIS — Z95.2 H/O MITRAL VALVE REPLACEMENT WITH MECHANICAL VALVE: Primary | ICD-10-CM

## 2025-08-13 LAB
INTERNATIONAL NORMALIZATION RATIO, POC: 2.6
PROTHROMBIN TIME, POC: NORMAL

## 2025-08-13 PROCEDURE — 85610 PROTHROMBIN TIME: CPT

## 2025-08-13 PROCEDURE — 99211 OFF/OP EST MAY X REQ PHY/QHP: CPT

## (undated) DEVICE — GLOVE SURG SZ 75 L12IN FNGR THK87MIL DK GRN LTX FREE ISOLEX

## (undated) DEVICE — Z DISCONTINUED USE 2275676 GLOVE SURG SZ 65 L12IN FNGR THK87MIL DK GRN LTX FREE ISOLEX

## (undated) DEVICE — INTENDED FOR TISSUE SEPARATION, AND OTHER PROCEDURES THAT REQUIRE A SHARP SURGICAL BLADE TO PUNCTURE OR CUT.: Brand: BARD-PARKER ® CARBON RIB-BACK BLADES

## (undated) DEVICE — GOWN,AURORA,NONREINFORCED,LARGE: Brand: MEDLINE

## (undated) DEVICE — 35 ML SYRINGE LUER-LOCK TIP: Brand: MONOJECT

## (undated) DEVICE — MEDI-TRACE CADENCE ADULT, DEFIBRILLATION ELECTRODE -RTS  (10 PR/PK) - PHYSIO-CONTROL: Brand: MEDI-TRACE CADENCE

## (undated) DEVICE — MEDI-VAC NON-CONDUCTIVE SUCTION TUBING: Brand: CARDINAL HEALTH

## (undated) DEVICE — GAUZE,SPONGE,4"X4",16PLY,XRAY,STRL,LF: Brand: MEDLINE

## (undated) DEVICE — COR-KNOT MINI® COMBO KITBASE PACKAGE TYPE - KITEACH STERILE PACKAGE KIT CONTAINS (2) SINGLE PATIENT USE COR-KNOT MINI® DEVICES AND (12) COR-KNOT® QUICK LOADS®.: Brand: COR-KNOT MINI®

## (undated) DEVICE — TEMP PACING WIRE: Brand: MYO/WIRE

## (undated) DEVICE — TRAY CATH 16FR COMPLT CARE URIN M TEMP SENS STATLOK STBL

## (undated) DEVICE — GLOVE SURG SZ 65 L12IN FNGR THK87MIL WHT LTX FREE

## (undated) DEVICE — 3M™ IOBAN™ 2 ANTIMICROBIAL INCISE DRAPE 6651EZ: Brand: IOBAN™ 2

## (undated) DEVICE — SUTURE PROL SZ 3-0 L36IN NONABSORBABLE BLU L26MM SH 1/2 CIR 8522H

## (undated) DEVICE — PACK PROCEDURE SURG OPN HRT

## (undated) DEVICE — FOGARTY - HYDRAGRIP SURGICAL - CLAMP INSERTS: Brand: FOGARTY SOFTJAW

## (undated) DEVICE — Z DISCONTINUED APPLICATOR SURG PREP 0.35OZ 2% CHG 70% ISO ALC W/ HI LT

## (undated) DEVICE — DISCONTINUED USE 405792 GLOVE SURG SENSICARE ALOE LT LF PF ST GRN SZ 7

## (undated) DEVICE — NEEDLE SURG FR SPR EYE 1 2 CIR TAPR PNT SIE 2

## (undated) DEVICE — SUTURE NONABSORBABLE BRAIDED 2-0 SH-2 1X30 IN ETHBND EXCEL PXX80

## (undated) DEVICE — CHLORAPREP 26ML ORANGE

## (undated) DEVICE — CATHETER URETH 18FR RED RUB INTMIT ALL PURP 12 PER CA

## (undated) DEVICE — 3M™ STERI-STRIP™ COMPOUND BENZOIN TINCTURE 40 BAGS/CARTON 4 CARTONS/CASE C1544: Brand: 3M™ STERI-STRIP™

## (undated) DEVICE — MEDI-VAC NON-CONDUCTIVE SUCTION TUBING 7MM X 6.1M (20 FT.) L: Brand: CARDINAL HEALTH

## (undated) DEVICE — 1840 FOAM BLOCK NEEDLE COUNTER: Brand: DEVON

## (undated) DEVICE — SUTURE PROL SZ 5-0 L30IN NONABSORBABLE BLU L13MM RB-2 1/2 8710H

## (undated) DEVICE — Z INACTIVE USE 2535480 CLIP LIG M BLU TI HRT SHP WIRE HORZ 180 PER BX

## (undated) DEVICE — SUTURE VCRL + 1 L27IN ABSRB UD CT-1 L36MM 1/2 CIR TAPR PNT VCP261H

## (undated) DEVICE — SUTURE PERMAHAND SZ 2-0 L18IN NONABSORBABLE BLK L26MM SH C012D

## (undated) DEVICE — SUTURE SZ 7 L18IN NONABSORBABLE SIL CCS L48MM 1/2 CIR STRNM M655G

## (undated) DEVICE — SUTURE PERMA-HAND SZ 3-0 L30IN NONABSORBABLE BLK L17MM RB-1 K872H

## (undated) DEVICE — SUTURE NONABSORBABLE MONOFILAMENT 4-0 RB-1 36 IN BLU PROLENE 8557H

## (undated) DEVICE — SUTURE PROL SZ 3-0 L36IN NONABSORBABLE BLU L17MM RB-1 1/2 8558H

## (undated) DEVICE — LUER-LOK 360°: Brand: CONNECTA, LUER-LOK

## (undated) DEVICE — SUTURE VCRL + SZ 3-0 L27IN ABSRB WHT CT-1 1/2 CIR VCP258H

## (undated) DEVICE — PLATELET CONCENTRATION PACK PROC 14-20 ML SMARTPREP 2

## (undated) DEVICE — Device

## (undated) DEVICE — CATHETER THOR L21IN DIA32FR R ANG HYDRAGLIDE SFT RADPQ STRP

## (undated) DEVICE — CANNULA PERFUSION 5.5IN 9FR AORTIC ROOT

## (undated) DEVICE — STRIP,CLOSURE,WOUND,MEDI-STRIP,1/2X4: Brand: MEDLINE

## (undated) DEVICE — TOWEL,OR,DSP,ST,BLUE,DLX,XR,4/PK,20PK/CS: Brand: MEDLINE

## (undated) DEVICE — WIRE TEMP PACE SZ 0 L24IN LIGHT/DARK BLU S STL

## (undated) DEVICE — SUTURE VCRL + SZ 4-0 L18IN ABSRB UD L19MM PS-2 3/8 CIR PRIM VCP496H

## (undated) DEVICE — WAX SURG 2.5GM HEMSTAT BNE BEESWAX PARAFFIN ISO PALMITATE

## (undated) DEVICE — PROTECTOR ULN NRV PUR FOAM HK LOOP STRP ANATOMICALLY

## (undated) DEVICE — CATHETER IV 14GA L2IN POLYUR STR ORNG HUB SFTY RADPQ DISP

## (undated) DEVICE — DRAPE SLUSH DISC W44XL66IN ST FOR RND BSIN HUSH SLUSH SYS

## (undated) DEVICE — COR-KNOT® QUICK LOAD® 6-POUCH: Brand: COR-KNOT® QUICK LOAD®

## (undated) DEVICE — BLADE OPHTH D5MM 15DEG GRN W/ RND KNURLED HNDL MICRO-SHARP

## (undated) DEVICE — BAG ENDOSCP TRNSPRT CLR RECLOSABLE 24INX20IN

## (undated) DEVICE — Z CONVERTED USE 2271043 CONTAINER SPEC COLL 4OZ SCR ON LID PEEL PCH

## (undated) DEVICE — TUBE CHST L20IN OD32FR SIL TAPR CONN TIP STR SFT RADPQ

## (undated) DEVICE — GLOVE SURG SZ 7 L12IN FNGR THK87MIL WHT LTX FREE

## (undated) DEVICE — SET TBNG DISP TIP FOR AHTO

## (undated) DEVICE — SUTURE TICRON SZ 2-0 L30IN NONABSORBABLE BLU CV-331 DBL 8886308751

## (undated) DEVICE — PRESSURE MONITORING LINES 4FT. (122CM) M/F: Brand: PRESSURE MONITORING LINES

## (undated) DEVICE — BLADE SAW W6.35XL32MM STRNM CUT STRNOTMY

## (undated) DEVICE — HEADREST NEURO DONUT 7 IN

## (undated) DEVICE — SPONGE LAP W18XL18IN WHT COT 4 PLY FLD STRUNG RADPQ DISP ST

## (undated) DEVICE — TAPE MED W1/8XL30IN WHT POLY

## (undated) DEVICE — Z DISCONTINUED NO SUB IDED DRAIN SURG 2 COLL PT TB FOR ATS BG OASIS

## (undated) DEVICE — Z INACTIVE USE 2540311 LEAD PACE L475MM CHN A OR V MYOCARDIAL STEROID ELUT SIL